# Patient Record
Sex: FEMALE | Race: WHITE | Employment: UNEMPLOYED | URBAN - METROPOLITAN AREA
[De-identification: names, ages, dates, MRNs, and addresses within clinical notes are randomized per-mention and may not be internally consistent; named-entity substitution may affect disease eponyms.]

---

## 2017-08-15 ENCOUNTER — TELEPHONE (OUTPATIENT)
Dept: FAMILY MEDICINE | Facility: CLINIC | Age: 57
End: 2017-08-15

## 2017-08-15 DIAGNOSIS — M25.512 LEFT SHOULDER PAIN, UNSPECIFIED CHRONICITY: Primary | ICD-10-CM

## 2017-08-15 NOTE — TELEPHONE ENCOUNTER
Reason for Call: Request for an order or referral:    Order or referral being requested: ORDER    Date needed: as soon as possible    Has the patient been seen by the PCP for this problem? 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 Pt has left shoulder pain and would like to see a Physical Therapist.    Phone number Patient can be reached at:  Home number on file [phone] (home)    Best Time:  Any Time    Can we leave a detailed message on this number?  YES    Call taken on 8/15/2017 at 12:02 PM by Laura Otto

## 2017-08-16 NOTE — TELEPHONE ENCOUNTER
Routing to provider - Shelia - please review and advise as appropriate  1. Referral request:  Physical Therapy  2. Reason:  Left shoulder pain due to work  3. Do you recommend any evaluation regarding workman's comp?  4. Last office visit 10/18/2016      Thank you,  Josselyn Castañeda RN

## 2017-08-16 NOTE — TELEPHONE ENCOUNTER
Return call to patient left referral information and home care recommendations - ice/heat, rest, tylenol/ibuprofen    To return call if symptoms worsen or physical therapy ineffective    Josselyn Castañeda RN

## 2017-08-22 ENCOUNTER — THERAPY VISIT (OUTPATIENT)
Dept: PHYSICAL THERAPY | Facility: CLINIC | Age: 57
End: 2017-08-22
Payer: COMMERCIAL

## 2017-08-22 DIAGNOSIS — M25.512 LEFT SHOULDER PAIN: Primary | ICD-10-CM

## 2017-08-22 PROCEDURE — 97110 THERAPEUTIC EXERCISES: CPT | Mod: GP | Performed by: PHYSICAL THERAPIST

## 2017-08-22 PROCEDURE — 97161 PT EVAL LOW COMPLEX 20 MIN: CPT | Mod: GP | Performed by: PHYSICAL THERAPIST

## 2017-08-22 NOTE — MR AVS SNAPSHOT
"              After Visit Summary   2017    Ruthann Beck    MRN: [id number]           Patient Information     Date Of Birth          1960        Visit Information        Provider Department      2017 4:50 PM Keyur Abarca PT [facility] [email or web address] [facility]        Today's Diagnoses     Left shoulder pain    -  1       Follow-ups after your visit        Who to contact     If you have questions or need follow up information about today's clinic visit or your schedule please contact [facility] directly at [phone].  Normal or non-critical lab and imaging results will be communicated to you by Cyprotexhart, letter or phone within 4 business days after the clinic has received the results. If you do not hear from us within 7 days, please contact the clinic through SocialTaggt or phone. If you have a critical or abnormal lab result, we will notify you by phone as soon as possible.  Submit refill requests through Hall or call your pharmacy and they will forward the refill request to us. Please allow 3 business days for your refill to be completed.          Additional Information About Your Visit        MyChart Information     Hall lets you send messages to your doctor, view your test results, renew your prescriptions, schedule appointments and more. To sign up, go to [email or web address] . Click on \"Log in\" on the left side of the screen, which will take you to the Welcome page. Then click on \"Sign up Now\" on the right side of the page.     You will be asked to enter the access code listed below, as well as some personal information. Please follow the directions to create your username and password.     Your access code is: [email or web address]  Expires: 2017  1:42 PM     Your access code will  in 90 days. If you need help or a new code, please call your [place] clinic or [phone].        Care EveryWhere ID     This is your Care EveryWhere " ID. This could be used by other organizations to access your [facility] medical records  [id number]        Your Vitals Were     Last Period                   04/05/2010            Blood Pressure from Last 3 Encounters:   10/18/16 120/70   09/06/16 90/58   08/17/16 98/60    Weight from Last 3 Encounters:   10/18/16 66.7 kg (147 lb)   09/06/16 67.1 kg (148 lb)   08/17/16 67.6 kg (149 lb)              We Performed the Following     HC PT EVAL, LOW COMPLEXITY     KIN INITIAL EVAL REPORT     THERAPEUTIC EXERCISES        Primary Care Provider Office Phone # Fax #    Deqa Amparo Bass [phone] [address] [facility] [address]        Equal Access to Services     AURE CABA : Hadii marizol álvarezo [place], waaxda luqadaha, qaybta kaalmada adeegyada, enzo espinoza . So [facility] [phone].    ATENCIÓN: Si habla español, tiene a aguilera disposición servicios gratuitos de asistencia lingüística. [facility] [phone].    We comply with applicable federal civil rights laws and [place] laws. We do not discriminate on the basis of race, color, national origin, age, disability sex, sexual orientation or gender identity.            Thank you!     Thank you for choosing [facility]  for your care. Our goal is always to provide you with excellent care. Hearing back from our patients is one way we can continue to improve our services. Please take a few minutes to complete the written survey that you may receive in the mail after your visit with us. Thank you!             Your Updated Medication List - Protect others around you: Learn how to safely use, store and throw away your medicines at www.disposemymeds.org.          This list is accurate as of: 8/22/17 11:59 PM.  Always use your most recent med list.                   Brand Name Dispense Instructions for use Diagnosis    estradiol 0.05 MG/24HR BIW patch    VIVELLE-DOT     Place 1 patch onto  the skin twice a week        PROMETRIUM PO      Take  by mouth daily. 1 tablet daily

## 2017-08-22 NOTE — PROGRESS NOTES
[facility] Initial Evaluation  Physical Therapy Initial Examination/Evaluation  August 22, 2017    Ruthann Beck is a 57 year old  female referred to physical therapy by Dr. Bass for treatment of left shoulder pain with Precautions/Restrictions/MD instructions eval and treat    Subjective:  Referring MD visit date: 8/17/17  DOI/onset: July 2017  Mechanism of injury: Repetitive overuse injury from work. 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  DOS N/A  Previous treatment: None  Imaging: None  Chief Complaint:   Left shoulder pain that increases with various activities including lifting/reaching over head, carrying and sleeping  Pain: rest 2 /10, activity 6/10 with overhead lifting Described as: ache sometimes sharp Alleviated by: rest, inactivity Frequency: intermittent Progression of symptoms since initial onset: better Time of day when pain is worse: day  Sleeping: Will wake on occasion, especially when sleeping on left side, with pain    Occupation: [employer]  Job duties:  Prolonged standing, lifting/carrying, pushing/pulling, repetitive tasks    Current HEP/exercise regimen: Progressive rotator cuff strengthening  Patient's goals are Decrease left shoulder pain, return to prior level of function without pain, perform work related activities without pain    Pertinent PMH: Menopausal   General Health Reported by Patient: excellent   Return to MD:  6 weeks if no improvement      SHOULDER EVALUATION  Static Posture:  Forward head: None Rounded shoulders: None Scapular winging: None    Dynamic scapular assessment: Normal scapular mechanics bilaterally in abduction plane  Flip Sign: Negative     Range of Motion:    AROM Flexion Abduction Flex/ER Ext/IR   Left 170 170 T2 T7   Right 180 180 T2 T6     PROM Flex Abd ER @ 90 IR @ 90   Left 180 180 80 90   Right 180 180 80 90     Strength:  MMT Flex Scaption Abd ER @ 0 IR @ 0 Mid trap Lower  trap   Left 4+ 4+ 4 4- 4+     Right 5 5 5 5 5       Special Tests  Left Right   Empty Can - -   Drop arm  - -   Yergason's  - -   Speed's - -   Patino-Mahamed - -   Neer - -   Lift-off - -   Apprehension - -   Hamm's + -   Sulcus Sign - -   AC cross body - -                                              Palpation:  Left: Mild tenderness along insertion of infraspinatus  Right: Unremarkable           HPI                    Objective:    System    Physical Exam    General     ROS    Assessment/Plan:      Patient is a 57 year old female with left side shoulder complaints.    Patient has the following significant findings with corresponding treatment plan.                Diagnosis 1:  Left shoulder pain  Pain -  manual therapy, splint/taping/bracing/orthotics, self management, education and home program  Decreased ROM/flexibility - manual therapy, therapeutic exercise, therapeutic activity and home program  Decreased strength - therapeutic exercise, therapeutic activities and home program  Decreased proprioception - neuro re-education, therapeutic activities and home program  Impaired muscle performance - neuro re-education and home program    Therapy Evaluation Codes:   1) History comprised of:   Personal factors that impact the plan of care:      None.    Comorbidity factors that impact the plan of care are:      Menopausal.     Medications impacting care: Hormone replacement.  2) Examination of Body Systems comprised of:   Body structures and functions that impact the plan of care:      Shoulder.   Activity limitations that impact the plan of care are:      Lifting and Reaching and Carrying.  3) Clinical presentation characteristics are:   Stable/Uncomplicated.  4) Decision-Making    Low complexity using standardized patient assessment instrument and/or measureable assessment of functional outcome.  Cumulative Therapy Evaluation is: Low complexity.    Previous and current functional limitations:  (See Goal Flow  Sheet for this information)    Short term and Long term goals: (See Goal Flow Sheet for this information)     Communication ability:  Patient appears to be able to clearly communicate and understand verbal and written communication and follow directions correctly.  Treatment Explanation - The following has been discussed with the patient:   RX ordered/plan of care  Anticipated outcomes  Possible risks and side effects  This patient would benefit from PT intervention to resume normal activities.   Rehab potential is good.    Frequency:  1 X week, once daily  Duration:  for 4 weeks  Discharge Plan:  Achieve all LTG.  Independent in home treatment program.  Reach maximal therapeutic benefit.    Please refer to the daily flowsheet for treatment today, total treatment time and time spent performing 1:1 timed codes.

## 2017-08-24 NOTE — PROGRESS NOTES
Subjective:    Patient is a 57 year old female presenting with rehab left ankle/foot hpi.                    and reported as 2/10.                General health as reported by patient is excellent.  Pertinent medical history includes:  Menopausal.    Surgical history: cryosurgery cervix, ankle.  Current medications:  Hormone replacement therapy.  Current occupation is [employer].    Primary job tasks include:  Prolonged standing, lifting and repetitive tasks.                                Objective:    System    Physical Exam    General     ROS    Assessment/Plan:

## 2017-09-20 ENCOUNTER — TELEPHONE (OUTPATIENT)
Dept: FAMILY MEDICINE | Facility: CLINIC | Age: 57
End: 2017-09-20

## 2017-11-15 ENCOUNTER — OFFICE VISIT (OUTPATIENT)
Dept: FAMILY MEDICINE | Facility: CLINIC | Age: 57
End: 2017-11-15
Payer: OTHER MISCELLANEOUS

## 2017-11-15 VITALS
SYSTOLIC BLOOD PRESSURE: 116 MMHG | BODY MASS INDEX: 25 KG/M2 | WEIGHT: 150.25 LBS | HEART RATE: 85 BPM | TEMPERATURE: 97.9 F | DIASTOLIC BLOOD PRESSURE: 74 MMHG | RESPIRATION RATE: 18 BRPM | OXYGEN SATURATION: 98 %

## 2017-11-15 DIAGNOSIS — S61.412A LACERATION OF LEFT HAND WITHOUT FOREIGN BODY, INITIAL ENCOUNTER: Primary | ICD-10-CM

## 2017-11-15 PROCEDURE — 99213 OFFICE O/P EST LOW 20 MIN: CPT | Performed by: FAMILY MEDICINE

## 2017-11-15 PROCEDURE — G0168 WOUND CLOSURE BY ADHESIVE: HCPCS | Performed by: FAMILY MEDICINE

## 2017-11-15 NOTE — PATIENT INSTRUCTIONS
General recommendations for sleep problems (Insomnia)  Allow 2-4 weeks to see results     Establish a regular sleep schedule   Go to bed at same time each night, get up at same time each day, avoid sleeping-in on weekends.  Cut down time in bed (if not asleep, get up, go in other room, do something calming and boring such as sorting papers, making warm milk, knitting, dusting)   Avoid trying to force yourself to sleep.  Use your bed only for sleep. Do not read or watch Television in bed.     Make the bedroom comfortable  Keepthe temperature in your bedroom comfortable, keep bedroom quiet when sleeping, and consider ear plugs (silicon) especially if you have partner who snores.  Keep bedroom dark enough:  use dark blinds or wear an eye mask if needed.    Relax at bedtime.    Do not watch tv or play video games or surf on computer right before bed; the full spectrum light actually stimulates your brain!  Relax each muscle group individually ; begin with your feet, work toward your head. Deal with your worries before bedtime by setting aside a worry time for 30 minutes earlier or journal your thoughts.  Listen to relaxation tapes such as Classical Music or Nature sounds or imagine a tranquil scene (e.g. waterfall or beach)   Back Massage : Gentle 5-minute back rub prior to bedtime can help relax you.    Perform measures to make you tired at bedtime.   Get regular Exercise each day (at least 6 hours before bedtime)   Take medications only as directed   Eat a light bedtime snack or warm drink, such as milk or herbal tea (non-caffeinated)   No Napping during day     Stimulus Control   Do not lie awake for more than 30 minutes.   Get out of bed and perform a quiet activity, then return to bed when sleepy.  Repeat as many times per night as needed     Things to avoid   Do not Exercise just before bedtime   No overstimulating activities just before bed, such as competitive games or exciting Television programs  Avoid  caffeine (coffee, tea, soda), chocolate after lunchtime   Do not use alcohol to induce sleep (worsens Insomnia)   Do not take someone else's sleeping pills   Do not look at the clock when awakening   Do not turn on light when getting up to use bathroom     Read the book Say Good Night To Insomnia by Rob.    I recommend taking melatonin at night to help with insomnia. This is a natural sleep inducer that shouldn't leave you feeling sedated or too tired in the morning.  Many people use it to help prevent jet lag when traveling.      It is available over the counter in drug stores and grocery stores.  Please follow the instruction on the bottle.  Dosages vary from 0.3 mg to 3 mg, and even very low doses appear to be effective.  Take it about 30 minutes before you lie down at night.

## 2017-11-15 NOTE — NURSING NOTE
"Chief Complaint   Patient presents with     Work Comp     cut with knife       Initial /74 (BP Location: Right arm, Patient Position: Sitting, Cuff Size: Adult Large)  Pulse 85  Temp 97.9  F (36.6  C) (Oral)  Resp 18  Wt 150 lb 4 oz (68.2 kg)  LMP 04/05/2010  SpO2 98%  BMI 25 kg/m2 Estimated body mass index is 25 kg/(m^2) as calculated from the following:    Height as of 7/13/16: 5' 5\" (1.651 m).    Weight as of this encounter: 150 lb 4 oz (68.2 kg).  Medication Reconciliation: complete     Richa Cruz, DOUGLAS        "

## 2017-11-15 NOTE — MR AVS SNAPSHOT
After Visit Summary   11/15/2017    Ruthann Beck    MRN: [id number]           Patient Information     Date Of Birth          1960        Visit Information        Provider Department      11/15/2017 3:00 PM Danya Almonte MD [facility]        Today's Diagnoses     Laceration of left hand without foreign body, initial encounter    -  1      Care Instructions    General recommendations for sleep problems (Insomnia)  Allow 2-4 weeks to see results     Establish a regular sleep schedule   Go to bed at same time each night, get up at same time each day, avoid sleeping-in on weekends.  Cut down time in bed (if not asleep, get up, go in other room, do something calming and boring such as sorting papers, making warm milk, knitting, dusting)   Avoid trying to force yourself to sleep.  Use your bed only for sleep. Do not read or watch Television in bed.     Make the bedroom comfortable  Keepthe temperature in your bedroom comfortable, keep bedroom quiet when sleeping, and consider ear plugs (silicon) especially if you have partner who snores.  Keep bedroom dark enough:  use dark blinds or wear an eye mask if needed.    Relax at bedtime.    Do not watch tv or play video games or surf on computer right before bed; the full spectrum light actually stimulates your brain!  Relax each muscle group individually ; begin with your feet, work toward your head. Deal with your worries before bedtime by setting aside a worry time for 30 minutes earlier or journal your thoughts.  Listen to relaxation tapes such as Classical Music or Nature sounds or imagine a tranquil scene (e.g. waterfall or beach)   Back Massage : Gentle 5-minute back rub prior to bedtime can help relax you.    Perform measures to make you tired at bedtime.   Get regular Exercise each day (at least 6 hours before bedtime)   Take medications only as directed   Eat a light bedtime snack or warm drink, such as milk or herbal  tea (non-caffeinated)   No Napping during day     Stimulus Control   Do not lie awake for more than 30 minutes.   Get out of bed and perform a quiet activity, then return to bed when sleepy.  Repeat as many times per night as needed     Things to avoid   Do not Exercise just before bedtime   No overstimulating activities just before bed, such as competitive games or exciting Television programs  Avoid caffeine (coffee, tea, soda), chocolate after lunchtime   Do not use alcohol to induce sleep (worsens Insomnia)   Do not take someone else's sleeping pills   Do not look at the clock when awakening   Do not turn on light when getting up to use bathroom     Read the book Say Good Night To Insomnia by Rob.    I recommend taking melatonin at night to help with insomnia. This is a natural sleep inducer that shouldn't leave you feeling sedated or too tired in the morning.  Many people use it to help prevent jet lag when traveling.      It is available over the counter in drug stores and grocery stores.  Please follow the instruction on the bottle.  Dosages vary from 0.3 mg to 3 mg, and even very low doses appear to be effective.  Take it about 30 minutes before you lie down at night.             Follow-ups after your visit        Who to contact     If you have questions or need follow up information about today's clinic visit or your schedule please contact [facility] directly at [phone].  Normal or non-critical lab and imaging results will be communicated to you by MyChart, letter or phone within 4 business days after the clinic has received the results. If you do not hear from us within 7 days, please contact the clinic through MyChart or phone. If you have a critical or abnormal lab result, we will notify you by phone as soon as possible.  Submit refill requests through [employer] or call your pharmacy and they will forward the refill request to us. Please allow 3 business days for your  "refill to be completed.          Additional Information About Your Visit        QuinturaharBucky Box Information     [employer] lets you send messages to your doctor, view your test results, renew your prescriptions, schedule appointments and more. To sign up, go to [email or web address] . Click on \"Log in\" on the left side of the screen, which will take you to the Welcome page. Then click on \"Sign up Now\" on the right side of the page.     You will be asked to enter the access code listed below, as well as some personal information. Please follow the directions to create your username and password.     Your access code is: [email or web address]  Expires: 2017 12:42 PM     Your access code will  in 90 days. If you need help or a new code, please call your [place] clinic or [phone].        Care EveryWhere ID     This is your [facility] EveryWhere ID. This could be used by other organizations to access your [facility] medical records  [id number]        Your Vitals Were     Pulse Temperature Respirations Last Period Pulse Oximetry BMI (Body Mass Index)    85 97.9  F (36.6  C) (Oral) 18 2010 98% 25 kg/m2       Blood Pressure from Last 3 Encounters:   11/15/17 116/74   10/18/16 120/70   16 90/58    Weight from Last 3 Encounters:   11/15/17 150 lb 4 oz (68.2 kg)   10/18/16 147 lb (66.7 kg)   16 148 lb (67.1 kg)              Today, you had the following     No orders found for display       Primary Care Provider Office Phone # Fax #    Deqa Amparo Bass [phone] [address]  [facility] [address]        Equal Access to Services     AUNG CABA : Jaren Flores, justus muñoz, lena finn, enzo garcia. So [facility] [phone].    ATENCIÓN: Si habla español, tiene a aguilera disposición servicios gratuitos de asistencia lingüística. Llame al [phone].    We comply with applicable federal civil rights laws and [place] laws. We do " not discriminate on the basis of race, color, national origin, age, disability, sex, sexual orientation, or gender identity.            Thank you!     Thank you for choosing [facility]  for your care. Our goal is always to provide you with excellent care. Hearing back from our patients is one way we can continue to improve our services. Please take a few minutes to complete the written survey that you may receive in the mail after your visit with us. Thank you!             Your Updated Medication List - Protect others around you: Learn how to safely use, store and throw away your medicines at www.disposemymeds.org.          This list is accurate as of: 11/15/17  4:02 PM.  Always use your most recent med list.                   Brand Name Dispense Instructions for use Diagnosis    estradiol 0.05 MG/24HR BIW patch    VIVELLE-DOT     Place 1 patch onto the skin twice a week        PROMETRIUM PO      Take  by mouth daily. 1 tablet daily

## 2017-11-15 NOTE — PROGRESS NOTES
"  SUBJECTIVE:   Ruthann Beck is a 57 year old female who presents to clinic today for the following health issues:      Work comp injury - patient cut herself with a knife, left hand, thumb at 7:30 am this morning.  Works as a  and was using a knife to get cold ganache out of a pan.   Slipped and cut left thenar eminence.  She is RHD.  States the wound \"bled a lot\"  She cleaned it and covered it and it stopped bleeding.  Wondering about status of tetanus.        Problem list and histories reviewed & adjusted, as indicated.  Additional history: as documented    Immunization History   Administered Date(s) Administered     Influenza (IIV3) 11/08/2005, 10/24/2011     Influenza Vaccine IM 3yrs+ 4 Valent IIV4 11/05/2013, 10/30/2015, 09/06/2016     TDAP Vaccine (Adacel) 02/01/2008, 02/07/2013       BP Readings from Last 3 Encounters:   11/15/17 116/74   10/18/16 120/70   09/06/16 90/58    Wt Readings from Last 3 Encounters:   11/15/17 150 lb 4 oz (68.2 kg)   10/18/16 147 lb (66.7 kg)   09/06/16 148 lb (67.1 kg)             Reviewed and updated as needed this visit by clinical staff  Tobacco  Allergies  Meds  Med Hx  Surg Hx  Fam Hx  Soc Hx            OBJECTIVE:     /74 (BP Location: Right arm, Patient Position: Sitting, Cuff Size: Adult Large)  Pulse 85  Temp 97.9  F (36.6  C) (Oral)  Resp 18  Wt 150 lb 4 oz (68.2 kg)  LMP 04/05/2010  SpO2 98%  BMI 25 kg/m2  Body mass index is 25 kg/(m^2).  GEN:  no apparent distress   Wound Findings:  Body area: left thenar eminence  Laceration length: 1cm  Contamination: The wound is not contaminated.  Foreign bodies:none  Tendon involvement: none   Not bleeding at all at this point    PROCEDURE:  Discussed laceration repair and obtained verbal consent.  The wound was cleansed with hibiclens and was closed with Dermabond. The patient tolerated the procedure well.  Wound cares were discussed including avoidance of petroleum jelly and other ointments  " Discussed signs of infection.      ASSESSMENT/PLAN:       1. Laceration of left hand without foreign body, initial encounter  I recommended dermabond given her profession and the relatively short, superficial nature of the wound and the location on a part of the thenar eminence that should not be under a lot of tension.  Discussed wound cares.    - WOUND CLOSURE BY ADHESIVE       Danya Almonte MD  [facility]

## 2018-01-09 ENCOUNTER — OFFICE VISIT (OUTPATIENT)
Dept: FAMILY MEDICINE | Facility: CLINIC | Age: 58
End: 2018-01-09
Payer: COMMERCIAL

## 2018-01-09 VITALS
BODY MASS INDEX: 25.79 KG/M2 | WEIGHT: 155 LBS | SYSTOLIC BLOOD PRESSURE: 126 MMHG | HEART RATE: 76 BPM | TEMPERATURE: 97.8 F | OXYGEN SATURATION: 99 % | RESPIRATION RATE: 16 BRPM | DIASTOLIC BLOOD PRESSURE: 78 MMHG

## 2018-01-09 DIAGNOSIS — D23.5 BENIGN NEOPLASM OF SKIN OF TRUNK, EXCEPT SCROTUM: Primary | ICD-10-CM

## 2018-01-09 PROCEDURE — 99213 OFFICE O/P EST LOW 20 MIN: CPT | Performed by: FAMILY MEDICINE

## 2018-01-09 NOTE — PROGRESS NOTES
SUBJECTIVE:       Pt has had skin lesion on the face for at least one year. It hurt when it was white and had a layer of white. She applied hand cream which improved. No change in size. Pt doesn't normally wear sun screen. No history of skin cancer. No family history of skin cancer.     Problem list and histories reviewed & adjusted, as indicated.  Additional history: as documented    Reviewed and updated as needed this visit by clinical staffTobacco  Allergies  Meds  Med Hx  Surg Hx  Fam Hx  Soc Hx      Reviewed and updated as needed this visit by Provider         ROS:  Constitutional, HEENT, cardiovascular, pulmonary, gi and gu systems are negative, except as otherwise noted.      OBJECTIVE:   /78  Pulse 76  Temp 97.8  F (36.6  C) (Tympanic)  Resp 16  Wt 155 lb (70.3 kg)  LMP 04/05/2010  SpO2 99%  BMI 25.79 kg/m2  Body mass index is 25.79 kg/(m^2).  GENERAL: healthy, alert and no distress  EYES: Eyes grossly normal to inspection  HENT:  nose and mouth without ulcers or lesions  SKIN: skin colored papule on left infraorbital region     Diagnostic Test Results:  none     ASSESSMENT/PLAN:     1. Benign neoplasm of skin of trunk, except scrotum  - DERMATOLOGY REFERRAL for further evaluation   - advised to wear sun screen         Glory Bass MD  [facility]

## 2018-01-09 NOTE — MR AVS SNAPSHOT
After Visit Summary   1/9/2018    Ruthann Beck    MRN: [id number]           Patient Information     Date Of Birth          1960        Visit Information        Provider Department      1/9/2018 10:00 AM Glory Bass MD [facility]        Today's Diagnoses     Benign neoplasm of skin of trunk, except scrotum    -  1       Follow-ups after your visit        Additional Services     DERMATOLOGY REFERRAL       Your provider has referred you to: [facility]: Dermatology Clinic [facility] [phone]   [email or web address]    Please be aware that coverage of these services is subject to the terms and limitations of your health insurance plan.  Call member services at your health plan with any benefit or coverage questions.      Please bring the following with you to your appointment:    (1) Any X-Rays, CTs or MRIs which have been performed.  Contact the facility where they were done to arrange for  prior to your scheduled appointment.    (2) List of current medications  (3) This referral request   (4) Any documents/labs given to you for this referral                  Who to contact     If you have questions or need follow up information about today's clinic visit or your schedule please contact [facility] directly at [phone].  Normal or non-critical lab and imaging results will be communicated to you by MyChart, letter or phone within 4 business days after the clinic has received the results. If you do not hear from us within 7 days, please contact the clinic through MyChart or phone. If you have a critical or abnormal lab result, we will notify you by phone as soon as possible.  Submit refill requests through [employer] or call your pharmacy and they will forward the refill request to us. Please allow 3 business days for your refill to be completed.          Additional Information About Your Visit        MyChart  "Information     SiOx lets you send messages to your doctor, view your test results, renew your prescriptions, schedule appointments and more. To sign up, go to [email or web address] . Click on \"Log in\" on the left side of the screen, which will take you to the Welcome page. Then click on \"Sign up Now\" on the right side of the page.     You will be asked to enter the access code listed below, as well as some personal information. Please follow the directions to create your username and password.     Your access code is: [email or web address]  Expires: 2018 10:24 AM     Your access code will  in 90 days. If you need help or a new code, please call your [place] clinic or [phone].        Care EveryWhere ID     This is your [facility] EveryWhere ID. This could be used by other organizations to access your [place] medical records  [id number]        Your Vitals Were     Pulse Temperature Respirations Last Period Pulse Oximetry BMI (Body Mass Index)    76 97.8  F (36.6  C) (Tympanic) 16 2010 99% 25.79 kg/m2       Blood Pressure from Last 3 Encounters:   18 126/78   11/15/17 116/74   10/18/16 120/70    Weight from Last 3 Encounters:   18 155 lb (70.3 kg)   11/15/17 150 lb 4 oz (68.2 kg)   10/18/16 147 lb (66.7 kg)              We Performed the Following     DERMATOLOGY REFERRAL        Primary Care Provider Office Phone # Fax #    Deqa Amparo Bass [phone]       [address]  [facility] [address]        Equal Access to Services     [facility]: Hadii marizol Flores, justus muñoz, enzo landeros . So [facility] [phone].    ATENCIÓN: Si habla español, tiene a aguilera disposición servicios gratuitos de asistencia lingüística. Llame al [phone].    We comply with applicable federal civil rights laws and [place] laws. We do not discriminate on the basis of race, color, national origin, age, disability, sex, " sexual orientation, or gender identity.            Thank you!     Thank you for choosing [facility]  for your care. Our goal is always to provide you with excellent care. Hearing back from our patients is one way we can continue to improve our services. Please take a few minutes to complete the written survey that you may receive in the mail after your visit with us. Thank you!             Your Updated Medication List - Protect others around you: Learn how to safely use, store and throw away your medicines at www.disposemymeds.org.          This list is accurate as of: 1/9/18 10:24 AM.  Always use your most recent med list.                   Brand Name Dispense Instructions for use Diagnosis    estradiol 0.05 MG/24HR BIW patch    VIVELLE-DOT     Place 1 patch onto the skin twice a week        PROMETRIUM PO      Take  by mouth daily. 1 tablet daily

## 2018-01-19 ENCOUNTER — TELEPHONE (OUTPATIENT)
Dept: FAMILY MEDICINE | Facility: CLINIC | Age: 58
End: 2018-01-19

## 2018-01-19 NOTE — TELEPHONE ENCOUNTER
Reason for Call:  Other call back    Detailed comments: Pt would like Dr Bass to call [facility]: [facility] [place] [phone] to see if pt can get appointment sooner than the end of April 2018. Pt was diagnosed with Actinic Keratosis.     Phone Number Patient can be reached at: Home number on file [phone] (home)    Best Time: Any Time    Can we leave a detailed message on this number? YES    Call taken on 1/19/2018 at 11:24 AM by Laura Otto

## 2018-01-22 NOTE — TELEPHONE ENCOUNTER
Your provider has referred you to: [facility]: [facility] Dermatology [facility] [phone]   [email or web address]  [facility]: [facility] Dermatology [facility] [phone]  [facility]: [facility] [phone]  [email or web address]  [facility]: [facility] [phone]   [email or web address]  [facility]: [facility] [phone]   [email or web address]        Above are other derm referrals. Does pt only want to go to [facility] derm?  DM

## 2018-01-22 NOTE — TELEPHONE ENCOUNTER
,  --She would like to know how urgent this skin problem is.  --She did take the alternative phone numbers for other derm clinics.    Kimberly REMY

## 2018-01-23 NOTE — TELEPHONE ENCOUNTER
PCP huddled with me.  She would want pt seen in the next few weeks.  [place] is known to get pt's seen within 4 weeks. That might be a good option.      LM for pt to call and speak with triage.  SANDRITA Eagle

## 2018-01-25 NOTE — TELEPHONE ENCOUNTER
Detailed message left for patient relaying recommendation per Dr. Bass and reviewing additional dermatology clinic options.    TOSHA OliveiraN, RN

## 2018-05-26 ENCOUNTER — HEALTH MAINTENANCE LETTER (OUTPATIENT)
Age: 58
End: 2018-05-26

## 2018-06-23 LAB
HPV ABSTRACT: NORMAL
PAP-ABSTRACT: NORMAL

## 2018-07-14 ENCOUNTER — TRANSFERRED RECORDS (OUTPATIENT)
Dept: HEALTH INFORMATION MANAGEMENT | Facility: CLINIC | Age: 58
End: 2018-07-14

## 2018-07-14 LAB
CHOLEST SERPL-MCNC: 228 MG/DL (ref 0–199)
HDLC SERPL-MCNC: 80 MG/DL
LDLC SERPL CALC-MCNC: 135 MG/DL (ref 19–130)
NONHDLC SERPL-MCNC: 148 MG/DL (ref 0–159)
TRIGL SERPL-MCNC: 63 MG/DL (ref 4–149)

## 2018-07-23 ENCOUNTER — OFFICE VISIT (OUTPATIENT)
Dept: FAMILY MEDICINE | Facility: CLINIC | Age: 58
End: 2018-07-23
Payer: COMMERCIAL

## 2018-07-23 VITALS
RESPIRATION RATE: 12 BRPM | SYSTOLIC BLOOD PRESSURE: 114 MMHG | DIASTOLIC BLOOD PRESSURE: 62 MMHG | BODY MASS INDEX: 25.21 KG/M2 | TEMPERATURE: 98.3 F | OXYGEN SATURATION: 98 % | WEIGHT: 151.5 LBS | HEART RATE: 85 BPM

## 2018-07-23 DIAGNOSIS — M72.2 PLANTAR FASCIITIS, RIGHT: Primary | ICD-10-CM

## 2018-07-23 PROCEDURE — 99213 OFFICE O/P EST LOW 20 MIN: CPT | Performed by: FAMILY MEDICINE

## 2018-07-23 NOTE — MR AVS SNAPSHOT
After Visit Summary   7/23/2018    Ruthann Beck    MRN: [id number]           Patient Information     Date Of Birth          1960        Visit Information        Provider Department      7/23/2018 4:20 PM Glory Bass MD [facility]        Today's Diagnoses     Plantar fasciitis, right    -  1      Care Instructions    Ibuprofen 200 to 400 mg every 8 hours as needed for pain, please take with food   Boot during the day, you can take off at night   Continue stretching exercises and insoles  If you continue to have pain then please follow up with Dr. Nath          Follow-ups after your visit        Additional Services     ORTHOTICS REFERRAL       **This referral order prints off in the [facility]  (Orthotics & Prosthetics) Central Scheduling Office**    The [facility] Central Scheduling Staff will contact the patient to schedule appointments.     Central Scheduling Contact Information: [phone] ([place])    Orthotics: ==This Referral PRINTS in the [facility] (ORTHOTICS & PROSTHETICS) Central scheduling office ==     Coverage of these services is subject to the terms and limitations of your health insurance plan.  Please call  member services at your health plan with any benefit or coverage questions.  ### The  [facility] Central Scheduling staff will contact patient to arrange appointments.###   Central Scheduling Phone #: [phone]     [facility] referral to [facility] at [phone]..  Any CT, MRI or procedures ordered by your specialist must be performed at a [facility] facility OR coordinated by your clinic referral office.    Treatment Requested:  Orthotics:Foot Orthotics - please consider a more accommodative device, given her good arch height           Please be aware that coverage of these services is subject to the terms and limitations of your health insurance  plan.  Call member services at your health plan with any benefit or coverage questions.      Please bring the following to your appointment:    >>   Any x-rays, CTs or MRIs which have been performed.  Contact the facility where they were done to arrange for  prior to your scheduled appointment.    >>   List of current medications   >>   This referral request   >>   Any documents/labs given to you for this referral            PODIATRY/FOOT & ANKLE SURGERY REFERRAL       Your provider has referred you to: [facility]: [facility] [phone]   [email or web address]    Please be aware that coverage of these services is subject to the terms and limitations of your health insurance plan.  Call member services at your health plan with any benefit or coverage questions.      Please bring the following to your appointment:  >>   Any x-rays, CTs or MRIs which have been performed.  Contact the facility where they were done to arrange for  prior to your scheduled appointment.    >>   List of current medications   >>   This referral request   >>   Any documents/labs given to you for this referral                  Who to contact     If you have questions or need follow up information about today's clinic visit or your schedule please contact [facility] directly at [phone].  Normal or non-critical lab and imaging results will be communicated to you by MyChart, letter or phone within 4 business days after the clinic has received the results. If you do not hear from us within 7 days, please contact the clinic through MyChart or phone. If you have a critical or abnormal lab result, we will notify you by phone as soon as possible.  Submit refill requests through [employer] or call your pharmacy and they will forward the refill request to us. Please allow 3 business days for your refill to be completed.          Additional Information About Your Visit         ROVOP Information     [employer] gives you secure access to your electronic health record. If you see a primary care provider, you can also send messages to your care team and make appointments. If you have questions, please call your primary care clinic.  If you do not have a primary care provider, please call [phone] and they will assist you.        Care EveryWhere ID     This is your Care EveryWhere ID. This could be used by other organizations to access your [place] medical records  [id number]        Your Vitals Were     Pulse Temperature Respirations Last Period Pulse Oximetry BMI (Body Mass Index)    85 98.3  F (36.8  C) (Oral) 12 04/05/2010 98% 25.21 kg/m2       Blood Pressure from Last 3 Encounters:   07/23/18 114/62   01/09/18 126/78   11/15/17 116/74    Weight from Last 3 Encounters:   07/23/18 151 lb 8 oz (68.7 kg)   01/09/18 155 lb (70.3 kg)   11/15/17 150 lb 4 oz (68.2 kg)              We Performed the Following     ORTHOTICS REFERRAL     PODIATRY/FOOT & ANKLE SURGERY REFERRAL          Today's Medication Changes          These changes are accurate as of 7/23/18  4:41 PM.  If you have any questions, ask your nurse or doctor.               Start taking these medicines.        Dose/Directions    order for DME   Used for:  Plantar fasciitis, right   Started by:  Glory Bass MD        Equipment being ordered: right CAM boot   Quantity:  1 Device   Refills:  0            Where to get your medicines      Some of these will need a paper prescription and others can be bought over the counter.  Ask your nurse if you have questions.     Bring a paper prescription for each of these medications     order for DME                Primary Care Provider Office Phone # Fax #    Glory Bass [phone] [address]  [facility] [address]        Equal Access to Services     AURE CABA : justus Tiwari qaybta kaalmada adeegyada, waxay  luis daniel patelstevegiuliano reidJenniferaalisbeth ah. So [facility] [phone].    ATENCIÓN: Si habla jeannie, tiene a aguilera disposición servicios gratuitos de asistencia lingüística. Luis al [phone].    We comply with applicable federal civil rights laws and [place] laws. We do not discriminate on the basis of race, color, national origin, age, disability, sex, sexual orientation, or gender identity.            Thank you!     Thank you for choosing [facility]  for your care. Our goal is always to provide you with excellent care. Hearing back from our patients is one way we can continue to improve our services. Please take a few minutes to complete the written survey that you may receive in the mail after your visit with us. Thank you!             Your Updated Medication List - Protect others around you: Learn how to safely use, store and throw away your medicines at www.disposemymeds.org.          This list is accurate as of 7/23/18  4:41 PM.  Always use your most recent med list.                   Brand Name Dispense Instructions for use Diagnosis    estradiol 0.05 MG/24HR BIW patch    VIVELLE-DOT     Place 1 patch onto the skin twice a week        order for DME     1 Device    Equipment being ordered: right CAM boot    Plantar fasciitis, right       PROMETRIUM PO      Take  by mouth daily. 1 tablet daily

## 2018-07-23 NOTE — PATIENT INSTRUCTIONS
Ibuprofen 200 to 400 mg every 8 hours as needed for pain, please take with food   Boot during the day, you can take off at night   Continue stretching exercises and insoles  If you continue to have pain then please follow up with Dr. Nath

## 2018-07-23 NOTE — PROGRESS NOTES
SUBJECTIVE:   Ruthann Beck is a 58 year old female who presents to clinic today for the following health issues:      Foot problem/pain      Duration: six months     Symptoms slightly get better and then worse     She is on her feet for 8-10 hours     Description  Location: right foot     Intensity:  Mild to severe     Accompanying signs and symptoms: difficulty walking     History  Previous similar problem: yes, plantar fascitis and it improved with the boot     Precipitating or alleviating factors:  Trauma or overuse: YES, walking   Aggravating factors include: walking    Therapies tried and outcome: ice and ibuprofen   Pt wears insoles in her work shoes.         Problem list and histories reviewed & adjusted, as indicated.  Additional history: as documented    Labs reviewed in EPIC    Reviewed and updated as needed this visit by clinical staff       Reviewed and updated as needed this visit by Provider         ROS:  Constitutional, HEENT, cardiovascular, pulmonary, gi and gu systems are negative, except as otherwise noted.    OBJECTIVE:     /62 (BP Location: Left arm, Patient Position: Chair, Cuff Size: Adult Regular)  Pulse 85  Temp 98.3  F (36.8  C) (Oral)  Resp 12  Wt 151 lb 8 oz (68.7 kg)  LMP 04/05/2010  SpO2 98%  BMI 25.21 kg/m2  Body mass index is 25.21 kg/(m^2).  GENERAL: healthy, alert and no distress  EYES: Eyes grossly normal to inspection  HENT: nose and mouth without ulcers or lesions  MS: no gross musculoskeletal defects noted, no edema, + right plantar fasciitis tenderness   SKIN: no suspicious lesions or rashes    Diagnostic Test Results:  none     ASSESSMENT/PLAN:     1. Plantar fasciitis, right  - ORTHOTICS REFERRAL  - order for DME; Equipment being ordered: right CAM boot  Dispense: 1 Device; Refill: 0  - PODIATRY/FOOT & ANKLE SURGERY REFERRAL  Ibuprofen 200 to 400 mg every 8 hours as needed for pain, please take with food   Boot during the day, you can take off at  night   Continue stretching exercises and insoles  If you continue to have pain then please follow up with Dr. Portillo Bass MD  [facility]

## 2018-07-23 NOTE — Clinical Note
Please abstract the following data from this visit with this patient into the appropriate field in Epic:  Mammogram done on this date: 07/14/18 (approximately), by this group: [facility], results were normal.  Pap smear done on this date: 06/23/18 (approximately), by this group: [facility], results were normal.

## 2018-11-13 ENCOUNTER — OFFICE VISIT (OUTPATIENT)
Dept: FAMILY MEDICINE | Facility: CLINIC | Age: 58
End: 2018-11-13
Payer: COMMERCIAL

## 2018-11-13 VITALS
HEIGHT: 65 IN | HEART RATE: 78 BPM | DIASTOLIC BLOOD PRESSURE: 70 MMHG | OXYGEN SATURATION: 97 % | TEMPERATURE: 98.1 F | WEIGHT: 147 LBS | SYSTOLIC BLOOD PRESSURE: 108 MMHG | BODY MASS INDEX: 24.49 KG/M2

## 2018-11-13 DIAGNOSIS — R05.9 COUGH: Primary | ICD-10-CM

## 2018-11-13 PROCEDURE — 99213 OFFICE O/P EST LOW 20 MIN: CPT | Performed by: FAMILY MEDICINE

## 2018-11-13 NOTE — PATIENT INSTRUCTIONS
Albuterol inhaler, 2 puffs, 10 minutes before exercise or going out in the cold or every 4-6 hours as needed for wheezing or shortness of breath  Flonase 2 nasal sprays once daily   If your symptoms do not improve over the next seven days then you would benefit from a chest xray. You can call or my chart and then I'll place the order.

## 2018-11-13 NOTE — PROGRESS NOTES
"  SUBJECTIVE:   Ruthann Beck is a 58 year old female who presents to clinic today for the following health issues:    Acute Illness    Acute illness concerns: cough, SOB   Onset: 10/21/2018     Fever: no    Chills/Sweats: no    Headache (location?): no    Sinus Pressure:YES    Conjunctivitis:  no    Ear Pain: no    Rhinorrhea: YES, mild     Congestion: YES    Sore Throat: no   No PND   Cough: YES    Wheeze: no     Decreased Appetite: no    Nausea: no    Vomiting: no    Diarrhea:  no    Dysuria/Freq.: no    Fatigue/Achiness: no    Sick/Strep Exposure: no     Therapies Tried and outcome: resting     Sometimes she feels that she can't catch her breath.   No recent travel.        Problem list and histories reviewed & adjusted, as indicated.  Additional history: as documented    Labs reviewed in EPIC    Reviewed and updated as needed this visit by clinical staff       Reviewed and updated as needed this visit by Provider         ROS:  Constitutional, HEENT, cardiovascular, pulmonary, gi and gu systems are negative, except as otherwise noted.    OBJECTIVE:     /70  Pulse 78  Temp 98.1  F (36.7  C) (Oral)  Ht 5' 5\" (1.651 m)  Wt 147 lb (66.7 kg)  LMP 04/05/2010  SpO2 97%  BMI 24.46 kg/m2  Body mass index is 24.46 kg/(m^2).  GENERAL: healthy, alert and no distress  EYES: Eyes grossly normal to inspection  HENT: nose and mouth without ulcers or lesions  NECK: no adenopathy  RESP: lungs clear to auscultation - no rales, rhonchi or wheezes  CV: regular rate and rhythm, normal S1 S2    Diagnostic Test Results:  none     ASSESSMENT/PLAN:     1. Cough  - advised below   Albuterol inhaler, 2 puffs, 10 minutes before exercise or going out in the cold or every 4-6 hours as needed for wheezing or shortness of breath  Flonase 2 nasal sprays once daily   If your symptoms do not improve over the next seven days then you would benefit from a chest xray. You can call or my chart and then I'll place the order.   - " albuterol (PROAIR HFA/PROVENTIL HFA/VENTOLIN HFA) 108 (90 Base) MCG/ACT inhaler; Inhale 2 puffs into the lungs every 6 hours as needed for shortness of breath / dyspnea or wheezing  Dispense: 1 Inhaler; Refill: 0          Deqa Amparo Bass MD  [facility]

## 2018-11-13 NOTE — MR AVS SNAPSHOT
After Visit Summary   11/13/2018    Ruthann Beck    MRN: [id number]           Patient Information     Date Of Birth          1960        Visit Information        Provider Department      11/13/2018 4:00 PM Glory Bass MD [facility]        Care Instructions      Albuterol inhaler, 2 puffs, 10 minutes before exercise or going out in the cold or every 4-6 hours as needed for wheezing or shortness of breath  Flonase 2 nasal sprays once daily   If your symptoms do not improve over the next seven days then you would benefit from a chest xray. You can call or my chart and then I'll place the order.           Follow-ups after your visit        Follow-up notes from your care team     Return in about 7 days (around 11/20/2018) for sympotms are not improving.      Who to contact     If you have questions or need follow up information about today's clinic visit or your schedule please contact [facility] directly at [phone].  Normal or non-critical lab and imaging results will be communicated to you by Parallocityhart, letter or phone within 4 business days after the clinic has received the results. If you do not hear from us within 7 days, please contact the clinic through Noovot or phone. If you have a critical or abnormal lab result, we will notify you by phone as soon as possible.  Submit refill requests through Pi-Cardia or call your pharmacy and they will forward the refill request to us. Please allow 3 business days for your refill to be completed.          Additional Information About Your Visit        Parallocityhart Information     Pi-Cardia gives you secure access to your electronic health record. If you see a primary care provider, you can also send messages to your care team and make appointments. If you have questions, please call your primary care clinic.  If you do not have a primary care provider, please call [phone] and they will assist you.       "  Care EveryWhere ID     This is your Care EveryWhere ID. This could be used by other organizations to access your [place] medical records  [id number]        Your Vitals Were     Pulse Temperature Height Last Period Pulse Oximetry BMI (Body Mass Index)    78 98.1  F (36.7  C) (Oral) 5' 5\" (1.651 m) 04/05/2010 97% 24.46 kg/m2       Blood Pressure from Last 3 Encounters:   11/13/18 108/70   07/23/18 114/62   01/09/18 126/78    Weight from Last 3 Encounters:   11/13/18 147 lb (66.7 kg)   07/23/18 151 lb 8 oz (68.7 kg)   01/09/18 155 lb (70.3 kg)              Today, you had the following     No orders found for display       Primary Care Provider Office Phone # Fax #    Deqa Amparo Bass [phone]       [address]  [facility] [address]        Equal Access to Services     [facility] : Hadii aad ku hadasho Soomaali, waaxda luqadaha, qaybta kaalmada adeegyada, waxay luis daniel hayfredis espinoza . So [facility] [phone].    ATENCIÓN: Si habla español, tiene a aguilera disposición servicios gratuitos de asistencia lingüística. [facility] [phone].    We comply with applicable federal civil rights laws and [place] laws. We do not discriminate on the basis of race, color, national origin, age, disability, sex, sexual orientation, or gender identity.            Thank you!     Thank you for choosing [facility]  for your care. Our goal is always to provide you with excellent care. Hearing back from our patients is one way we can continue to improve our services. Please take a few minutes to complete the written survey that you may receive in the mail after your visit with us. Thank you!             Your Updated Medication List - Protect others around you: Learn how to safely use, store and throw away your medicines at www.disposemymeds.org.          This list is accurate as of 11/13/18  4:22 PM.  Always use your most recent med list.                   Brand Name Dispense " Instructions for use Diagnosis    estradiol 0.05 MG/24HR BIW patch    VIVELLE-DOT     Place 1 patch onto the skin twice a week        order for DME     1 Device    Equipment being ordered: right CAM boot    Plantar fasciitis, right       PROMETRIUM PO      Take  by mouth daily. 1 tablet daily

## 2018-11-14 RX ORDER — ALBUTEROL SULFATE 90 UG/1
2 AEROSOL, METERED RESPIRATORY (INHALATION) EVERY 6 HOURS PRN
Qty: 1 INHALER | Refills: 0 | Status: SHIPPED | OUTPATIENT
Start: 2018-11-14 | End: 2019-11-15

## 2019-08-16 ENCOUNTER — OFFICE VISIT (OUTPATIENT)
Dept: FAMILY MEDICINE | Facility: CLINIC | Age: 59
End: 2019-08-16
Payer: OTHER MISCELLANEOUS

## 2019-08-16 ENCOUNTER — ANCILLARY PROCEDURE (OUTPATIENT)
Dept: GENERAL RADIOLOGY | Facility: CLINIC | Age: 59
End: 2019-08-16
Attending: FAMILY MEDICINE
Payer: OTHER MISCELLANEOUS

## 2019-08-16 VITALS
SYSTOLIC BLOOD PRESSURE: 124 MMHG | BODY MASS INDEX: 25.13 KG/M2 | OXYGEN SATURATION: 100 % | HEART RATE: 77 BPM | DIASTOLIC BLOOD PRESSURE: 70 MMHG | WEIGHT: 151 LBS | TEMPERATURE: 98 F

## 2019-08-16 DIAGNOSIS — M54.16 RIGHT LUMBAR RADICULITIS: ICD-10-CM

## 2019-08-16 DIAGNOSIS — M54.16 RIGHT LUMBAR RADICULITIS: Primary | ICD-10-CM

## 2019-08-16 PROCEDURE — 72100 X-RAY EXAM L-S SPINE 2/3 VWS: CPT

## 2019-08-16 PROCEDURE — 99214 OFFICE O/P EST MOD 30 MIN: CPT | Performed by: FAMILY MEDICINE

## 2019-08-16 NOTE — PROGRESS NOTES
Subjective     Ruthann Beck is a 59 year old female who presents to clinic today for the following health issues:    HPI   Back Pain       Duration: 1 day         Specific cause: lifting    Description:   Location of pain: low back right  Character of pain: throbbing pain   Pain radiation: yes down legs   New numbness or weakness in legs, not attributed to pain:  no     Intensity: At its worst 10/10when moving     History:   Pain interferes with job: YES  History of back problems: no prior back problems  Any previous MRI or X-rays: None  Sees a specialist for back pain:  No  Therapies tried without relief:    Alleviating factors:   Improved by: NSAIDs      Precipitating factors:  Worsened by: Bending    workmans comp.     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  She notices her pain started that night (Wednesday night)  She took 400 mg ibuprofen with some benefit.  Her goal is to find out how her back is doing and would like to have an x-ray and also would like to hold work restrictions.  She is not sure if she can work in current situation.   She denies any fever, change in her bowel or bladder habit.  She has some urinary incontinence but no change.  No fecal incontinence.  No saddle anesthesia.  Yesterday she started noticing pain going towards her thigh.  Radiation of pain is not beyond her knee.    Social History     Occupational History     Occupation: baker     Employer: [employer]     Comment: baker [employer]   Tobacco Use     Smoking status: Never Smoker     Smokeless tobacco: Never Used   Substance and Sexual Activity     Alcohol use: Yes     Comment: 1 beer or glass of wine a day     Drug use: No     Sexual activity: Yes     Partners: Male     Allergies   Allergen Reactions     Bee Venom      swelling     No Known Drug Allergies      Patient Active Problem List   Diagnosis     Lumbar radiculopathy     CARDIOVASCULAR SCREENING; LDL  GOAL LESS THAN 160     Common wart     Hip pain     Left knee pain     Right knee pain     Left shoulder pain     Reviewed medications, social history and  past medical and surgical history.    Review of system: for general, respiratory, CVS, GI and psychiatry negative except for noted above.     EXAM:  /70   Pulse 77   Temp 98  F (36.7  C) (Oral)   Wt 68.5 kg (151 lb)   LMP 04/05/2010   SpO2 100%   BMI 25.13 kg/m    Constitutional: healthy, alert and no distress   Psychiatric: mentation appears normal and affect normal/bright  NEURO: Gait minimally antalgic.  Both lower extremity reflexes normal and symmetrical.  Joint -back examined.  No visible deformity.  Right SI joint area diffuse tenderness.  Straight leg test negative.  Unable to change her position very quickly.  SKIN: no suspicious lesions or rashes on affected area    ASSESSMENT / PLAN:  (M54.16) Right lumbar radiculitis  (primary encounter diagnosis)  Comment: back muscle stiffness present.  She wishes not to use any medication.  I recommended to use heat.  She currently has no red flag symptoms.  She wishes to have a work restrictions.  Work comp letter given.  If she is not seeing improvement in a week she should see sports medicine for further evaluation.  She has had a good luck with physical therapy in the past for her back pain and it would be reasonable to consider physical therapy again.  We discussed generally x-rays are not indicated for back pain as a first-line of diagnostic test but as per her request it would be reasonable to obtain an x-ray. We discussed red flag symptoms and when to seek follow up care.   Plan: XR Lumbar Spine 2/3 Views, ORTHO          REFERRAL, KIN PT, HAND, AND CHIROPRACTIC         REFERRAL       The above note was dictated using voice recognition. Although reviewed after completion, some word and grammatical error may remain .

## 2019-08-16 NOTE — LETTER
REPORT OF WORK COMP  [facility]  [address] [place] [address] [phone]  PATIENT DATA    Employee Name: Ruthann Beck      : 1960     SS#: [id number]    Work related injury: Yes  Employer at time of injury: [employer] [place]  Employer contact & phone:    Employed elsewhere? No  Workers' Compensation Carrier/Managed Care Plan:       Today's date: 2019  Date of injury: 19  Date of first visit: 19    PROVIDER EVALUATION: Please fill in as needed.  Please give copy to employee for employer.    1. Diagnosis: right lumbar radicular pain  2. Treatment: physical therapy.  3. Medication: none  NOTE: When ordering a medication, [place] Rules require Work Comp or WC on prescriptions.  4. No work from 19 to 19.  5. Return to work date: 19   ** WITH RESTRICTIONS? No restrictions    RESTRICTIONS: Unlimited unless listed.  Restrictions apply to home and leisure also.  If work restrictions is not available, the employee is totally disabled.    Maximum Medical Improvement (Date):  unknown  Any Permanent Partial Disability? Deferred to future exam/consult.    Provider comments: going to see sports medicine if not feeling better in a week.     Medical Examiner: Dionisio Spain MD          License or registration: [place] [id number]    Next appointment: As needed and in a week if not improving.     CC: Employer, Managed Care Plan/Payor, Patient

## 2019-08-20 ENCOUNTER — THERAPY VISIT (OUTPATIENT)
Dept: PHYSICAL THERAPY | Facility: CLINIC | Age: 59
End: 2019-08-20
Payer: OTHER MISCELLANEOUS

## 2019-08-20 DIAGNOSIS — M54.9 BACK PAIN: ICD-10-CM

## 2019-08-20 DIAGNOSIS — M54.50 BILATERAL LOW BACK PAIN WITHOUT SCIATICA: Primary | ICD-10-CM

## 2019-08-20 PROCEDURE — 97530 THERAPEUTIC ACTIVITIES: CPT | Mod: GP | Performed by: PHYSICAL THERAPIST

## 2019-08-20 PROCEDURE — 97161 PT EVAL LOW COMPLEX 20 MIN: CPT | Mod: GP | Performed by: PHYSICAL THERAPIST

## 2019-08-20 PROCEDURE — 97110 THERAPEUTIC EXERCISES: CPT | Mod: GP | Performed by: PHYSICAL THERAPIST

## 2019-08-20 NOTE — PROGRESS NOTES
[facility] Initial Evaluation  Subjective:  Pt presents to PT with c/o R sided LBP with onset after lifting 50lb bags of flour at work on 8-15-19.   She reports she has to move 40 of these bags every 2 weeks.        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.  She is currently off work and she is very anxious about reinjuring her back.      PMH:  X-ray mild OA of spine      Type of problem:  Lumbar   Condition occurred with:  Repetition/overuse. This is a new condition    Patient reports pain:  Central lumbar spine and lumbar spine right.  Associated symptoms:  Loss of motion, loss of motion/stiffness and loss of strength. Symptoms are exacerbated by bending and lifting and relieved by rest.      and reported as 4/10 on pain scale. General health as reported by patient is good.            Pain is described as aching and sharp   Since onset symptoms are unchanged. Special tests:  X-ray.  There was none improvement following previous treatment.                              Objective:  System         Lumbar/SI Evaluation  ROM:    AROM Lumbar:   Flexion:            75%, +  Ext:                    75%, ++   Side Bend:        Left:  75%    Right:  75%  Rotation:           Left:  75%    Right:  75%  Side Glide:        Left:     Right:           Lumbar Myotomes:  normal            Lumbar DTR's:  normal        Lumbar Dermtomes:  normal                Neural Tension/Mobility:  Lumbar:  Normal          Functional Tests:  Core strength and proprioception lumbar: Squat decreased depth and painful.  SLS painful on RLE and increased unsteadiness, Lifting poor mechanics.                                                             General Evaluation:                              Gait:  Gait wnl general: R uncompensated trendelumburg                                          ROS    Assessment/Plan:    Patient is a 59 year old female with lumbar complaints.    Patient has the following significant findings with  corresponding treatment plan.                Diagnosis 1:  LBP  Pain -  hot/cold therapy  Decreased ROM/flexibility - manual therapy and therapeutic exercise  Decreased joint mobility - manual therapy and therapeutic exercise  Decreased strength - therapeutic exercise and therapeutic activities  Impaired muscle performance - neuro re-education  Decreased function - therapeutic activities  Impaired posture - neuro re-education    Therapy Evaluation Codes:   1) History comprised of:   Personal factors that impact the plan of care:      None.    Comorbidity factors that impact the plan of care are:      None.     Medications impacting care: None.  2) Examination of Body Systems comprised of:   Body structures and functions that impact the plan of care:      Lumbar spine.   Activity limitations that impact the plan of care are:      Bending and Lifting.  3) Clinical presentation characteristics are:   Stable/Uncomplicated.  4) Decision-Making    Low complexity using standardized patient assessment instrument and/or measureable assessment of functional outcome.  Cumulative Therapy Evaluation is: Low complexity.    Previous and current functional limitations:  (See Goal Flow Sheet for this information)    Short term and Long term goals: (See Goal Flow Sheet for this information)     Communication ability:  Patient appears to be able to clearly communicate and understand verbal and written communication and follow directions correctly.  Treatment Explanation - The following has been discussed with the patient:   RX ordered/plan of care  Anticipated outcomes  Possible risks and side effects  This patient would benefit from PT intervention to resume normal activities.   Rehab potential is good.    Frequency:  1 X week, once daily  Duration:  for 6 weeks  Discharge Plan:  Achieve all LTG.  Independent in home treatment program.  Return to work with or without restrictions.  Return to previous functional level by  discharge.  Reach maximal therapeutic benefit.    Please refer to the daily flowsheet for treatment today, total treatment time and time spent performing 1:1 timed codes.

## 2019-08-21 ENCOUNTER — TELEPHONE (OUTPATIENT)
Dept: FAMILY MEDICINE | Facility: CLINIC | Age: 59
End: 2019-08-21

## 2019-08-21 NOTE — TELEPHONE ENCOUNTER
Fine with me. Just write another generic letter.  Also remind her to see sports meds if she does not improve.

## 2019-08-21 NOTE — LETTER
August 21, 2019      Ruthann Beck  [address]  [facility] [address]        To Whom It May Concern:    Ruthann Beck is a patient under my medical care. Ruthann was seen in our clinic on 08/16/2019.  Please excuse her from work until 09/02/2019.        Sincerely,      Dionisio Spain MD

## 2019-08-21 NOTE — TELEPHONE ENCOUNTER
"Dr. Spain,  Called patient, patient states you requested she call with an updated at her office visit on 08/16/2019    Patient states she is still experiencing mild pain/discomfort from her back injury. She states pain is worse in the morning and gets better during the day. She states she still feels a \"block\" in her back. She was unable to provide a 1-10 rating. She has been taking hot baths. She declines using OTC medications, states she does not like to take medication. She has been able to tolerate her pain, although it is interfering with her daily activities    She had first PT session yesterday, she was shown gentle stretching exercises. She will be doing these twice daily. She meets with PT again next week.     Patient is requesting another week off of work. Patient states her entire job is lifting, and she does not feel comfortable going back while she is still experiencing pain. Patient will need documentation to be submitted to her employer    Please advise    Thank You!  Christine Darden RN  Triage Nurse  "

## 2019-08-21 NOTE — TELEPHONE ENCOUNTER
Dr. Spain,   Letter cued for review/signature    Triage: patient still needs to be notified    Thank You!  Christine Darden, RN  Triage Nurse

## 2019-08-21 NOTE — TELEPHONE ENCOUNTER
Called patient, informed patient of provider message below. Patient verbalized understanding. Patient states she will contact work and call clinic back with a fax number    HW Reception: when we receive fax number from patient, please fax them signed letter    Thank You!  Christine Darden RN  Triage Nurse

## 2019-08-21 NOTE — TELEPHONE ENCOUNTER
Reason for Call:  Other call back    Detailed comments: Patient is requesting a call back to discuss current status of her back injury / return to work instructions. Please assist. Thanks!    Phone Number Patient can be reached at: Cell number on file:    Telephone Information:   Mobile [phone]     Best Time: Any    Can we leave a detailed message on this number? YES    Call taken on 8/21/2019 at 8:43 AM by Tiki Melchor

## 2019-08-21 NOTE — TELEPHONE ENCOUNTER
Patient returned call, letter should be faxed to [phone] att: Andressa    Thank You!  Christine Darden, SANDRITA  Triage Nurse

## 2019-08-27 ENCOUNTER — TELEPHONE (OUTPATIENT)
Dept: FAMILY MEDICINE | Facility: CLINIC | Age: 59
End: 2019-08-27

## 2019-08-28 NOTE — TELEPHONE ENCOUNTER
Called patient, left voicemail to return call to clinic.    Writer would like to speak with patient to determine when she can return to work and with what restrictions    Christine Darden, RN  Triage Nurse

## 2019-08-28 NOTE — TELEPHONE ENCOUNTER
"Dr. Spain:  Spoke with patient, patient states she is still experiencing \"block\" in back. She is no longer experiencing pain.    Patient states she has spoke with [employer] in regards to going back to \"light duty\". 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 She states no one from her work has spoken with her and she does not agree to going back to work yet    Patient is wondering what plan should be going forward. She states she is unsure when she should assess if she is able to lift as she previously was.     Should patient follow up in clinic or with sports medicine to determine further restrictions and plan for work?    Please advise    Thank You!  Christine Darden, SANDRITA  Triage Nurse  "

## 2019-08-28 NOTE — TELEPHONE ENCOUNTER
Per Jacqueline PAZ MA, on Aug 27:    Laura, from [facility] [employer], and calling about the pt's worker's comp.  She is wondering why the pt can't go to work and be on restrictions vs having more time off? ( I guess Judit wrote her a letter letting her return to work next week) and why is the provider giving her the time off?  her phone number is [phone] Laura   I told her I will give this to my supervisor!    Aug 28:  Attempted to return call.  The number above is not a working number.    Reception/TC/Triage:  If Laura contacts clinic again the answer for the insurance company is:  The provider assessed and treated the patient and only he determines the length of her leave and restrictions when she returns. It is not appropriate for the insurance company to second guess the provider.  If they feel it is not appropriate, they can require the patient to be seen again for a re-assessment and plan.      Chantal Evans, MSN, RN  Patient Care Supervisor  [facility] and  [facility]

## 2019-08-28 NOTE — TELEPHONE ENCOUNTER
Laura returned call. States the patients employer is more than willing to consent to light duty, that she can sit and stand and take needed breaks. States she also spoke with the employee (patient) who feels capable of doing that. Please edit / addend the return to work note with this information and fax back to [phone]. Thanks!

## 2019-08-29 ENCOUNTER — THERAPY VISIT (OUTPATIENT)
Dept: PHYSICAL THERAPY | Facility: CLINIC | Age: 59
End: 2019-08-29
Payer: OTHER MISCELLANEOUS

## 2019-08-29 DIAGNOSIS — M54.50 BILATERAL LOW BACK PAIN WITHOUT SCIATICA: Primary | ICD-10-CM

## 2019-08-29 PROCEDURE — 97110 THERAPEUTIC EXERCISES: CPT | Mod: GP | Performed by: PHYSICAL THERAPIST

## 2019-08-29 PROCEDURE — 97530 THERAPEUTIC ACTIVITIES: CPT | Mod: GP | Performed by: PHYSICAL THERAPIST

## 2019-08-30 ENCOUNTER — TELEPHONE (OUTPATIENT)
Dept: FAMILY MEDICINE | Facility: CLINIC | Age: 59
End: 2019-08-30

## 2019-08-30 NOTE — TELEPHONE ENCOUNTER
Dr Spain,    Pt has appt on 9/4/19 in Sports Med      She wants to know if you can give her a letter for just the two days for going back to work for Sept 3 and 4 only. Then she will get further work parameters from ortho.  She said with no lifting would be ok      I have changed letter to that effect . Is this ok?      Cortney Mclean, RN, BSN

## 2019-08-30 NOTE — TELEPHONE ENCOUNTER
Patient calling requesting note for work stating ok for her to return to work on Tuesday, 9-3-19, with work restriction of not lifting anything over 5 lbs.    Please fax to her employer:  [employer] [place], fax# [phone]    Please call patient with any questions

## 2019-08-30 NOTE — TELEPHONE ENCOUNTER
Please see my previous notes and phone calls -if she continues to require work restrictions she should follow-up with sports medicine and she should request work restriction as per the recommendations.

## 2019-08-30 NOTE — LETTER
[facility]  [address] [facility] [address] [phone]          August 30, 2019    RE:  Ruthann Beck                                                                                                                  [address] [facility] [address]            To whom it may concern:    Ruthann may  return to work on Tuesday, 9-3-19 and 9/4/19 with no lifting more than 5 lbs allowed.    She will be seeing specialist in clinic on 9/5 and further work parameters will be determined then.     Sincerely,         Dionisio Spain MD/davion

## 2019-09-04 ENCOUNTER — OFFICE VISIT (OUTPATIENT)
Dept: ORTHOPEDICS | Facility: CLINIC | Age: 59
End: 2019-09-04
Payer: OTHER MISCELLANEOUS

## 2019-09-04 VITALS
SYSTOLIC BLOOD PRESSURE: 124 MMHG | WEIGHT: 151 LBS | BODY MASS INDEX: 25.16 KG/M2 | DIASTOLIC BLOOD PRESSURE: 70 MMHG | HEIGHT: 65 IN

## 2019-09-04 DIAGNOSIS — M51.369 LUMBAR DEGENERATIVE DISC DISEASE: Primary | ICD-10-CM

## 2019-09-04 DIAGNOSIS — M47.816 LUMBAR FACET ARTHROPATHY: ICD-10-CM

## 2019-09-04 PROCEDURE — 99203 OFFICE O/P NEW LOW 30 MIN: CPT | Performed by: FAMILY MEDICINE

## 2019-09-04 ASSESSMENT — MIFFLIN-ST. JEOR: SCORE: 1265.81

## 2019-09-04 NOTE — PROGRESS NOTES
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 This is a work related injury.  Occurred . Unable to bend over the next day.     Location of Pain: right low back , nonradiating   Duration of Pain: 3 week(s)  Rating of Pain at worst: 7/10  Rating of Pain Currently: 2/10  Pain is better with: activity avoidance   Pain is worse with: self care and work responsibilities  Treatment so far consists of: physical therapy, ibuprofen   Associated symptoms: no distal numbness or tingling; denies swelling or warmth  Recent imaging completed: X-rays completed 19.  Prior History of related problems: one major back problem 10 years ago related to running    Has been on work restriction past 2 weeks.       Social History: is employed as a/an baker      Past Medical History:   Diagnosis Date     NO ACTIVE PROBLEMS        Patient Active Problem List    Diagnosis Date Noted     Left shoulder pain 2017     Priority: Medium     Left knee pain 2016     Priority: Medium     Right knee pain 2016     Priority: Medium     Hip pain 10/05/2011     Priority: Medium     Common wart 2011     Priority: Medium     right thumb web       CARDIOVASCULAR SCREENING; LDL GOAL LESS THAN 160 2010     Priority: Medium     Lumbar radiculopathy 2008     Priority: Medium       Family History   Problem Relation Age of Onset     Depression Mother         manic depressive     Hypertension Father      Cardiovascular Father         stroke  at age 71     Arthritis Father         gout     Diabetes Maternal Grandmother      Breast Cancer Paternal Grandmother      Family History Negative Sister      Family History  "Negative Sister      Family History Negative Sister      Family History Negative Brother      Family History Negative Brother        Social History     Socioeconomic History     Marital status:      Spouse name: Alon     Number of children: 2     Years of education: Not on file     Highest education level: Not on file   Occupational History     Occupation: baker     Employer: [employer]     Comment: baker [employer]   Social Needs     Financial resource strain: Not on file     Food insecurity:     Worry: Not on file     Inability: Not on file     Transportation needs:     Medical: Not on file     Non-medical: Not on file   Tobacco Use     Smoking status: Never Smoker     Smokeless tobacco: Never Used   Substance and Sexual Activity     Alcohol use: Yes     Comment: 1 beer or glass of wine a day     Drug use: No       Past Surgical History:   Procedure Laterality Date     C APPENDECTOMY  9/09     C EXCISION OF LINGUAL TONSIL       C LEG/ANKLE SURGERY PROC UNLISTED  1980    fx ankle, pins and screws placed     CRYOCAUTERY OF CERVIX  1982             Review of Systems   Musculoskeletal: Positive for back pain.   All other systems reviewed and are negative.        Physical Exam  /70   Ht 1.651 m (5' 5\")   Wt 68.5 kg (151 lb)   LMP 04/05/2010   BMI 25.13 kg/m    Constitutional:well-developed, well-nourished, and in no distress.   Cardiovascular: Intact distal pulses.    Neurological: alert. Gait Normal:   Gait, station, stance, and balance appear normal for age  Skin: Skin is warm and dry.   Psychiatric: Mood and affect normal.   Respiratory: unlabored, speaks in full sentences  Lymph: no LAD, no lymphangitis            Back Exam     Tenderness   The patient is experiencing no tenderness.     Range of Motion   Extension: normal   Flexion: normal   Lateral bend right: normal   Lateral bend left: normal   Rotation right: normal   Rotation left: normal     Muscle Strength   The patient has normal back " strength.    Tests   Straight leg raise right: negative  Straight leg raise left: negative    Other   Toe walk: normal  Heel walk: normal  Sensation: normal  Gait: normal   Erythema: no back redness  Scars: absent    Comments:  Good range of motion, mild pain at end range of extension > flexion.                     X-ray images Ordered and independently reviewed by me in the office today with the patient. X-ray shows:     Recent Results (from the past 744 hour(s))   XR Lumbar Spine 2/3 Views    Narrative    LUMBAR SPINE TWO TO THREE VIEWS   8/16/2019 2:20 PM     HISTORY: Right lumbar radiculitis    COMPARISON: None.       Impression    IMPRESSION: Mild convex left curvature of the lumbar spine centered at  L3. Anteroposterior alignment spine is within normal limits. No loss  of vertebral body height. Mild loss of intervertebral disc height with  degenerative endplate changes in the lower lumbar spine. Moderate  facet hypertrophy in the lower lumbar spine particularly at L4-L5 and  L5-S1.    Large stool burden projects over the colon.    AVERY HENLEY MD     ASSESSMENT/PLAN    ICD-10-CM    1. Lumbar degenerative disc disease M51.36    2. Lumbar facet arthropathy M47.816      Lumbar degenerative disc disease, improving physical therapy.  She has been able to work without restrictions.  Reassurance, no evidence of radiculopathy.  Discussed long-term prognosis, and this may be something that comes and goes, but best preventative efforts involving maintaining spinal strength, cardiovascular activity, and caution with lifting.  Discussed increasing dose of ibuprofen if needed.  She will continue her current physical therapy and progress as tolerated.  Return if plateauing in progress or worsening.

## 2019-09-04 NOTE — LETTER
SPORTS MEDICINE  [address]  [facility] [place] [facility] [address]  Phone: [phone]  Fax: [phone]    09/04/19    Ruthann Chawla Ebony  [address] [facility] [address]      To whom it may concern:     Ruthann was seen today for these diagnoses:       Lumbar degenerative disc disease  Lumbar facet arthropathy      She may return to regular hours with the following restrictions:    - no lifting over 20 lbs.  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    These are in effect for the next 4 weeks while she continues PHYSICAL THERAPY .    These restrictions apply to work as well as home functions.     Sincerely,      Jordan Ma MD

## 2019-09-04 NOTE — LETTER
9/4/2019         RE: Ruthann Beck  [address]  [place] [facility] [address]        Dear Colleague,    Thank you for referring your patient, Ruthann Beck, to the  SPORTS MEDICINE. Please see a copy of my visit note below.    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 This is a work related injury.  Occurred 8/14. Unable to bend over the next day.     Location of Pain: right low back , nonradiating   Duration of Pain: 3 week(s)  Rating of Pain at worst: 7/10  Rating of Pain Currently: 2/10  Pain is better with: activity avoidance   Pain is worse with: self care and work responsibilities  Treatment so far consists of: physical therapy, ibuprofen   Associated symptoms: no distal numbness or tingling; denies swelling or warmth  Recent imaging completed: X-rays completed 8/16/19.  Prior History of related problems: one major back problem 10 years ago related to running    Has been on work restriction past 2 weeks.       Social History: is employed as a/an baker      Past Medical History:   Diagnosis Date     NO ACTIVE PROBLEMS        Patient Active Problem List    Diagnosis Date Noted     Left shoulder pain 08/22/2017     Priority: Medium     Left knee pain 07/22/2016     Priority: Medium     Right knee pain 07/22/2016     Priority: Medium     Hip pain 10/05/2011     Priority: Medium     Common wart 08/05/2011     Priority: Medium     right thumb web       CARDIOVASCULAR SCREENING; LDL GOAL LESS THAN 160 05/09/2010     Priority: Medium     Lumbar radiculopathy 08/13/2008     Priority: Medium       Family History   Problem Relation Age of Onset     Depression Mother         manic  "depressive     Hypertension Father      Cardiovascular Father         stroke  at age 71     Arthritis Father         gout     Diabetes Maternal Grandmother      Breast Cancer Paternal Grandmother      Family History Negative Sister      Family History Negative Sister      Family History Negative Sister      Family History Negative Brother      Family History Negative Brother        Social History     Socioeconomic History     Marital status:      Spouse name: Alon     Number of children: 2     Years of education: Not on file     Highest education level: Not on file   Occupational History     Occupation: baker     Employer: [employer]     Comment: baker [employer]   Social Needs     Financial resource strain: Not on file     Food insecurity:     Worry: Not on file     Inability: Not on file     Transportation needs:     Medical: Not on file     Non-medical: Not on file   Tobacco Use     Smoking status: Never Smoker     Smokeless tobacco: Never Used   Substance and Sexual Activity     Alcohol use: Yes     Comment: 1 beer or glass of wine a day     Drug use: No       Past Surgical History:   Procedure Laterality Date     C APPENDECTOMY       C EXCISION OF LINGUAL TONSIL       C LEG/ANKLE SURGERY PROC UNLISTED      fx ankle, pins and screws placed     CRYOCAUTERY OF CERVIX               Review of Systems   Musculoskeletal: Positive for back pain.   All other systems reviewed and are negative.        Physical Exam  /70   Ht 1.651 m (5' 5\")   Wt 68.5 kg (151 lb)   LMP 2010   BMI 25.13 kg/m     Constitutional:well-developed, well-nourished, and in no distress.   Cardiovascular: Intact distal pulses.    Neurological: alert. Gait Normal:   Gait, station, stance, and balance appear normal for age  Skin: Skin is warm and dry.   Psychiatric: Mood and affect normal.   Respiratory: unlabored, speaks in full sentences  Lymph: no LAD, no lymphangitis            Back Exam     Tenderness "   The patient is experiencing no tenderness.     Range of Motion   Extension: normal   Flexion: normal   Lateral bend right: normal   Lateral bend left: normal   Rotation right: normal   Rotation left: normal     Muscle Strength   The patient has normal back strength.    Tests   Straight leg raise right: negative  Straight leg raise left: negative    Other   Toe walk: normal  Heel walk: normal  Sensation: normal  Gait: normal   Erythema: no back redness  Scars: absent    Comments:  Good range of motion, mild pain at end range of extension > flexion.                     X-ray images Ordered and independently reviewed by me in the office today with the patient. X-ray shows:     Recent Results (from the past 744 hour(s))   XR Lumbar Spine 2/3 Views    Narrative    LUMBAR SPINE TWO TO THREE VIEWS   8/16/2019 2:20 PM     HISTORY: Right lumbar radiculitis    COMPARISON: None.       Impression    IMPRESSION: Mild convex left curvature of the lumbar spine centered at  L3. Anteroposterior alignment spine is within normal limits. No loss  of vertebral body height. Mild loss of intervertebral disc height with  degenerative endplate changes in the lower lumbar spine. Moderate  facet hypertrophy in the lower lumbar spine particularly at L4-L5 and  L5-S1.    Large stool burden projects over the colon.    AVERY HENLEY MD     ASSESSMENT/PLAN    ICD-10-CM    1. Lumbar degenerative disc disease M51.36    2. Lumbar facet arthropathy M47.816      Lumbar degenerative disc disease, improving physical therapy.  She has been able to work without restrictions.  Reassurance, no evidence of radiculopathy.  Discussed long-term prognosis, and this may be something that comes and goes, but best preventative efforts involving maintaining spinal strength, cardiovascular activity, and caution with lifting.  Discussed increasing dose of ibuprofen if needed.  She will continue her current physical therapy and progress as tolerated.  Return if  plateauing in progress or worsening.    Again, thank you for allowing me to participate in the care of your patient.        Sincerely,        Jordan Ma MD

## 2019-09-08 ASSESSMENT — ENCOUNTER SYMPTOMS: BACK PAIN: 1

## 2019-09-17 ENCOUNTER — THERAPY VISIT (OUTPATIENT)
Dept: PHYSICAL THERAPY | Facility: CLINIC | Age: 59
End: 2019-09-17
Payer: OTHER MISCELLANEOUS

## 2019-09-17 DIAGNOSIS — M54.16 RIGHT LUMBAR RADICULITIS: ICD-10-CM

## 2019-09-17 PROCEDURE — 97530 THERAPEUTIC ACTIVITIES: CPT | Mod: GP | Performed by: PHYSICAL THERAPIST

## 2019-09-17 PROCEDURE — 97110 THERAPEUTIC EXERCISES: CPT | Mod: GP | Performed by: PHYSICAL THERAPIST

## 2019-09-26 ENCOUNTER — THERAPY VISIT (OUTPATIENT)
Dept: PHYSICAL THERAPY | Facility: CLINIC | Age: 59
End: 2019-09-26
Payer: OTHER MISCELLANEOUS

## 2019-09-26 DIAGNOSIS — M54.50 BILATERAL LOW BACK PAIN WITHOUT SCIATICA: Primary | ICD-10-CM

## 2019-09-26 PROCEDURE — 97110 THERAPEUTIC EXERCISES: CPT | Mod: GP | Performed by: PHYSICAL THERAPIST

## 2019-09-26 PROCEDURE — 97530 THERAPEUTIC ACTIVITIES: CPT | Mod: GP | Performed by: PHYSICAL THERAPIST

## 2019-09-26 PROCEDURE — 97112 NEUROMUSCULAR REEDUCATION: CPT | Mod: GP | Performed by: PHYSICAL THERAPIST

## 2019-10-02 ENCOUNTER — OFFICE VISIT (OUTPATIENT)
Dept: ORTHOPEDICS | Facility: CLINIC | Age: 59
End: 2019-10-02
Payer: OTHER MISCELLANEOUS

## 2019-10-02 VITALS
DIASTOLIC BLOOD PRESSURE: 74 MMHG | SYSTOLIC BLOOD PRESSURE: 122 MMHG | HEIGHT: 65 IN | WEIGHT: 151 LBS | BODY MASS INDEX: 25.16 KG/M2

## 2019-10-02 DIAGNOSIS — M54.50 CHRONIC RIGHT-SIDED LOW BACK PAIN WITHOUT SCIATICA: ICD-10-CM

## 2019-10-02 DIAGNOSIS — G89.29 CHRONIC RIGHT-SIDED LOW BACK PAIN WITHOUT SCIATICA: ICD-10-CM

## 2019-10-02 DIAGNOSIS — M51.369 LUMBAR DEGENERATIVE DISC DISEASE: Primary | ICD-10-CM

## 2019-10-02 PROCEDURE — 99213 OFFICE O/P EST LOW 20 MIN: CPT | Performed by: FAMILY MEDICINE

## 2019-10-02 ASSESSMENT — ENCOUNTER SYMPTOMS: BACK PAIN: 1

## 2019-10-02 ASSESSMENT — MIFFLIN-ST. JEOR: SCORE: 1265.81

## 2019-10-02 NOTE — LETTER
SPORTS MEDICINE  [address]  [facility] [address]  Phone: [phone]  Fax: [phone]    10/2/2019      Ruthann Chawla Ebony  [address] [facility] [address]      To whom it may concern:     Ruthann was seen today for these diagnoses:       Lumbar degenerative disc disease  Chronic right-sided low back pain without sciatica      She may return to regular hours with the following restrictions:    - no lifting over 20 lbs.  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    These are in effect for the next 4 weeks while she continues PHYSICAL THERAPY .    These restrictions apply to work as well as home functions.     Sincerely,      Jordan Ma MD

## 2019-10-02 NOTE — PROGRESS NOTES
[facility]   Follow-up Visit s Oct 2, 2019    PCP: Glory Bass      Subjective:  Ruthann is a 59 year old female who is seen in follow up for evaluation of   Chief Complaint   Patient presents with     Lower Back - RECHECK     Her last visit was on 9/4/2019.  Since that time, symptoms have been better than before. Ruthann Beck is accompanied today by self.     Patient reports 50% improvement since last visit. Still working under restrictions  Patient has noticed improved symptoms with physical therapy and ibuprofen treatment, 400 mg every other day.     Planning to quit job next week. Has been working 4 hours daily on light duty and 4 hours daily sitting.     Pain is located low back right, non radiating, getting progressively better.  Patient is using no aids.    Patient denies any new injuries.    Patient's past medical, surgical, social and family histories are reviewed today.    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 This is a work related injury.  Occurred 8/14. Unable to bend over the next day.     Location of Pain: right low back , nonradiating   Duration of Pain: 3 week(s)  Rating of Pain at worst: 7/10  Rating of Pain Currently: 2/10  Pain is better with: activity avoidance   Pain is worse with: self care and work responsibilities  Treatment so far consists of: physical therapy, ibuprofen   Associated symptoms: no distal numbness or tingling; denies swelling or warmth  Recent imaging completed: X-rays completed 8/16/19.  Prior History of related problems: one major back problem 10 years ago related to running    Has been on work restriction past 2 weeks.       Social History: is employed as a/an baker      Past Medical History:   Diagnosis Date     NO ACTIVE PROBLEMS        Patient Active Problem List    Diagnosis Date Noted     Lumbar facet  arthropathy 2019     Priority: Medium     Lumbar degenerative disc disease 2019     Priority: Medium     Left shoulder pain 2017     Priority: Medium     Left knee pain 2016     Priority: Medium     Right knee pain 2016     Priority: Medium     Hip pain 10/05/2011     Priority: Medium     Common wart 2011     Priority: Medium     right thumb web       CARDIOVASCULAR SCREENING; LDL GOAL LESS THAN 160 2010     Priority: Medium     Lumbar radiculopathy 2008     Priority: Medium       Family History   Problem Relation Age of Onset     Depression Mother         manic depressive     Hypertension Father      Cardiovascular Father         stroke  at age 71     Arthritis Father         gout     Diabetes Maternal Grandmother      Breast Cancer Paternal Grandmother      Family History Negative Sister      Family History Negative Sister      Family History Negative Sister      Family History Negative Brother      Family History Negative Brother        Social History     Socioeconomic History     Marital status:      Spouse name: Alon     Number of children: 2     Years of education: Not on file     Highest education level: Not on file   Occupational History     Occupation: baker     Employer: [employer]     Comment: b[employer]   Social Needs     Financial resource strain: Not on file     Food insecurity:     Worry: Not on file     Inability: Not on file     Transportation needs:     Medical: Not on file     Non-medical: Not on file   Tobacco Use     Smoking status: Never Smoker     Smokeless tobacco: Never Used   Substance and Sexual Activity     Alcohol use: Yes     Comment: 1 beer or glass of wine a day     Drug use: No       Past Surgical History:   Procedure Laterality Date     C APPENDECTOMY       C EXCISION OF LINGUAL TONSIL       C LEG/ANKLE SURGERY PROC UNLISTED      fx ankle, pins and screws placed     CRYOCAUTERY OF CERVIX    "            Review of Systems   Musculoskeletal: Positive for back pain.   All other systems reviewed and are negative.        Physical Exam  /74   Ht 1.651 m (5' 5\")   Wt 68.5 kg (151 lb)   LMP 04/05/2010   BMI 25.13 kg/m    Constitutional:well-developed, well-nourished, and in no distress.   Cardiovascular: Intact distal pulses.    Neurological: alert. Gait Normal:   Gait, station, stance, and balance appear normal for age  Skin: Skin is warm and dry.   Psychiatric: Mood and affect normal.   Respiratory: unlabored, speaks in full sentences  Lymph: no LAD, no lymphangitis            Back Exam     Tenderness   The patient is experiencing no tenderness.     Range of Motion   Extension: normal   Flexion: normal   Lateral bend right: normal   Lateral bend left: normal   Rotation right: normal   Rotation left: normal     Muscle Strength   The patient has normal back strength.    Tests   Straight leg raise right: negative  Straight leg raise left: negative    Other   Toe walk: normal  Heel walk: normal  Sensation: normal  Gait: normal   Erythema: no back redness  Scars: absent    Comments:  Good range of motion, mild pain at end range of extension > flexion.               ASSESSMENT/PLAN    ICD-10-CM    1. Lumbar degenerative disc disease M51.36    2. Chronic right-sided low back pain without sciatica M54.5     G89.29      Slowly improving, she doesn't feel like she is able to continue working in the same field.   Declines oral steroids  Declines SI injection  Declines MRI and possible MAICOL.  Comfortable continuing with PHYSICAL THERAPY ongoing.     "

## 2019-10-02 NOTE — LETTER
10/2/2019         RE: Ruthann Beck  [address]  [facility] [address]        Dear Colleague,    Thank you for referring your patient, Ruthann Beck, to the  SPORTS MEDICINE. Please see a copy of my visit note below.    [facility]   Follow-up Visit s Oct 2, 2019    PCP: Glory Bass      Subjective:  Ruthann is a 59 year old female who is seen in follow up for evaluation of   Chief Complaint   Patient presents with     Lower Back - RECHECK     Her last visit was on 9/4/2019.  Since that time, symptoms have been better than before. Ruthann Beck is accompanied today by self.     Patient reports 50% improvement since last visit. Still working under restrictions  Patient has noticed improved symptoms with physical therapy and ibuprofen treatment, 400 mg every other day.     Planning to quit job next week. Has been working 4 hours daily on light duty and 4 hours daily sitting.     Pain is located low back right, non radiating, getting progressively better.  Patient is using no aids.    Patient denies any new injuries.    Patient's past medical, surgical, social and family histories are reviewed today.    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 This is a work related injury.  Occurred 8/14. Unable to bend over the next day.     Location of Pain: right low back , nonradiating   Duration of Pain: 3 week(s)  Rating of Pain at worst: 7/10  Rating of Pain Currently: 2/10  Pain is better with: activity avoidance   Pain is worse with: self care and work responsibilities  Treatment so far consists of: physical therapy, ibuprofen   Associated symptoms: no distal numbness or tingling; denies swelling or warmth  Recent imaging completed: X-rays completed 8/16/19.  Prior History of related problems: one major back problem 10 years  ago related to running    Has been on work restriction past 2 weeks.       Social History: is employed as a/an baker      Past Medical History:   Diagnosis Date     NO ACTIVE PROBLEMS        Patient Active Problem List    Diagnosis Date Noted     Lumbar facet arthropathy 2019     Priority: Medium     Lumbar degenerative disc disease 2019     Priority: Medium     Left shoulder pain 2017     Priority: Medium     Left knee pain 2016     Priority: Medium     Right knee pain 2016     Priority: Medium     Hip pain 10/05/2011     Priority: Medium     Common wart 2011     Priority: Medium     right thumb web       CARDIOVASCULAR SCREENING; LDL GOAL LESS THAN 160 2010     Priority: Medium     Lumbar radiculopathy 2008     Priority: Medium       Family History   Problem Relation Age of Onset     Depression Mother         manic depressive     Hypertension Father      Cardiovascular Father         stroke  at age 71     Arthritis Father         gout     Diabetes Maternal Grandmother      Breast Cancer Paternal Grandmother      Family History Negative Sister      Family History Negative Sister      Family History Negative Sister      Family History Negative Brother      Family History Negative Brother        Social History     Socioeconomic History     Marital status:      Spouse name: Alon     Number of children: 2     Years of education: Not on file     Highest education level: Not on file   Occupational History     Occupation: baker     Employer: [employer]     Comment: baker [employer]   Social Needs     Financial resource strain: Not on file     Food insecurity:     Worry: Not on file     Inability: Not on file     Transportation needs:     Medical: Not on file     Non-medical: Not on file   Tobacco Use     Smoking status: Never Smoker     Smokeless tobacco: Never Used   Substance and Sexual Activity     Alcohol use: Yes     Comment: 1 beer or glass of wine a day  "    Drug use: No       Past Surgical History:   Procedure Laterality Date     C APPENDECTOMY  9/09     C EXCISION OF LINGUAL TONSIL       C LEG/ANKLE SURGERY PROC UNLISTED  1980    fx ankle, pins and screws placed     CRYOCAUTERY OF CERVIX  1982             Review of Systems   Musculoskeletal: Positive for back pain.   All other systems reviewed and are negative.        Physical Exam  /74   Ht 1.651 m (5' 5\")   Wt 68.5 kg (151 lb)   LMP 04/05/2010   BMI 25.13 kg/m     Constitutional:well-developed, well-nourished, and in no distress.   Cardiovascular: Intact distal pulses.    Neurological: alert. Gait Normal:   Gait, station, stance, and balance appear normal for age  Skin: Skin is warm and dry.   Psychiatric: Mood and affect normal.   Respiratory: unlabored, speaks in full sentences  Lymph: no LAD, no lymphangitis            Back Exam     Tenderness   The patient is experiencing no tenderness.     Range of Motion   Extension: normal   Flexion: normal   Lateral bend right: normal   Lateral bend left: normal   Rotation right: normal   Rotation left: normal     Muscle Strength   The patient has normal back strength.    Tests   Straight leg raise right: negative  Straight leg raise left: negative    Other   Toe walk: normal  Heel walk: normal  Sensation: normal  Gait: normal   Erythema: no back redness  Scars: absent    Comments:  Good range of motion, mild pain at end range of extension > flexion.               ASSESSMENT/PLAN    ICD-10-CM    1. Lumbar degenerative disc disease M51.36    2. Chronic right-sided low back pain without sciatica M54.5     G89.29      Slowly improving, she doesn't feel like she is able to continue working in the same field.   Declines oral steroids  Declines SI injection  Declines MRI and possible MAICOL.  Comfortable continuing with PHYSICAL THERAPY ongoing.       Again, thank you for allowing me to participate in the care of your patient.        Sincerely,        Jordan Redman" MD Anil

## 2019-10-03 ENCOUNTER — HEALTH MAINTENANCE LETTER (OUTPATIENT)
Age: 59
End: 2019-10-03

## 2019-10-24 ENCOUNTER — THERAPY VISIT (OUTPATIENT)
Dept: PHYSICAL THERAPY | Facility: CLINIC | Age: 59
End: 2019-10-24
Payer: OTHER MISCELLANEOUS

## 2019-10-24 DIAGNOSIS — M54.50 BILATERAL LOW BACK PAIN WITHOUT SCIATICA: Primary | ICD-10-CM

## 2019-10-24 PROCEDURE — 97530 THERAPEUTIC ACTIVITIES: CPT | Mod: GP | Performed by: PHYSICAL THERAPIST

## 2019-10-24 PROCEDURE — 97110 THERAPEUTIC EXERCISES: CPT | Mod: GP | Performed by: PHYSICAL THERAPIST

## 2019-11-06 ENCOUNTER — OFFICE VISIT (OUTPATIENT)
Dept: ORTHOPEDICS | Facility: CLINIC | Age: 59
End: 2019-11-06
Payer: OTHER MISCELLANEOUS

## 2019-11-06 VITALS
HEIGHT: 65 IN | BODY MASS INDEX: 25.16 KG/M2 | DIASTOLIC BLOOD PRESSURE: 79 MMHG | WEIGHT: 151 LBS | SYSTOLIC BLOOD PRESSURE: 124 MMHG

## 2019-11-06 DIAGNOSIS — M51.369 LUMBAR DEGENERATIVE DISC DISEASE: Primary | ICD-10-CM

## 2019-11-06 DIAGNOSIS — M47.816 LUMBAR FACET ARTHROPATHY: ICD-10-CM

## 2019-11-06 PROCEDURE — 99214 OFFICE O/P EST MOD 30 MIN: CPT | Performed by: FAMILY MEDICINE

## 2019-11-06 ASSESSMENT — ENCOUNTER SYMPTOMS: BACK PAIN: 1

## 2019-11-06 ASSESSMENT — MIFFLIN-ST. JEOR: SCORE: 1265.81

## 2019-11-06 NOTE — LETTER
"             SPORTS MEDICINE  [address] [place] [address] [address]  [facility] [address]  Phone: [phone]  Fax: [phone]    11/6/2019      Ruthann Prietolain  [address]  [facility] [address]      To whom it may concern:     Ruthann was seen today for these diagnoses:       Lumbar degenerative disc disease  Chronic right-sided low back pain without sciatica    She has a letter from the [place] Department of [employer]employment Insurance that states she is \"unable to perform paid employment\".    This is not the case.    The following restriction were written at her last appointment on 10/2/19, but the restrictions in NO WAY prohibit paid employment.    She may return to regular hours with the following restrictions:    - no lifting over 20 lbs.  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    However, she no longer needs to adhere to those restrictions. In fact, she is now capable of lifting 30lbs, and may perform standing and seated duties without restrictions,      Sincerely,      Jordan Feroz Ma MD            "

## 2019-11-06 NOTE — PROGRESS NOTES
[facility]   Follow-up Visit s Nov 6, 2019    PCP: Glory Bass      Subjective:  Ruthann is a 59 year old female who is seen in follow up for evaluation of   Chief Complaint   Patient presents with     Lower Back - RECHECK     Her last visit was on 10/2/2019.  Since that time, symptoms have been unchanged. Ruthann Beck is accompanied today by self.       Patient has noticed a stable course of symptoms with physical therapy treatment. Saw PHYSICAL THERAPY last week, has home program, no longer anticipates need to go to scheduled therapy.     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    Doing well. Continues PHYSICAL THERAPY. Has returned to swimming.     Trying to obtain retraining through state unemployment agency.  Has letter stating that she is unable to perform any paid employment.    Pain is located right low back, persistent.  Patient is using no aids.    Patient denies any new injuries.    Patient's past medical, surgical, social and family histories are reviewed today.    History from previous visit on 10/2/2019  Her last visit was on 9/4/2019.  Since that time, symptoms have been better than before. Ruthann Beck is accompanied today by self.     Patient reports 50% improvement since last visit. Still working under restrictions  Patient has noticed improved symptoms with physical therapy and ibuprofen treatment, 400 mg every other day.     Planning to quit job next week. Has been working 4 hours daily on light duty and 4 hours daily sitting.     Pain is located low back right, non radiating, getting progressively better.  Patient is using no aids.    Patient denies any new injuries.    Patient's past medical, surgical, social and family histories are reviewed today.    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 This is a work related  injury.  Occurred . Unable to bend over the next day.     Location of Pain: right low back , nonradiating   Duration of Pain: 3 week(s)  Rating of Pain at worst: 7/10  Rating of Pain Currently: 2/10  Pain is better with: activity avoidance   Pain is worse with: self care and work responsibilities  Treatment so far consists of: physical therapy, ibuprofen   Associated symptoms: no distal numbness or tingling; denies swelling or warmth  Recent imaging completed: X-rays completed 19.  Prior History of related problems: one major back problem 10 years ago related to running    Has been on work restriction past 2 weeks.       Social History: is employed as a/an baker      Past Medical History:   Diagnosis Date     NO ACTIVE PROBLEMS        Patient Active Problem List    Diagnosis Date Noted     Chronic right-sided low back pain without sciatica 10/02/2019     Priority: Medium     Lumbar facet arthropathy 2019     Priority: Medium     Lumbar degenerative disc disease 2019     Priority: Medium     Left shoulder pain 2017     Priority: Medium     Left knee pain 2016     Priority: Medium     Right knee pain 2016     Priority: Medium     Hip pain 10/05/2011     Priority: Medium     Common wart 2011     Priority: Medium     right thumb web       CARDIOVASCULAR SCREENING; LDL GOAL LESS THAN 160 2010     Priority: Medium     Lumbar radiculopathy 2008     Priority: Medium       Family History   Problem Relation Age of Onset     Depression Mother         manic depressive     Hypertension Father      Cardiovascular Father         stroke  at age 71     Arthritis Father         gout     Diabetes Maternal Grandmother      Breast Cancer Paternal Grandmother      Family History Negative Sister      Family History Negative Sister      Family History Negative Sister      Family History Negative Brother      Family History Negative Brother        Social History     Socioeconomic  "History     Marital status:      Spouse name: Alon     Number of children: 2     Years of education: Not on file     Highest education level: Not on file   Occupational History     Occupation: baker     Employer: [employer]     Comment: b[employer]   Social Needs     Financial resource strain: Not on file     Food insecurity:     Worry: Not on file     Inability: Not on file     Transportation needs:     Medical: Not on file     Non-medical: Not on file   Tobacco Use     Smoking status: Never Smoker     Smokeless tobacco: Never Used   Substance and Sexual Activity     Alcohol use: Yes     Comment: 1 beer or glass of wine a day     Drug use: No       Past Surgical History:   Procedure Laterality Date     C APPENDECTOMY  9/09     C EXCISION OF LINGUAL TONSIL       C LEG/ANKLE SURGERY PROC UNLISTED  1980    fx ankle, pins and screws placed     CRYOCAUTERY OF CERVIX  1982             Review of Systems   Musculoskeletal: Positive for back pain.   All other systems reviewed and are negative.        Physical Exam  /79   Ht 1.651 m (5' 5\")   Wt 68.5 kg (151 lb)   LMP 04/05/2010   BMI 25.13 kg/m    Constitutional:well-developed, well-nourished, and in no distress.   Cardiovascular: Intact distal pulses.    Neurological: alert. Gait Normal:   Gait, station, stance, and balance appear normal for age  Skin: Skin is warm and dry.   Psychiatric: Mood and affect normal.   Respiratory: unlabored, speaks in full sentences  Lymph: no LAD, no lymphangitis            Back Exam     Tenderness   The patient is experiencing no tenderness.     Range of Motion   Extension: normal   Flexion: normal   Lateral bend right: normal   Lateral bend left: normal   Rotation right: normal   Rotation left: normal     Muscle Strength   The patient has normal back strength.    Tests   Straight leg raise right: negative  Straight leg raise left: negative    Other   Toe walk: normal  Heel walk: normal  Sensation: normal  Gait: " normal   Erythema: no back redness  Scars: absent    Comments:  Good range of motion, mild pain at end range of extension > flexion.               ASSESSMENT/PLAN    ICD-10-CM    1. Lumbar degenerative disc disease M51.36    2. Lumbar facet arthropathy M47.816       Continued improvement.  Essentially asymptomatic at this point.  She continues to decline further treatment other than physical therapy.  She is certainly capable of performing work within the restrictions given at her last visit, as she was doing before she quit that job.  Additionally, we agreed that she could actually perform more lifting and is not required to be sitting for half of her work time at this point.  These prior and current restrictions were expressly stated in a letter given to the patient indicating that she certainly can perform paid employment.  Follow-up as needed.    Time spent in one-on-one evalution and discussion with an established patient of this clinic.  regarding nature of problem, course, prior treatments, and therapeutic options, at least 50% of which was spent in counseling and coordination of care: 25 minutes, particularly in reviewing course and current symptoms as well as detailed elaboration of prior work restrictions and current capacities, and writing a letter with the patient's assistance documenting this.

## 2019-11-06 NOTE — LETTER
11/6/2019         RE: Ruthann Beck  [address]  [facility] [address]        Dear Colleague,    Thank you for referring your patient, Ruthann Beck, to the  SPORTS MEDICINE. Please see a copy of my visit note below.    [facility]   Follow-up Visit s Nov 6, 2019    PCP: Glory Bass Y      Subjective:  Ruthann is a 59 year old female who is seen in follow up for evaluation of   Chief Complaint   Patient presents with     Lower Back - RECHECK     Her last visit was on 10/2/2019.  Since that time, symptoms have been unchanged. Ruthann Beck is accompanied today by self.       Patient has noticed a stable course of symptoms with physical therapy treatment. Saw PHYSICAL THERAPY last week, has home program, no longer anticipates need to go to scheduled therapy.     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    Doing well. Continues PHYSICAL THERAPY. Has returned to swimming.     Trying to obtain retraining through state unemployment agency.  Has letter stating that she is unable to perform any paid employment.    Pain is located right low back, persistent.  Patient is using no aids.    Patient denies any new injuries.    Patient's past medical, surgical, social and family histories are reviewed today.    History from previous visit on 10/2/2019  Her last visit was on 9/4/2019.  Since that time, symptoms have been better than before. Ruthann Beck is accompanied today by self.     Patient reports 50% improvement since last visit. Still working under restrictions  Patient has noticed improved symptoms with physical therapy and ibuprofen treatment, 400 mg every other day.     Planning to quit job next week. Has been working 4 hours daily on light duty and 4 hours daily sitting.     Pain is located low back right, non radiating, getting progressively better.  Patient is using no aids.    Patient denies any new injuries.    Patient's  past medical, surgical, social and family histories are reviewed today.    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 This is a work related injury.  Occurred . Unable to bend over the next day.     Location of Pain: right low back , nonradiating   Duration of Pain: 3 week(s)  Rating of Pain at worst: 7/10  Rating of Pain Currently: 2/10  Pain is better with: activity avoidance   Pain is worse with: self care and work responsibilities  Treatment so far consists of: physical therapy, ibuprofen   Associated symptoms: no distal numbness or tingling; denies swelling or warmth  Recent imaging completed: X-rays completed 19.  Prior History of related problems: one major back problem 10 years ago related to running    Has been on work restriction past 2 weeks.       Social History: is employed as a/an baker      Past Medical History:   Diagnosis Date     NO ACTIVE PROBLEMS        Patient Active Problem List    Diagnosis Date Noted     Chronic right-sided low back pain without sciatica 10/02/2019     Priority: Medium     Lumbar facet arthropathy 2019     Priority: Medium     Lumbar degenerative disc disease 2019     Priority: Medium     Left shoulder pain 2017     Priority: Medium     Left knee pain 2016     Priority: Medium     Right knee pain 2016     Priority: Medium     Hip pain 10/05/2011     Priority: Medium     Common wart 2011     Priority: Medium     right thumb web       CARDIOVASCULAR SCREENING; LDL GOAL LESS THAN 160 2010     Priority: Medium     Lumbar radiculopathy 2008     Priority: Medium       Family History   Problem Relation Age of Onset     Depression Mother         manic depressive     Hypertension Father      Cardiovascular Father         stroke  at age 71     Arthritis Father         gout     Diabetes Maternal Grandmother       "Breast Cancer Paternal Grandmother      Family History Negative Sister      Family History Negative Sister      Family History Negative Sister      Family History Negative Brother      Family History Negative Brother        Social History     Socioeconomic History     Marital status:      Spouse name: Alon     Number of children: 2     Years of education: Not on file     Highest education level: Not on file   Occupational History     Occupation: baker     Employer: [employer]     Comment: b[employer]   Social Needs     Financial resource strain: Not on file     Food insecurity:     Worry: Not on file     Inability: Not on file     Transportation needs:     Medical: Not on file     Non-medical: Not on file   Tobacco Use     Smoking status: Never Smoker     Smokeless tobacco: Never Used   Substance and Sexual Activity     Alcohol use: Yes     Comment: 1 beer or glass of wine a day     Drug use: No       Past Surgical History:   Procedure Laterality Date     C APPENDECTOMY  9/09     C EXCISION OF LINGUAL TONSIL       C LEG/ANKLE SURGERY PROC UNLISTED  1980    fx ankle, pins and screws placed     CRYOCAUTERY OF CERVIX  1982             Review of Systems   Musculoskeletal: Positive for back pain.   All other systems reviewed and are negative.        Physical Exam  /79   Ht 1.651 m (5' 5\")   Wt 68.5 kg (151 lb)   LMP 04/05/2010   BMI 25.13 kg/m     Constitutional:well-developed, well-nourished, and in no distress.   Cardiovascular: Intact distal pulses.    Neurological: alert. Gait Normal:   Gait, station, stance, and balance appear normal for age  Skin: Skin is warm and dry.   Psychiatric: Mood and affect normal.   Respiratory: unlabored, speaks in full sentences  Lymph: no LAD, no lymphangitis            Back Exam     Tenderness   The patient is experiencing no tenderness.     Range of Motion   Extension: normal   Flexion: normal   Lateral bend right: normal   Lateral bend left: normal "   Rotation right: normal   Rotation left: normal     Muscle Strength   The patient has normal back strength.    Tests   Straight leg raise right: negative  Straight leg raise left: negative    Other   Toe walk: normal  Heel walk: normal  Sensation: normal  Gait: normal   Erythema: no back redness  Scars: absent    Comments:  Good range of motion, mild pain at end range of extension > flexion.               ASSESSMENT/PLAN    ICD-10-CM    1. Lumbar degenerative disc disease M51.36    2. Lumbar facet arthropathy M47.816       Continued improvement.  Essentially asymptomatic at this point.  She continues to decline further treatment other than physical therapy.  She is certainly capable of performing work within the restrictions given at her last visit, as she was doing before she quit that job.  Additionally, we agreed that she could actually perform more lifting and is not required to be sitting for half of her work time at this point.  These prior and current restrictions were expressly stated in a letter given to the patient indicating that she certainly can perform paid employment.  Follow-up as needed.    Time spent in one-on-one evalution and discussion with an established patient of this clinic.  regarding nature of problem, course, prior treatments, and therapeutic options, at least 50% of which was spent in counseling and coordination of care: 25 minutes, particularly in reviewing course and current symptoms as well as detailed elaboration of prior work restrictions and current capacities, and writing a letter with the patient's assistance documenting this.    Again, thank you for allowing me to participate in the care of your patient.        Sincerely,        Jordan Ma MD

## 2019-11-14 ENCOUNTER — DOCUMENTATION ONLY (OUTPATIENT)
Dept: FAMILY MEDICINE | Facility: CLINIC | Age: 59
End: 2019-11-14

## 2019-11-15 ENCOUNTER — OFFICE VISIT (OUTPATIENT)
Dept: FAMILY MEDICINE | Facility: CLINIC | Age: 59
End: 2019-11-15
Payer: COMMERCIAL

## 2019-11-15 VITALS
TEMPERATURE: 97.7 F | OXYGEN SATURATION: 97 % | SYSTOLIC BLOOD PRESSURE: 124 MMHG | HEIGHT: 65 IN | RESPIRATION RATE: 16 BRPM | DIASTOLIC BLOOD PRESSURE: 82 MMHG | BODY MASS INDEX: 26.03 KG/M2 | WEIGHT: 156.25 LBS | HEART RATE: 68 BPM

## 2019-11-15 DIAGNOSIS — M54.16 RIGHT LUMBAR RADICULITIS: Primary | ICD-10-CM

## 2019-11-15 PROCEDURE — 99213 OFFICE O/P EST LOW 20 MIN: CPT | Mod: 25 | Performed by: FAMILY MEDICINE

## 2019-11-15 PROCEDURE — 90471 IMMUNIZATION ADMIN: CPT | Performed by: FAMILY MEDICINE

## 2019-11-15 PROCEDURE — 90750 HZV VACC RECOMBINANT IM: CPT | Performed by: FAMILY MEDICINE

## 2019-11-15 ASSESSMENT — MIFFLIN-ST. JEOR: SCORE: 1289.63

## 2019-11-15 NOTE — PROGRESS NOTES
"Subjective     Ruthann Beck is a 59 year old female who presents to clinic today for the following health issues:    HPI   Pt would like form for unemployment filled out stating pt saw provider when she hurt back. 08/16/2019    Not going to lift over 30 lbs.   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   Going to see sports med Monday for follow up.      Social History     Occupational History     Occupation: baker     Employer: [employer]     Comment: baker [employer]   Tobacco Use     Smoking status: Never Smoker     Smokeless tobacco: Never Used   Substance and Sexual Activity     Alcohol use: Yes     Comment: 1 beer or glass of wine a day     Drug use: No     Sexual activity: Yes     Partners: Male     Allergies   Allergen Reactions     Bee Venom      swelling     No Known Drug Allergies      Patient Active Problem List   Diagnosis     Lumbar radiculopathy     CARDIOVASCULAR SCREENING; LDL GOAL LESS THAN 160     Common wart     Hip pain     Left knee pain     Right knee pain     Left shoulder pain     Lumbar facet arthropathy     Lumbar degenerative disc disease     Chronic right-sided low back pain without sciatica     Reviewed medications, social history and  past medical and surgical history.    Review of system: for general, respiratory, CVS, GI and psychiatry negative except for noted above.     EXAM:  /82 (BP Location: Left arm, Patient Position: Sitting, Cuff Size: Adult Regular)   Pulse 68   Temp 97.7  F (36.5  C) (Oral)   Resp 16   Ht 1.651 m (5' 5\")   Wt 70.9 kg (156 lb 4 oz)   LMP 04/05/2010   SpO2 97%   BMI 26.00 kg/m    Constitutional: healthy, alert and no distress   Psychiatric: mentation appears normal and affect normal/bright       ASSESSMENT / PLAN:  (M54.16) Right lumbar radiculitis  (primary encounter diagnosis)  Comment: todays visit was to get forms to be filled out for the days she was given no return to work from 8/16/19 to 9/2/19. Filled out form. She " does not need any other intervention today. Going to see sports med on Monday. Does not need [employer] insurance forms to be filled out today.  Plan:          The above note was dictated using voice recognition. Although reviewed after completion, some word and grammatical error may remain .

## 2019-11-15 NOTE — NURSING NOTE
Prior to immunization administration, verified patients identity using patient s name and date of birth. Please see Immunization Activity for additional information.     Screening Questionnaire for Adult Immunization    Are you sick today?   No   Do you have allergies to medications, food, a vaccine component or latex?   No   Have you ever had a serious reaction after receiving a vaccination?   No   Do you have a long-term health problem with heart disease, lung disease, asthma, kidney disease, metabolic disease (e.g. diabetes), anemia, or other blood disorder?   No   Do you have cancer, leukemia, HIV/AIDS, or any other immune system problem?   No   In the past 3 months, have you taken medications that affect  your immune system, such as prednisone, other steroids, or anticancer drugs; drugs for the treatment of rheumatoid arthritis, Crohn s disease, or psoriasis; or have you had radiation treatments?   No   Have you had a seizure, or a brain or other nervous system problem?   No   During the past year, have you received a transfusion of blood or blood     products, or been given immune (gamma) globulin or antiviral drug?   No   For women: Are you pregnant or is there a chance you could become        pregnant during the next month?   No   Have you received any vaccinations in the past 4 weeks?   No     Immunization questionnaire answers were all negative.        Per orders of Dr. Spain, injection of shingrix given by Jacqueline Lyles. Patient instructed to remain in clinic for 15 minutes afterwards, and to report any adverse reaction to me immediately.       Screening performed by Jacqueline Lyles on 11/15/2019 at 11:59 AM.

## 2019-11-15 NOTE — PROGRESS NOTES
She does not need me to fill out [employer] insurance paper work. See my notes from 11/15/19.  She has other forms those were filled out and given back to patient. See my notes.

## 2019-11-18 ENCOUNTER — OFFICE VISIT (OUTPATIENT)
Dept: ORTHOPEDICS | Facility: CLINIC | Age: 59
End: 2019-11-18
Payer: COMMERCIAL

## 2019-11-18 VITALS
BODY MASS INDEX: 25.99 KG/M2 | SYSTOLIC BLOOD PRESSURE: 139 MMHG | WEIGHT: 156 LBS | HEIGHT: 65 IN | DIASTOLIC BLOOD PRESSURE: 85 MMHG

## 2019-11-18 DIAGNOSIS — M51.369 LUMBAR DEGENERATIVE DISC DISEASE: Primary | ICD-10-CM

## 2019-11-18 DIAGNOSIS — M47.816 LUMBAR FACET ARTHROPATHY: ICD-10-CM

## 2019-11-18 PROCEDURE — 99213 OFFICE O/P EST LOW 20 MIN: CPT | Performed by: FAMILY MEDICINE

## 2019-11-18 ASSESSMENT — MIFFLIN-ST. JEOR: SCORE: 1288.49

## 2019-11-18 ASSESSMENT — ENCOUNTER SYMPTOMS: BACK PAIN: 1

## 2019-11-18 NOTE — LETTER
11/18/2019         RE: Ruthann Beck  [address]  [facility] [address]        Dear Colleague,    Thank you for referring your patient, Ruthann Beck, to the  SP[facility]TS [facility]. Please see a copy of my visit note below.    [facility]   Follow-up Visit s Nov 18, 2019    PCP: Glory Bass Y      Subjective:  Ruthann is a 59 year old female who is seen in follow up for evaluation of   Chief Complaint   Patient presents with     Lower Back - RECHECK     Her last visit was on 11/6/2019.  Since that time, symptoms have been unchanged. Ruthann Beck is accompanied today by self.     Patient has noticed a stable course of symptoms with home exercise program and activity avoidance  treatment.    Pain is located low back, persistent.  Patient is using no aids.    Patient denies any new injuries.    Patient's past medical, surgical, social and family histories are reviewed today.    History from previous visit on 11/6/2019  Her last visit was on 10/2/2019.  Since that time, symptoms have been unchanged. Ruthann Beck is accompanied today by self.       Patient has noticed a stable course of symptoms with physical therapy treatment. Saw PHYSICAL THERAPY last week, has home program, no longer anticipates need to go to scheduled therapy.     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    Doing well. Continues PHYSICAL THERAPY. Has returned to swimming.     Trying to obtain retraining through state unemployment agency.  Has letter stating that she is unable to perform any paid employment.    Pain is located right low back, persistent.  Patient is using no aids.    Patient denies any new injuries.    Patient's past medical, surgical, social and family histories are reviewed today.        Past Medical History:   Diagnosis Date     NO ACTIVE PROBLEMS        Patient Active Problem List    Diagnosis Date Noted     Chronic right-sided low back pain  without sciatica 10/02/2019     Priority: Medium     Lumbar facet arthropathy 2019     Priority: Medium     Lumbar degenerative disc disease 2019     Priority: Medium     Left shoulder pain 2017     Priority: Medium     Left knee pain 2016     Priority: Medium     Right knee pain 2016     Priority: Medium     Hip pain 10/05/2011     Priority: Medium     Common wart 2011     Priority: Medium     right thumb web       CARDIOVASCULAR SCREENING; LDL GOAL LESS THAN 160 2010     Priority: Medium     Lumbar radiculopathy 2008     Priority: Medium       Family History   Problem Relation Age of Onset     Depression Mother         manic depressive     Hypertension Father      Cardiovascular Father         stroke  at age 71     Arthritis Father         gout     Diabetes Maternal Grandmother      Breast Cancer Paternal Grandmother      Family History Negative Sister      Family History Negative Sister      Family History Negative Sister      Family History Negative Brother      Family History Negative Brother        Social History     Socioeconomic History     Marital status:      Spouse name: Alon     Number of children: 2     Years of education: Not on file     Highest education level: Not on file   Occupational History     Occupation: baker     Employer: [employer]     Comment: b[employer]   Social Needs     Financial resource strain: Not on file     Food insecurity:     Worry: Not on file     Inability: Not on file     Transportation needs:     Medical: Not on file     Non-medical: Not on file   Tobacco Use     Smoking status: Never Smoker     Smokeless tobacco: Never Used   Substance and Sexual Activity     Alcohol use: Yes     Comment: 1 beer or glass of wine a day     Drug use: No       Past Surgical History:   Procedure Laterality Date     C APPENDECTOMY       C EXCISION OF LINGUAL TONSIL       C LEG/ANKLE SURGERY PROC UNLISTED      fx ankle,  pins and screws placed     CRYOCAUTERY OF CERVIX  1982             Review of Systems   Musculoskeletal: Positive for back pain.   All other systems reviewed and are negative.        Physical Exam  LMP 04/05/2010   Constitutional:well-developed, well-nourished, and in no distress.   Cardiovascular: Intact distal pulses.    Neurological: alert. Gait Normal:   Gait, station, stance, and balance appear normal for age  Skin: Skin is warm and dry.   Psychiatric: Mood and affect normal.   Respiratory: unlabored, speaks in full sentences  Lymph: no LAD, no lymphangitis              Lumbar exam deferred as she reports condition is unchanged and she had nearly normal exam at last visit.      ASSESSMENT/PLAN    ICD-10-CM    1. Lumbar degenerative disc disease M51.36    2. Lumbar facet arthropathy M47.816       Stable lumbar spine, doing well following course of physical therapy.  She is in the midst of trying to attempt to obtain a job retraining and unemployment insurance.  She is very worried about being able to lift 50 pounds again which limits the number of jobs available to her as a baker.  However, in my experience, this is not a prohibitive goal as this sort of spinal condition often can be managed to the point where 50 pound lifting is not unreasonable, with good spine strengthening and appropriate precautions.  Forms completed indicating this perspective.    Time spent in one-on-one evalution and discussion with an established patient of this clinic.  regarding nature of problem, course, prior treatments, and therapeutic options, at least 50% of which was spent in counseling and coordination of care: 15 20 minutes.    Again, thank you for allowing me to participate in the care of your patient.        Sincerely,        Jordan Ma MD

## 2019-11-18 NOTE — PROGRESS NOTES
[facility]   Follow-up Visit s Nov 18, 2019    PCP: Glory Bass Y      Subjective:  Ruthann is a 59 year old female who is seen in follow up for evaluation of   Chief Complaint   Patient presents with     Lower Back - RECHECK     Her last visit was on 11/6/2019.  Since that time, symptoms have been unchanged. Ruthann Beck is accompanied today by self.     Patient has noticed a stable course of symptoms with home exercise program and activity avoidance  treatment.    Pain is located low back, persistent.  Patient is using no aids.    Patient denies any new injuries.    Patient's past medical, surgical, social and family histories are reviewed today.    History from previous visit on 11/6/2019  Her last visit was on 10/2/2019.  Since that time, symptoms have been unchanged. Ruthann Beck is accompanied today by self.       Patient has noticed a stable course of symptoms with physical therapy treatment. Saw PHYSICAL THERAPY last week, has home program, no longer anticipates need to go to scheduled therapy.     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    Doing well. Continues PHYSICAL THERAPY. Has returned to swimming.     Trying to obtain retraining through state unemployment agency.  Has letter stating that she is unable to perform any paid employment.    Pain is located right low back, persistent.  Patient is using no aids.    Patient denies any new injuries.    Patient's past medical, surgical, social and family histories are reviewed today.        Past Medical History:   Diagnosis Date     NO ACTIVE PROBLEMS        Patient Active Problem List    Diagnosis Date Noted     Chronic right-sided low back pain without sciatica 10/02/2019     Priority: Medium     Lumbar facet arthropathy 09/04/2019     Priority: Medium     Lumbar degenerative disc disease 09/04/2019     Priority: Medium     Left shoulder pain 08/22/2017     Priority: Medium     Left knee pain  2016     Priority: Medium     Right knee pain 2016     Priority: Medium     Hip pain 10/05/2011     Priority: Medium     Common wart 2011     Priority: Medium     right thumb web       CARDIOVASCULAR SCREENING; LDL GOAL LESS THAN 160 2010     Priority: Medium     Lumbar radiculopathy 2008     Priority: Medium       Family History   Problem Relation Age of Onset     Depression Mother         manic depressive     Hypertension Father      Cardiovascular Father         stroke  at age 71     Arthritis Father         gout     Diabetes Maternal Grandmother      Breast Cancer Paternal Grandmother      Family History Negative Sister      Family History Negative Sister      Family History Negative Sister      Family History Negative Brother      Family History Negative Brother        Social History     Socioeconomic History     Marital status:      Spouse name: Alon     Number of children: 2     Years of education: Not on file     Highest education level: Not on file   Occupational History     Occupation: baker     Employer: [employer]     Comment: [employer]   Social Needs     Financial resource strain: Not on file     Food insecurity:     Worry: Not on file     Inability: Not on file     Transportation needs:     Medical: Not on file     Non-medical: Not on file   Tobacco Use     Smoking status: Never Smoker     Smokeless tobacco: Never Used   Substance and Sexual Activity     Alcohol use: Yes     Comment: 1 beer or glass of wine a day     Drug use: No       Past Surgical History:   Procedure Laterality Date     C APPENDECTOMY       C EXCISION OF LINGUAL TONSIL       C LEG/ANKLE SURGERY PROC UNLISTED      fx ankle, pins and screws placed     CRYOCAUTERY OF CERVIX               Review of Systems   Musculoskeletal: Positive for back pain.   All other systems reviewed and are negative.        Physical Exam  LMP 2010   Constitutional:well-developed,  well-nourished, and in no distress.   Cardiovascular: Intact distal pulses.    Neurological: alert. Gait Normal:   Gait, station, stance, and balance appear normal for age  Skin: Skin is warm and dry.   Psychiatric: Mood and affect normal.   Respiratory: unlabored, speaks in full sentences  Lymph: no LAD, no lymphangitis              Lumbar exam deferred as she reports condition is unchanged and she had nearly normal exam at last visit.      ASSESSMENT/PLAN    ICD-10-CM    1. Lumbar degenerative disc disease M51.36    2. Lumbar facet arthropathy M47.816       Stable lumbar spine, doing well following course of physical therapy.  She is in the midst of trying to attempt to obtain a job retraining and unemployment insurance.  She is very worried about being able to lift 50 pounds again which limits the number of jobs available to her as a baker.  However, in my experience, this is not a prohibitive goal as this sort of spinal condition often can be managed to the point where 50 pound lifting is not unreasonable, with good spine strengthening and appropriate precautions.  Forms completed indicating this perspective.    Time spent in one-on-one evalution and discussion with an established patient of this clinic.  regarding nature of problem, course, prior treatments, and therapeutic options, at least 50% of which was spent in counseling and coordination of care: 15 20 minutes.

## 2019-11-19 ENCOUNTER — TELEPHONE (OUTPATIENT)
Dept: PHYSICAL THERAPY | Facility: CLINIC | Age: 59
End: 2019-11-19

## 2019-11-19 NOTE — TELEPHONE ENCOUNTER
I called the patient after being contacted by patient advocate that patient had questions about returning to work and lifting 50lbs of flour.  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  Patient  was frustrated with this recommendation and stated she doesn t understand the recommendation nor knows what to do.  She ended the call afterwards.  I m not sure what the patient is looking for or how else I can help with her situation.  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

## 2020-01-09 PROBLEM — M25.512 LEFT SHOULDER PAIN: Status: RESOLVED | Noted: 2017-08-22 | Resolved: 2020-01-09

## 2020-01-09 NOTE — PROGRESS NOTES
DISCHARGE SUMMARY    Ruthann Beck was seen 5 times for evaluation and treatment.  Patient did not return for further treatment and current status is unknown.  Due to short treatment duration, no objective or functional changes were made.  Please see goal flow sheet from episode noted date below and initial evaluation for further information.  Patient is discharged from therapy and therapy episode is resolved as of 01/09/20.      No linked episodes

## 2020-02-25 ENCOUNTER — OFFICE VISIT (OUTPATIENT)
Dept: FAMILY MEDICINE | Facility: CLINIC | Age: 60
End: 2020-02-25
Payer: COMMERCIAL

## 2020-02-25 VITALS
DIASTOLIC BLOOD PRESSURE: 68 MMHG | BODY MASS INDEX: 26.79 KG/M2 | SYSTOLIC BLOOD PRESSURE: 126 MMHG | RESPIRATION RATE: 16 BRPM | WEIGHT: 161 LBS | OXYGEN SATURATION: 98 % | TEMPERATURE: 98.1 F | HEART RATE: 74 BPM

## 2020-02-25 DIAGNOSIS — Z13.1 SCREENING FOR DIABETES MELLITUS: ICD-10-CM

## 2020-02-25 DIAGNOSIS — F43.21 ADJUSTMENT DISORDER WITH DEPRESSED MOOD: ICD-10-CM

## 2020-02-25 DIAGNOSIS — G47.00 INSOMNIA, UNSPECIFIED TYPE: Primary | ICD-10-CM

## 2020-02-25 DIAGNOSIS — Z11.59 NEED FOR HEPATITIS C SCREENING TEST: ICD-10-CM

## 2020-02-25 DIAGNOSIS — Z12.11 COLON CANCER SCREENING: ICD-10-CM

## 2020-02-25 DIAGNOSIS — Z13.220 LIPID SCREENING: ICD-10-CM

## 2020-02-25 DIAGNOSIS — Z53.20 HIV SCREENING DECLINED: ICD-10-CM

## 2020-02-25 PROCEDURE — 99214 OFFICE O/P EST MOD 30 MIN: CPT | Performed by: FAMILY MEDICINE

## 2020-02-25 RX ORDER — TRAZODONE HYDROCHLORIDE 50 MG/1
50-100 TABLET, FILM COATED ORAL AT BEDTIME
Qty: 180 TABLET | Refills: 0 | Status: SHIPPED | OUTPATIENT
Start: 2020-02-25 | End: 2020-05-20 | Stop reason: ALTCHOICE

## 2020-02-25 NOTE — PROGRESS NOTES
Subjective     Ruthann Beck is a 59 year old female who presents to clinic today for the following health issues:    HPI   Insomnia      Duration: 2 years, worse over last few months    Description (location/character/radiation): waking up around 2 am, unable to fall asleep for around 3 hours. Typically this is only a few nights a week, lately every night    Intensity:  severe    Accompanying signs and symptoms: frequent crying    History (similar episodes/previous evaluation): None    Precipitating or alleviating factors: None    Therapies tried and outcome: None     She has one night of good sleep over the last two months.   She is able to fall asleep but not stay asleep. She'll wake up one time or multiple times at night.   If she has been up for one hour, then she'll get up to read.   She sleeps from 10 pm to 2 am and again 4-7 am. But that can also change.   On Oct 2019, she quit her job due to her back pain. She hasn't yet found a job and they are broke. She quit alcohol and is exercising. This is a new stressor. She can easily cry.  is supportive. No SI.     Reviewed and updated as needed this visit by Provider         Review of Systems   ROS COMP: Constitutional, HEENT, cardiovascular, pulmonary, gi and gu systems are negative, except as otherwise noted.      Objective    LMP 04/05/2010   There is no height or weight on file to calculate BMI.  Physical Exam   /68 (BP Location: Right arm, Patient Position: Chair, Cuff Size: Adult Regular)   Pulse 74   Temp 98.1  F (36.7  C) (Oral)   Resp 16   Wt 73 kg (161 lb)   LMP 04/05/2010   SpO2 98%   BMI 26.79 kg/m    GENERAL: healthy, alert and no distress  EYES: Eyes grossly normal to inspection, PERRL and conjunctivae and sclerae normal  HENT: ear canals and TM's normal, nose and mouth without ulcers or lesions  PSYCH: mentation appears normal, affect normal/bright    Diagnostic Test Results:  Labs reviewed in Epic        Assessment &  Plan     Insomnia, adjustment disorder   - offered pt counseling but she declined  - discussed different medication options   - discussed that trazodone would likely help with insomnia   - discussed s/e of medication   - traZODone (DESYREL) 50 MG tablet; Take 1-2 tablets ( mg) by mouth At Bedtime  Dispense: 180 tablet; Refill: 0  - Follow if symptoms worsen or fail to improve.    Colon cancer screening  - GASTROENTEROLOGY ADULT REF PROCEDURE ONLY    Lipid screening  - Lipid Profile (Chol, Trig, HDL, LDL calc); Future    Screening for diabetes mellitus  - Glucose; Future    Need for hepatitis C screening test  - Hepatitis C Screen Reflex to HCV RNA Quant and Genotype; Future    HIV screening declined        Return in about 1 week (around 3/3/2020) for sympotms are not improving.    Glory Bass MD  [facility] [place]

## 2020-02-25 NOTE — PATIENT INSTRUCTIONS
- traZODone (DESYREL) 50 MG tablet; Take 1-2 tablets ( mg) by mouth At Bedtime        Colonoscopy -   [place] [phone]  [facility]  [phone]

## 2020-02-26 ENCOUNTER — HOSPITAL ENCOUNTER (OUTPATIENT)
Facility: AMBULATORY SURGERY CENTER | Age: 60
End: 2020-02-26
Attending: INTERNAL MEDICINE
Payer: COMMERCIAL

## 2020-02-27 DIAGNOSIS — Z11.59 NEED FOR HEPATITIS C SCREENING TEST: ICD-10-CM

## 2020-02-27 DIAGNOSIS — Z13.220 LIPID SCREENING: ICD-10-CM

## 2020-02-27 DIAGNOSIS — Z13.1 SCREENING FOR DIABETES MELLITUS: ICD-10-CM

## 2020-02-27 LAB
CHOLEST SERPL-MCNC: 225 MG/DL
GLUCOSE SERPL-MCNC: 88 MG/DL (ref 70–99)
HCV AB SERPL QL IA: NONREACTIVE
HDLC SERPL-MCNC: 69 MG/DL
LDLC SERPL CALC-MCNC: 139 MG/DL
NONHDLC SERPL-MCNC: 156 MG/DL
TRIGL SERPL-MCNC: 86 MG/DL

## 2020-02-27 PROCEDURE — 80061 LIPID PANEL: CPT | Performed by: FAMILY MEDICINE

## 2020-02-27 PROCEDURE — 86803 HEPATITIS C AB TEST: CPT | Performed by: FAMILY MEDICINE

## 2020-02-27 PROCEDURE — 36415 COLL VENOUS BLD VENIPUNCTURE: CPT | Performed by: FAMILY MEDICINE

## 2020-02-27 PROCEDURE — 82947 ASSAY GLUCOSE BLOOD QUANT: CPT | Performed by: FAMILY MEDICINE

## 2020-04-06 ENCOUNTER — VIRTUAL VISIT (OUTPATIENT)
Dept: FAMILY MEDICINE | Facility: CLINIC | Age: 60
End: 2020-04-06
Payer: COMMERCIAL

## 2020-04-06 VITALS — HEIGHT: 65 IN | BODY MASS INDEX: 26.66 KG/M2 | WEIGHT: 160 LBS

## 2020-04-06 DIAGNOSIS — G47.00 INSOMNIA, UNSPECIFIED TYPE: Primary | ICD-10-CM

## 2020-04-06 PROCEDURE — 99213 OFFICE O/P EST LOW 20 MIN: CPT | Mod: TEL | Performed by: FAMILY MEDICINE

## 2020-04-06 ASSESSMENT — MIFFLIN-ST. JEOR: SCORE: 1306.64

## 2020-04-06 NOTE — PROGRESS NOTES
"Subjective     Ruthann Beck is a 59 year old female who is being evaluated via a billable telephone visit.      The patient has been notified of following:     \"This telephone visit will be conducted via a call between you and your physician/provider. We have found that certain health care needs can be provided without the need for a physical exam.  This service lets us provide the care you need with a short phone conversation.  If a prescription is necessary we can send it directly to your pharmacy.  If lab work is needed we can place an order for that and you can then stop by our lab to have the test done at a later time.    If during the course of the call the physician/provider feels a telephone visit is not appropriate, you will not be charged for this service.\"     Patient has given verbal consent for Telephone visit?  Yes    Ruthann Beck complains of   Chief Complaint   Patient presents with     Medication Problem       ALLERGIES  Bee venom and No known drug allergies      Over the last month she has taken Trazodone 100 mg at night. She might sleep most of the night one night/week. Last night she woke up three times. No change in symptoms with COVID19 pandemic. She still does not feel well rested.       Assessment/Plan:    1. Insomnia, unspecified type  - discussed medication options   - advised to stop trazodone and start elavil  - amitriptyline (ELAVIL) 25 MG tablet; Take 1-2 tablets (25-50 mg) by mouth nightly as needed for sleep  Dispense: 60 tablet; Refill: 1  - pt will call/message with update if symptoms are not improving in 1 week       Phone call duration:  5 minutes    Glory Bass MD      "

## 2020-05-20 DIAGNOSIS — G47.00 INSOMNIA, UNSPECIFIED TYPE: ICD-10-CM

## 2020-05-20 RX ORDER — TRAZODONE HYDROCHLORIDE 50 MG/1
TABLET, FILM COATED ORAL
Qty: 180 TABLET | Refills: 0 | OUTPATIENT
Start: 2020-05-20

## 2020-05-20 NOTE — TELEPHONE ENCOUNTER
Denied-  Med list updated.  Margarita Mathis RN    1. Insomnia, unspecified type  - discussed medication options   - advised to stop trazodone and start elavil  - amitriptyline (ELAVIL) 25 MG tablet; Take 1-2 tablets (25-50 mg) by mouth nightly as needed for sleep  Dispense: 60 tablet; Refill: 1  - pt will call/message with update if symptoms are not improving in 1 week         Phone call duration:  5 minutes     Glory Bass MD

## 2020-06-03 ENCOUNTER — VIRTUAL VISIT (OUTPATIENT)
Dept: FAMILY MEDICINE | Facility: OTHER | Age: 60
End: 2020-06-03

## 2020-06-03 NOTE — PROGRESS NOTES
"Date: 2020 13:43:24  Clinician: Carina Rose  Clinician NPI: [id number]  Patient: Ruthann Beck  Patient : 1960  Patient Address: [address] [place] [id number]  Patient Phone: [phone]  Visit Protocol: URI  Patient Summary:  Ruthann is a 60 year old ( : 1960 ) female who initiated a Visit for COVID-19 (Coronavirus) evaluation and screening. When asked the question \"Please sign me up to receive news, health information and promotions from [facility].\", Ruthann responded \"No\".    Her symptoms consist of a sore throat.   Symptom details   Sore throat: Ruthann reports having mild throat pain (1-3 on a 10 point pain scale), does not have exudate on her tonsils, and can swallow liquids. The lymph nodes in her neck are not enlarged. A rash has not appeared on the skin since the sore throat started.    Ruthann denies having wheezing, nausea, teeth pain, ageusia, diarrhea, enlarged lymph nodes, malaise, myalgias, anosmia, facial pain or pressure, fever, cough, nasal congestion, vomiting, rhinitis, ear pain, headache, and chills. She also denies having recent facial or sinus surgery in the past 60 days and taking antibiotic medication for the symptoms. She is not experiencing dyspnea.   Precipitating events  Ruthann is not sure if she has been exposed to someone with strep throat.   Pertinent COVID-19 (Coronavirus) information  In the past 14 days, Ruthann has not worked in a congregate living setting.   She does not work or volunteer as healthcare worker or a  and does not work or volunteer in a healthcare facility.   Ruthann also has not lived in a congregate living setting in the past 14 days. She does not live with a healthcare worker.   Ruthann has not had a close contact with a laboratory-confirmed COVID-19 patient within 14 days of symptom onset.   Pertinent medical history  Ruthann does not get yeast infections when she takes antibiotics.   Ruthann " does not need a return to work/school note.   Weight: 160 lbs   Ruthann does not smoke or use smokeless tobacco.   Weight: 160 lbs    MEDICATIONS: estradiol-norethindrone transdermal, progesterone micronized oral, ALLERGIES: NKDA  Clinician Response:  Dear Ruthann,   Based on the details you've shared, you may have coronavirus (COVID-19).This&nbsp; can cause a sore throat but without fever, cough, trouble breathing, headache, body aches, chills, loss of taste or smell, chest pain or diarrhea, you do not need to be tested. Monitor for these symptoms. If you develop any of these, complete another [facility] visit for further evaluation.  While you do not qualify for testing via [facility], there are other locations that are offering testing for COVID-19. Please visit https://[place].gov/covid19/for[place]/if-sick/testing-locations/index.jsp&nbsp;to find a location near you if you are interested in being tested.  What should I do?  Starting now:  Stay at least 6 feet away from others. (If someone will drive you to your test, stay in the backseat, as far away from the  as you can.)   Don't go to work, school or anywhere else. When it's time for your test, go straight to the testing site. Don't make any stops on the way there or back.   Wash your hands and face often. Use soap and water.   Cover your mouth and nose with a mask, tissue or washcloth.   Don't touch anyone. No hugging, kissing or handshakes.  While at home   Stay home and away from others (self-isolate) until:  You've had no fever---and no medicine that reduces fever---for 3 full days (72 hours). And...  Your other symptoms have gotten better. For example, your cough or breathing has improved. And...  At least 10 days have passed since your symptoms started.  During this time:  Stay in your own room (and use your own bathroom), if you can.  Don't go to work, school or anywhere else.  Stay away from others in your home. No hugging, kissing or shaking hands.   "Don't let anyone visit.  Cover your mouth and nose with a mask, tissue or washcloth to avoid spreading germs.  Clean \"high touch\" surfaces often (doorknobs, counters, handles, etc.). Use a household cleaning spray or wipes.  Wash your hands and face often. Use soap and water.  How can I take care of myself?  1. Get lots of rest. Drink extra fluids (unless your doctor has told you not to).  2. Take Tylenol (acetaminophen) for fever or pain. If you have liver or kidney problems, ask your family doctor if it's okay to take Tylenol.  Adults can take either:   650 mg (two 325 mg pills) every 4 to 6 hours, or...  1,000 mg (two 500 mg pills) every 8 hours as needed.   Note: Don't take more than 3,000 mg in one day.   Acetaminophen is found in many medicines (both prescribed and over-the-counter medicines). Read all labels to be sure you don't take too much.   For children, check the Tylenol bottle for the right dose. The dose is based on the child's age or weight.  3. If you have other health problems (like cancer, heart failure, an organ transplant or severe kidney disease): Call your specialty clinic if you don't feel better in the next 2 days.  4. Know when to call 911: If your breathing is so bad that it keeps you from doing normal activities, call 911 or go to the emergency room. Tell them that you've been staying home and may have COVID-19.  5. Sign up for [facility]. We know it's scary to hear that you might have COVID-19. We want to track your symptoms to make sure you're okay over the next 2 weeks. Please look for an email from [facility]---this is a free, online program that we'll use to keep in touch. To sign up, follow the link in the email. Learn more at [email or web address].  6. The following will serve as your written order for this Covid Test ordered by me for the indication of suspected Covid [Z20.828]: The test will be ordered in [facility], our electronic health record after you are scheduled " and will show as ordered and authorized by Chinedu Self MD   Order: Covid-19 (Coronavirus) PCR for SYMPTOMATIC testing from [facility]  Where can I get more information?  To learn more about COVID-19 and how to care for yourself at home, please visit the CDC website at https://www.cdc.gov/coronavirus/2019-ncov/about/steps-when-sick.html.  For more about your care at [facility], please visit [email or web address]  If you'd like to be part of a COVID-19 clinical trial (research study) at the [facility], go to [email or web address] for details.    Diagnosis: Acute pharyngitis, unspecified  Diagnosis ICD: J02.9

## 2020-06-09 ENCOUNTER — VIRTUAL VISIT (OUTPATIENT)
Dept: FAMILY MEDICINE | Facility: OTHER | Age: 60
End: 2020-06-09

## 2020-06-09 NOTE — PROGRESS NOTES
"Date: 2020 13:09:55  Clinician: Ashley Layton  Clinician NPI: [id number]  Patient: Ruthann Beck  Patient : 1960  Patient Address: [address] [place] [address]  Patient Phone: [phone]  Visit Protocol: General skin conditions  Patient Summary:  Ruthann is a 60 year old ( : 1960 ) female who initiated a Visit for evaluation of an unspecified skin condition. When asked the question \"Please sign me up to receive news, health information and promotions from [employer].\", Ruthann responded \"No\".    Images of her skin condition were uploaded.  Her symptoms started 1-2 weeks ago and affect the right side of her body. The skin condition is located on her arms. The skin condition is black in color.    The skin condition has not changed since the symptoms started.   Denied symptoms include hives, itchiness, warm to touch, drainage, crusts, blisters, burning, tender to touch, scabs, pimples, numbness, dry/flaky skin, sores, and pain. Ruthann does not feel feverish. She does not have a rash in the shape of a bull's-eye.   Ruthann has not tried anything to relieve her symptoms.   Precipitating events   Ruthann did not come in contact with any irritants prior to the onset of her symptoms and has not been in close contact with anyone that has similar symptoms. She also did not spend time in a wooded area, swim, travel, or spend excess time in the sun just before her symptoms started. Ruthann did not get bitten or stung by an insect.   Pertinent medical history  Ruthann has not experienced this skin condition before.   Ruthann has had chickenpox, but has not had shingles in the past. She received a shingles vaccine.    Ruthann does not have a history or a family history of atopia. Ongoing medical conditions were denied.   Ruthann does not need a return to work/school note.   Weight: 160 lbs   Ruthann does not smoke or use smokeless tobacco.   Additional information as reported by " the patient (free text): None of the fields I have answered really apply to my concern which is a new BROWN (not a color from your list so I chose black) 1/16 inch irregular shape spot on my arm.  All I want to know is should I see a dermatologist? thank you.   Weight: 160 lbs    MEDICATIONS: estradiol-norethindrone transdermal, progesterone micronized oral, ALLERGIES: NKDA  Clinician Response:  Dear Ruthann,    I recommend for you contact your primary care provider for evaluation of this skin lesion and monitoring.&nbsp;  If you have a [facility]/online chart connection with your provider, you may be able to upload a picture of this skin lesion to show your primary care provider that way. Otherwise your provider might prefer that you to schedule an appointment in the clinic to complete a skin check in-person.&nbsp;  Take Care!    Diagnosis: Other skin changes  Diagnosis ICD: R23.8

## 2020-07-06 DIAGNOSIS — Z11.59 ENCOUNTER FOR SCREENING FOR OTHER VIRAL DISEASES: Primary | ICD-10-CM

## 2020-07-07 ENCOUNTER — MYC MEDICAL ADVICE (OUTPATIENT)
Dept: GASTROENTEROLOGY | Facility: CLINIC | Age: 60
End: 2020-07-07

## 2020-07-07 DIAGNOSIS — Z12.11 SPECIAL SCREENING FOR MALIGNANT NEOPLASMS, COLON: Primary | ICD-10-CM

## 2020-07-07 RX ORDER — BISACODYL 5 MG/1
10 TABLET, DELAYED RELEASE ORAL DAILY
Qty: 4 TABLET | Refills: 0 | Status: SHIPPED | OUTPATIENT
Start: 2020-07-07 | End: 2020-07-14

## 2020-07-11 DIAGNOSIS — Z11.59 ENCOUNTER FOR SCREENING FOR OTHER VIRAL DISEASES: ICD-10-CM

## 2020-07-11 LAB
SARS-COV-2 PCR COMMENT: NORMAL
SARS-COV-2 RNA SPEC QL NAA+PROBE: NEGATIVE
SARS-COV-2 RNA SPEC QL NAA+PROBE: NORMAL
SPECIMEN SOURCE: NORMAL
SPECIMEN SOURCE: NORMAL

## 2020-07-14 ENCOUNTER — HOSPITAL ENCOUNTER (OUTPATIENT)
Facility: AMBULATORY SURGERY CENTER | Age: 60
End: 2020-07-14
Attending: INTERNAL MEDICINE
Payer: COMMERCIAL

## 2020-07-14 VITALS
DIASTOLIC BLOOD PRESSURE: 57 MMHG | HEIGHT: 65 IN | BODY MASS INDEX: 26.66 KG/M2 | SYSTOLIC BLOOD PRESSURE: 111 MMHG | OXYGEN SATURATION: 99 % | TEMPERATURE: 96.8 F | WEIGHT: 160 LBS | RESPIRATION RATE: 16 BRPM | HEART RATE: 58 BPM

## 2020-07-14 LAB — COLONOSCOPY: NORMAL

## 2020-07-14 RX ORDER — ONDANSETRON 2 MG/ML
4 INJECTION INTRAMUSCULAR; INTRAVENOUS EVERY 6 HOURS PRN
Status: DISCONTINUED | OUTPATIENT
Start: 2020-07-14 | End: 2020-07-15 | Stop reason: HOSPADM

## 2020-07-14 RX ORDER — ONDANSETRON 4 MG/1
4 TABLET, ORALLY DISINTEGRATING ORAL EVERY 6 HOURS PRN
Status: DISCONTINUED | OUTPATIENT
Start: 2020-07-14 | End: 2020-07-15 | Stop reason: HOSPADM

## 2020-07-14 RX ORDER — FLUMAZENIL 0.1 MG/ML
0.2 INJECTION, SOLUTION INTRAVENOUS
Status: ACTIVE | OUTPATIENT
Start: 2020-07-14 | End: 2020-07-14

## 2020-07-14 RX ORDER — LIDOCAINE 40 MG/G
CREAM TOPICAL
Status: DISCONTINUED | OUTPATIENT
Start: 2020-07-14 | End: 2020-07-14 | Stop reason: HOSPADM

## 2020-07-14 RX ORDER — FENTANYL CITRATE 50 UG/ML
INJECTION, SOLUTION INTRAMUSCULAR; INTRAVENOUS PRN
Status: DISCONTINUED | OUTPATIENT
Start: 2020-07-14 | End: 2020-07-14 | Stop reason: HOSPADM

## 2020-07-14 RX ORDER — SIMETHICONE
LIQUID (ML) MISCELLANEOUS PRN
Status: DISCONTINUED | OUTPATIENT
Start: 2020-07-14 | End: 2020-07-14 | Stop reason: HOSPADM

## 2020-07-14 RX ORDER — ONDANSETRON 2 MG/ML
4 INJECTION INTRAMUSCULAR; INTRAVENOUS
Status: DISCONTINUED | OUTPATIENT
Start: 2020-07-14 | End: 2020-07-14 | Stop reason: HOSPADM

## 2020-07-14 RX ORDER — NALOXONE HYDROCHLORIDE 0.4 MG/ML
.1-.4 INJECTION, SOLUTION INTRAMUSCULAR; INTRAVENOUS; SUBCUTANEOUS
Status: DISCONTINUED | OUTPATIENT
Start: 2020-07-14 | End: 2020-07-15 | Stop reason: HOSPADM

## 2020-07-14 ASSESSMENT — MIFFLIN-ST. JEOR: SCORE: 1301.64

## 2020-07-16 LAB — COPATH REPORT: NORMAL

## 2020-10-06 ENCOUNTER — OFFICE VISIT (OUTPATIENT)
Dept: FAMILY MEDICINE | Facility: CLINIC | Age: 60
End: 2020-10-06
Payer: COMMERCIAL

## 2020-10-06 ENCOUNTER — ANCILLARY PROCEDURE (OUTPATIENT)
Dept: GENERAL RADIOLOGY | Facility: CLINIC | Age: 60
End: 2020-10-06
Attending: FAMILY MEDICINE
Payer: COMMERCIAL

## 2020-10-06 VITALS
HEART RATE: 83 BPM | BODY MASS INDEX: 26.56 KG/M2 | TEMPERATURE: 98.4 F | SYSTOLIC BLOOD PRESSURE: 133 MMHG | OXYGEN SATURATION: 97 % | WEIGHT: 159.4 LBS | HEIGHT: 65 IN | DIASTOLIC BLOOD PRESSURE: 86 MMHG

## 2020-10-06 DIAGNOSIS — M79.675 PAIN OF TOE OF LEFT FOOT: ICD-10-CM

## 2020-10-06 DIAGNOSIS — Z23 NEED FOR PROPHYLACTIC VACCINATION AND INOCULATION AGAINST INFLUENZA: ICD-10-CM

## 2020-10-06 PROCEDURE — 90682 RIV4 VACC RECOMBINANT DNA IM: CPT | Performed by: FAMILY MEDICINE

## 2020-10-06 PROCEDURE — 90471 IMMUNIZATION ADMIN: CPT | Performed by: FAMILY MEDICINE

## 2020-10-06 PROCEDURE — 73660 X-RAY EXAM OF TOE(S): CPT | Mod: LT | Performed by: RADIOLOGY

## 2020-10-06 PROCEDURE — 99213 OFFICE O/P EST LOW 20 MIN: CPT | Mod: 25 | Performed by: FAMILY MEDICINE

## 2020-10-06 ASSESSMENT — MIFFLIN-ST. JEOR: SCORE: 1302.87

## 2020-10-06 NOTE — PROGRESS NOTES
"Subjective     Ruthann Beck is a 60 year old female who presents to clinic today for the following health issues:    HPI         Musculoskeletal problem/pain  Onset/Duration: 4 months   She notes that 1st metarsals hurts. Pain is intermittent and yesterday was normal while wearing hiking shoes. Then on the drive back to [place] she was having pain and took Ibuprofen 400 mg. No recent injury. Pain not correlated with overuse.   Description  Location: big toe - left  Joint Swelling: no  Redness: no  Pain: YES  Warmth: no  Intensity:  moderate, severe  Progression of Symptoms:  same and intermittent  Accompanying signs and symptoms:   Fevers: no  Numbness/tingling/weakness: no  History  Trauma to the area: no  Recent illness:  no  Previous similar problem: no  Previous evaluation:  no  Precipitating or alleviating factors:  Aggravating factors include: walking  Therapies tried and outcome: nothing and Ibuprofen        Review of Systems   Constitutional, HEENT, cardiovascular, pulmonary, gi and gu systems are negative, except as otherwise noted.      Objective    LMP 04/05/2010   There is no height or weight on file to calculate BMI.  Physical Exam   /86   Pulse 83   Temp 98.4  F (36.9  C) (Oral)   Ht 1.657 m (5' 5.25\")   Wt 72.3 kg (159 lb 6.4 oz)   LMP 04/05/2010   SpO2 97%   BMI 26.32 kg/m    GENERAL: healthy, alert and no distress  EYES: Eyes grossly normal to inspection  HENT:  nose and mouth without ulcers or lesions  MS: no gross musculoskeletal defects noted, no edema, + tenderness over left metatarsal joint  SKIN: no suspicious lesions or rashes    No results found for this or any previous visit (from the past 24 hour(s)).        Assessment & Plan       1. Pain of toe of left foot  - XR Toe Left G/E 2 Views  - advised to use OTC voltaren as needed   - Orthopedic & Spine  Referral; Future    2. Need for prophylactic vaccination and inoculation against influenza  - INFLUENZA " QUAD, RECOMBINANT, P-FREE (RIV4) (FLUBLOCK) [61180]      Glory Bass MD  [facility]

## 2020-11-02 ENCOUNTER — OFFICE VISIT (OUTPATIENT)
Dept: PODIATRY | Facility: CLINIC | Age: 60
End: 2020-11-02
Payer: COMMERCIAL

## 2020-11-02 ENCOUNTER — ANCILLARY PROCEDURE (OUTPATIENT)
Dept: GENERAL RADIOLOGY | Facility: CLINIC | Age: 60
End: 2020-11-02
Attending: PODIATRIST
Payer: COMMERCIAL

## 2020-11-02 VITALS
WEIGHT: 150 LBS | HEIGHT: 65 IN | SYSTOLIC BLOOD PRESSURE: 116 MMHG | BODY MASS INDEX: 24.99 KG/M2 | DIASTOLIC BLOOD PRESSURE: 72 MMHG

## 2020-11-02 DIAGNOSIS — M20.5X2 HALLUX LIMITUS OF LEFT FOOT: Primary | ICD-10-CM

## 2020-11-02 DIAGNOSIS — M79.675 PAIN OF TOE OF LEFT FOOT: ICD-10-CM

## 2020-11-02 DIAGNOSIS — M20.5X2 HALLUX LIMITUS OF LEFT FOOT: ICD-10-CM

## 2020-11-02 PROCEDURE — 99203 OFFICE O/P NEW LOW 30 MIN: CPT | Performed by: PODIATRIST

## 2020-11-02 PROCEDURE — 73630 X-RAY EXAM OF FOOT: CPT | Mod: LT | Performed by: RADIOLOGY

## 2020-11-02 ASSESSMENT — MIFFLIN-ST. JEOR: SCORE: 1256.28

## 2020-11-02 NOTE — PROGRESS NOTES
PATIENT HISTORY:  Ruthann Beck is a 60 year old female who presents to clinic for L 1st MPJ pain.  5 month duration.  She has tried stiff shoes, rest, NSAIDs, topical arthritis cream.  Not helping.  Pain is worse with activity.  Pt reports stopping her hormone therapy 3 wks ago.    I was requested to see this patient for this issue by Dr Holder.    Review of Systems:  Patient denies fever, chills, rash, wound, limping, numbness, weakness, heart burn, blood in stool, chest pain with activity, calf pain when walking, shortness of breath with activity, chronic cough, easy bleeding/bruising, swelling of ankles, excessive thirst, fatigue, depression, anxiety.  Patient admits to stiffness.     PAST MEDICAL HISTORY:   Past Medical History:   Diagnosis Date     NO ACTIVE PROBLEMS         PAST SURGICAL HISTORY:   Past Surgical History:   Procedure Laterality Date     C APPENDECTOMY  9/09     C LEG/ANKLE SURGERY PROC UNLISTED  1980    fx ankle, pins and screws placed     COLONOSCOPY N/A 7/14/2020    Procedure: COLONOSCOPY;  Surgeon: Kaylin Gibson MD;  Location: [facility] OR     COLONOSCOPY N/A 7/14/2020    Procedure: Colonoscopy, With Polypectomy And Biopsy;  Surgeon: Kaylin Gibson MD;  Location: [facility] OR     CRYOCAUTERY OF CERVIX  1982     HC EXCISION OF LINGUAL TONSIL          MEDICATIONS:   Current Outpatient Medications:      estradiol (VIVELLE-DOT) 0.05 MG/24HR patch, Place 1 patch onto the skin twice a week, Disp: , Rfl:      Progesterone Micronized (PROMETRIUM PO), Take  by mouth daily. 1 tablet daily, Disp: , Rfl:      ALLERGIES:    Allergies   Allergen Reactions     Bee Venom      swelling     No Known Drug Allergies         SOCIAL HISTORY:   Social History     Socioeconomic History     Marital status:      Spouse name: Alon     Number of children: 2     Years of education: Not on file     Highest education level: Not on file   Occupational History     Occupation: baker     Employer: [employer]      Comment: baker [employer]   Social Needs     Financial resource strain: Not on file     Food insecurity     Worry: Not on file     Inability: Not on file     Transportation needs     Medical: Not on file     Non-medical: Not on file   Tobacco Use     Smoking status: Never Smoker     Smokeless tobacco: Never Used   Substance and Sexual Activity     Alcohol use: Yes     Comment: 1 beer or glass of wine a day     Drug use: No     Sexual activity: Yes     Partners: Male   Lifestyle     Physical activity     Days per week: Not on file     Minutes per session: Not on file     Stress: Not on file   Relationships     Social connections     Talks on phone: Not on file     Gets together: Not on file     Attends Hindu service: Not on file     Active member of club or organization: Not on file     Attends meetings of clubs or organizations: Not on file     Relationship status: Not on file     Intimate partner violence     Fear of current or ex partner: Not on file     Emotionally abused: Not on file     Physically abused: Not on file     Forced sexual activity: Not on file   Other Topics Concern      Service No     Blood Transfusions No     Caffeine Concern Yes     Comment: 2 cups of coffee a day     Occupational Exposure No     Hobby Hazards No     Sleep Concern No     Stress Concern No     Weight Concern No     Special Diet No     Back Care No     Exercise Yes     Comment: runs 5 times a week 2 to 3 miles each time     Bike Helmet Yes     Seat Belt Yes     Self-Exams No     Parent/sibling w/ CABG, MI or angioplasty before 65F 55M? No   Social History Narrative    Balanced Diet - Yes    Osteoporosis Prevention Measures - Dairy servings per day: 2 to 3 servings daily    Regular Exercise -  Yes Describe swims twice a week for 1 mile    Dental Exam - YES - Date: 3 months ago    Eye Exam - YES - Date: 1 year ago    Self Breast Exam - No    Abuse: Current or Past (Physical, Sexual or Emotional)- No    Do you feel safe  "in your environment - Yes    Guns stored in the home - No    Sunscreen used - No    Seatbelts used - Yes    Lipids -  NO    Glucose -  YES - Date: 3-05    Colon Cancer Screening - No    Hemoccults - NO    Pap Test -  YES - Date: 1 year ago, does at ob/gyn, jhx of abnormal pap, cryosurgery    Do you have any concerns about STD's -  No    Mammography - YES - Date:     DEXA - NO    Immunizations reviewed and up to date - thinks it's been about 10 years since her last td.    08  YAMILET Acuna CMA        FAMILY HISTORY:   Family History   Problem Relation Age of Onset     Depression Mother         manic depressive     Hypertension Father      Cardiovascular Father         stroke  at age 71     Arthritis Father         gout     Diabetes Maternal Grandmother      Breast Cancer Paternal Grandmother      Family History Negative Sister      Family History Negative Sister      Family History Negative Sister      Family History Negative Brother      Family History Negative Brother         EXAM:Vitals: /72   Ht 1.651 m (5' 5\")   Wt 68 kg (150 lb)   LMP 2010   BMI 24.96 kg/m    BMI= Body mass index is 24.96 kg/m .    General appearance: Patient is alert and fully cooperative with history & exam.  No sign of distress is noted during the visit.     Psychiatric: Affect is pleasant & appropriate.  Patient appears motivated to improve health.     Respiratory: Breathing is regular & unlabored while sitting.     HEENT: Hearing is intact to spoken word.  Speech is clear.  No gross evidence of visual impairment that would impact ambulation.     Dermatologic: Skin is intact to L foot. No paronychia or evidence of soft tissue infection is noted.     Vascular: DP & PT pulses are intact & regular on the L.  No significant edema or varicosities noted.  CFT and skin temperature are normal.     Neurologic: Lower extremity sensation is intact to light touch.  No evidence of weakness or contracture in the lower " extremities.  No evidence of neuropathy.     Musculoskeletal: L 1st MPJ limited ROM with pain at end dorsiflexion.  Pain is along dorsal and distal 1st metatarsal.  Palpable bone spur noted.  Patient is ambulatory without assistive device or brace.  No gross ankle deformity noted.  No foot or ankle joint effusion is noted.    XRs of L foot reviewed with pt.   Mild to moderate joint space narrowing at 1st MPJ with spurring, 1st met elevatus.     ASSESSMENT:   L hallux limitus     PLAN:  Reviewed patient's chart in epic.  Discussed condition and treatment options including pros and cons.    Surgical and non-surgical treatment options for hallux limitus were discussed.  This includes my philosophy about different procedures including cheilectomy, osteotomy and fusion.  I explained how fusion is for late stage treatment of advanced arthritis.  There is no certainty that the non-fusion procedures will improve joint pain that is caused by arthritis.  I explained that hallux limitus is oftentimes surgically treated in a staged manner.  This means that additional surgery may be necessary over time.  Non-operative treatments like stiff soles, orthotics, injection and NSAIDs were discussed.  Shoes that provide extra room for the enlarged joint should be helpful.  I would anticipate somewhat short term benefit from joint injection.    Risks and potential complications of treatment were discussed including side effects from joint injection, limited benefit from orthotics due to the enlarged joint and the progressive nature of this condition.  There may be some need for intraoperative decision making regarding the optimal procedure choice.     Patient is aware that surgery is elective, can be avoided if desired.  The recovery process was discussed including impact to work, walking, shoes and daily activities.  I would anticipate up to 12 months for maximum recovery after surgery.     Pt considering surgery, but is undecided.   She is open to osteotomy vs fusion, intraop decision.  F/u prior if she wishes to proceed.    Advised she remain off her estrogen therapy at least 6 wks prior to surgery to minimize blood clot risk.    Bryon Mclean DPM, FACFAS    Weight management plan: Patient was referred to their PCP to discuss a diet and exercise plan.

## 2020-11-02 NOTE — PATIENT INSTRUCTIONS
"Thank you for choosing [facility]!    DR. MEJIAS'S CLINIC LOCATIONS:     [place] SET UP SURGERY: [phone]   [address]   APPOINTMENTS: [phone]   [place] [address] BILLING Q's: [phone]  [phone] TRIAGE RN:  [phone]       [place]    [address]    [place] [address]    [phone]  [phone]        Follow up: As needed       Please read through the following handouts and if you have any questions, please feel free to call us or send a [employer] message!      DEGENERATIVE ARTHRITIS OF THE BIG TOE JOINT   (hallux limitus/hallux rigidus)   Arthritis of the joint at the base of the big toe (metatarsophalangeal joint) has several causes. Usually it results from repetitive trauma to the joint, secondary to abnormal foot mechanics. Often it is hereditary. However, a one-time traumatic event can lead to arthritis. The condition doesn't improve with time, and even with treatment, can worsen. The cartilage wears out, joint surfaces are no longer smooth, bone rubs on bone, inflammation occurs with pain, and eventually bone spurs and loose fragments might develop.   The joint is often painful with activity, worse with flimsy shoes or walking barefoot, and it slowly progresses over time. A person might notice the toe \"locking up\" with walking. There often is an obvious, and irritating, bony bump on top of the foot. Shoes might be uncomfortable. In some people the pain is so bothersome that recreational activities sometimes even normal daily activities are difficult to perform.   The pain from this arthritis is likely a combination of joint jamming, cartilage loss and inflammation, and irritation from shoes rubbing on the bump. Sometimes other parts of the foot, leg, or back hurt from altering one's walk to compensate for the painful joint.     Ways to help a person live with the discomfort include wearing a good, " supportive shoe with a rigid, rocker-type bottom. An example is a hiking boot. A rigid sole minimizes bending of the joint, and therefore, joint motion and pain. Shoes with a high toe box allow for less rubbing on the bump. Avoiding barefoot walking, sandals, flip-flops and slippers usually helps.   Sometimes an insert or orthotic provides symptom relief. This might make shoe fit more difficult. Pads over the bump and occasionally injections into the joint provide relief.   Surgery for this condition is aimed towards alleviating pain. It does not cure the arthritis nor does it guarantee better joint motion. Depending on the condition of the metatarsophalangeal joint, there are several surgiqal options:    1.  Cutting off the bony bump(s) and cleaning the joint    2.  Loosening the joint up by making cuts in the first metatarsal bone or the big toe bone and removing a small section of bone.    3.  Repositioning bone to minimize jamming of the joint.    4.  In severe cases, the joint is fused. By fusing the joint, it will never bend again. This resolves the pain, because it's the movement of a worn out joint that causes pain. Oftentimes the operation involves a combination of these procedures and. requires the use of screws, pins, and/or a small surgical plate.     Healing after surgery requires about six weeks of protection. This allows the bone to heal. Maximum recovery takes about one year. The scar tissue and joint structures require this amount of time to finish the healing process. Expect stiffness, swelling and numbness during that time frame.   Surgery for arthritis of the metatarsophalangeal joint does involve side effects. Some side effects are predictable and others are less common but do occur. A scar will be visible and could be irritated by shoes. The shoe may rub on the screw or internal pin requiring surgical removal of these fixation devices. The screw and pin would likely be left in place for a full  year. The first toe may remain stiff after surgery. The amount of stiffness is variable. Most people never regain normal motion of the first toe. This is due to scar tissue inherent to any surgery, in addition to the cumUlative effects of arthritis. Sometimes the big toe drifts to one side or the other. Joint fusion is one option to correct an unstable, drifting toe. This procedure straightens the toe, however, no motion remains.   All surgical procedures involve risk of infection, numbness, pain, delayed bone healing, osteotomy (bone cut) dislocation, blood clots, continued foot pain, etc. Arthritic joint surgery is quite complex and should not be taken lightly.    Any skin incision can lead to infection. Deep infection might involve the bone and thus repeat surgery and six weeks of IV antibiotics. Scar tissue can cause nerve pain or numbness. his is generally temporary but can be permanent. We do not have treatments that cure nerve problems. Second toe pain could be related to altered mechanics and pressure transferred to the second toe. Delayed bone healing would lengthen the healing time. Some bones simply do not heal. This requires repeat surgery, electronic bone stimulation and/or extended protection. Smokers have an approximate 20% chance of poor bone healing. This is double that of a non-smoker. The bone cut may displace. This may need to be repaired with a second operation. Displacement can cause joint malalignment. Immobility after surgery can cause a blood clot in the legs and lungs. This could result in death.   Foot pain is complex. Most feet hurt for more than one reason. Operating on the arthritic   big toe joint will not necessarily create a pain free foot. Appropriate shoes, healthy body weight, avoidance of bare foot walking and moderation of activity will always be   necessary to enjoy foot comfort. Arthritis is incurable even with surgery.     Surgery for this type of arthritis is nevertheless  "quite successful. Most surgical patients are pleased with their foot following surgery. Many of the issues described above can be controlled by taking proper care of your foot during the healing process.   Cosmetic bump surgery is discouraged for the reasons listed above. A bump and joint that is comfortable when wearing appropriate shoes should simply be treated with appropriate shoes.   Your surgeon would be happy to fully describe any of the above issues. You should pursue a full understanding of the operation, recovery process and any potential problems that could develop.     SURGERY PLANNING CHECKLIST  If you have decided to have surgery, follow these few steps to get the procedure scheduled and to have the proper paperwork filled out.  If you are unsure about surgery, or would like to sit down and further discuss your issue and treatment options, please make a clinic appointment with Dr Mclean.    1.  Pick the approximate date that you would like to have surgery.  Keep in mind that you will likely need at least 2 weeks off after the procedure for proper rest and healing.    2.  Call the surgery scheduling line at [phone] to get the procedure scheduled. They will help you set up steps 3 and 4 below as well.    3.  Make an appointment to see Dr Mclean within 1 week of the date of surgery for your pre-operative consult.  When making the appointment, say \"I need to make a 30 minute surgical consult with Dr Mclean\".  It is recommended that you bring a spouse, family member or friend with you or have them on the phone during the visit.  There will be lots of information presented.  It can be overwhelming, and it is better to have another pair of ears to help sort out the details.    4.  Make an appointment to see your Primary Care Physician within 4 weeks of the date of surgery for your \"Pre-operative History and Physical\".  This is done to make sure you are medically healthy to undergo surgery.    * If " you have any post-operative questions regarding your procedure, call our triage team at the [facility] at [phone] (option 2 > option 3).

## 2020-11-02 NOTE — LETTER
11/2/2020         RE: Ruthann Beck  [address]  [facility] [address]        Dear Colleague,    Thank you for referring your patient, Ruthann Beck, to the [facility]. Please see a copy of my visit note below.    PATIENT HISTORY:  Ruthann Beck is a 60 year old female who presents to clinic for L 1st MPJ pain.  5 month duration.  She has tried stiff shoes, rest, NSAIDs, topical arthritis cream.  Not helping.  Pain is worse with activity.  Pt reports stopping her hormone therapy 3 wks ago.    I was requested to see this patient for this issue by Dr Holder.    Review of Systems:  Patient denies fever, chills, rash, wound, limping, numbness, weakness, heart burn, blood in stool, chest pain with activity, calf pain when walking, shortness of breath with activity, chronic cough, easy bleeding/bruising, swelling of ankles, excessive thirst, fatigue, depression, anxiety.  Patient admits to stiffness.     PAST MEDICAL HISTORY:   Past Medical History:   Diagnosis Date     NO ACTIVE PROBLEMS         PAST SURGICAL HISTORY:   Past Surgical History:   Procedure Laterality Date     C APPENDECTOMY  9/09     C LEG/ANKLE SURGERY PROC UNLISTED  1980    fx ankle, pins and screws placed     COLONOSCOPY N/A 7/14/2020    Procedure: COLONOSCOPY;  Surgeon: Kaylin Gibson MD;  Location: [facility] OR     COLONOSCOPY N/A 7/14/2020    Procedure: Colonoscopy, With Polypectomy And Biopsy;  Surgeon: Kaylin Gibson MD;  Location: [facility] OR     CRYOCAUTERY OF CERVIX  1982     HC EXCISION OF LINGUAL TONSIL          MEDICATIONS:   Current Outpatient Medications:      estradiol (VIVELLE-DOT) 0.05 MG/24HR patch, Place 1 patch onto the skin twice a week, Disp: , Rfl:      Progesterone Micronized (PROMETRIUM PO), Take  by mouth daily. 1 tablet daily, Disp: , Rfl:      ALLERGIES:    Allergies   Allergen Reactions     Bee Venom      swelling     No Known Drug Allergies         SOCIAL  HISTORY:   Social History     Socioeconomic History     Marital status:      Spouse name: Alon     Number of children: 2     Years of education: Not on file     Highest education level: Not on file   Occupational History     Occupation: baker     Employer: [employer]     Comment: baker [employer]   Social Needs     Financial resource strain: Not on file     Food insecurity     Worry: Not on file     Inability: Not on file     Transportation needs     Medical: Not on file     Non-medical: Not on file   Tobacco Use     Smoking status: Never Smoker     Smokeless tobacco: Never Used   Substance and Sexual Activity     Alcohol use: Yes     Comment: 1 beer or glass of wine a day     Drug use: No     Sexual activity: Yes     Partners: Male   Lifestyle     Physical activity     Days per week: Not on file     Minutes per session: Not on file     Stress: Not on file   Relationships     Social connections     Talks on phone: Not on file     Gets together: Not on file     Attends Yazidi service: Not on file     Active member of club or organization: Not on file     Attends meetings of clubs or organizations: Not on file     Relationship status: Not on file     Intimate partner violence     Fear of current or ex partner: Not on file     Emotionally abused: Not on file     Physically abused: Not on file     Forced sexual activity: Not on file   Other Topics Concern      Service No     Blood Transfusions No     Caffeine Concern Yes     Comment: 2 cups of coffee a day     Occupational Exposure No     Hobby Hazards No     Sleep Concern No     Stress Concern No     Weight Concern No     Special Diet No     Back Care No     Exercise Yes     Comment: runs 5 times a week 2 to 3 miles each time     Bike Helmet Yes     Seat Belt Yes     Self-Exams No     Parent/sibling w/ CABG, MI or angioplasty before 65F 55M? No   Social History Narrative    Balanced Diet - Yes    Osteoporosis Prevention Measures - Dairy servings  "per day: 2 to 3 servings daily    Regular Exercise -  Yes Describe swims twice a week for 1 mile    Dental Exam - YES - Date: 3 months ago    Eye Exam - YES - Date: 1 year ago    Self Breast Exam - No    Abuse: Current or Past (Physical, Sexual or Emotional)- No    Do you feel safe in your environment - Yes    Guns stored in the home - No    Sunscreen used - No    Seatbelts used - Yes    Lipids -  NO    Glucose -  YES - Date: 3-05    Colon Cancer Screening - No    Hemoccults - NO    Pap Test -  YES - Date: 1 year ago, does at ob/gyn, jhx of abnormal pap, cryosurgery    Do you have any concerns about STD's -  No    Mammography - YES - Date:     DEXA - NO    Immunizations reviewed and up to date - thinks it's been about 10 years since her last td.    08  YAMILET Acuna CMA        FAMILY HISTORY:   Family History   Problem Relation Age of Onset     Depression Mother         manic depressive     Hypertension Father      Cardiovascular Father         stroke  at age 71     Arthritis Father         gout     Diabetes Maternal Grandmother      Breast Cancer Paternal Grandmother      Family History Negative Sister      Family History Negative Sister      Family History Negative Sister      Family History Negative Brother      Family History Negative Brother         EXAM:Vitals: /72   Ht 1.651 m (5' 5\")   Wt 68 kg (150 lb)   LMP 2010   BMI 24.96 kg/m    BMI= Body mass index is 24.96 kg/m .    General appearance: Patient is alert and fully cooperative with history & exam.  No sign of distress is noted during the visit.     Psychiatric: Affect is pleasant & appropriate.  Patient appears motivated to improve health.     Respiratory: Breathing is regular & unlabored while sitting.     HEENT: Hearing is intact to spoken word.  Speech is clear.  No gross evidence of visual impairment that would impact ambulation.     Dermatologic: Skin is intact to L foot. No paronychia or evidence of soft tissue infection is " noted.     Vascular: DP & PT pulses are intact & regular on the L.  No significant edema or varicosities noted.  CFT and skin temperature are normal.     Neurologic: Lower extremity sensation is intact to light touch.  No evidence of weakness or contracture in the lower extremities.  No evidence of neuropathy.     Musculoskeletal: L 1st MPJ limited ROM with pain at end dorsiflexion.  Pain is along dorsal and distal 1st metatarsal.  Palpable bone spur noted.  Patient is ambulatory without assistive device or brace.  No gross ankle deformity noted.  No foot or ankle joint effusion is noted.    XRs of L foot reviewed with pt.   Mild to moderate joint space narrowing at 1st MPJ with spurring, 1st met elevatus.     ASSESSMENT:   L hallux limitus     PLAN:  Reviewed patient's chart in epic.  Discussed condition and treatment options including pros and cons.    Surgical and non-surgical treatment options for hallux limitus were discussed.  This includes my philosophy about different procedures including cheilectomy, osteotomy and fusion.  I explained how fusion is for late stage treatment of advanced arthritis.  There is no certainty that the non-fusion procedures will improve joint pain that is caused by arthritis.  I explained that hallux limitus is oftentimes surgically treated in a staged manner.  This means that additional surgery may be necessary over time.  Non-operative treatments like stiff soles, orthotics, injection and NSAIDs were discussed.  Shoes that provide extra room for the enlarged joint should be helpful.  I would anticipate somewhat short term benefit from joint injection.    Risks and potential complications of treatment were discussed including side effects from joint injection, limited benefit from orthotics due to the enlarged joint and the progressive nature of this condition.  There may be some need for intraoperative decision making regarding the optimal procedure choice.     Patient is aware  that surgery is elective, can be avoided if desired.  The recovery process was discussed including impact to work, walking, shoes and daily activities.  I would anticipate up to 12 months for maximum recovery after surgery.     Pt considering surgery, but is undecided.  She is open to osteotomy vs fusion, intraop decision.  F/u prior if she wishes to proceed.    Advised she remain off her estrogen therapy at least 6 wks prior to surgery to minimize blood clot risk.    Bryon Mclean DPM, FACFAS    Weight management plan: Patient was referred to their PCP to discuss a diet and exercise plan.        Again, thank you for allowing me to participate in the care of your patient.        Sincerely,        Bryon Mclean DPM

## 2020-11-07 ENCOUNTER — HEALTH MAINTENANCE LETTER (OUTPATIENT)
Age: 60
End: 2020-11-07

## 2020-11-30 ENCOUNTER — OFFICE VISIT (OUTPATIENT)
Dept: FAMILY MEDICINE | Facility: CLINIC | Age: 60
End: 2020-11-30
Payer: COMMERCIAL

## 2020-11-30 ENCOUNTER — ANCILLARY PROCEDURE (OUTPATIENT)
Dept: GENERAL RADIOLOGY | Facility: CLINIC | Age: 60
End: 2020-11-30
Attending: NURSE PRACTITIONER
Payer: COMMERCIAL

## 2020-11-30 VITALS
HEIGHT: 67 IN | RESPIRATION RATE: 14 BRPM | SYSTOLIC BLOOD PRESSURE: 132 MMHG | TEMPERATURE: 97.4 F | WEIGHT: 163 LBS | HEART RATE: 85 BPM | DIASTOLIC BLOOD PRESSURE: 81 MMHG | BODY MASS INDEX: 25.58 KG/M2 | OXYGEN SATURATION: 95 %

## 2020-11-30 DIAGNOSIS — M54.2 NECK PAIN: Primary | ICD-10-CM

## 2020-11-30 DIAGNOSIS — M54.2 NECK PAIN: ICD-10-CM

## 2020-11-30 DIAGNOSIS — M43.10 RETROLISTHESIS OF VERTEBRAE: ICD-10-CM

## 2020-11-30 PROCEDURE — 72040 X-RAY EXAM NECK SPINE 2-3 VW: CPT | Performed by: RADIOLOGY

## 2020-11-30 PROCEDURE — 99214 OFFICE O/P EST MOD 30 MIN: CPT | Performed by: NURSE PRACTITIONER

## 2020-11-30 RX ORDER — TIZANIDINE HYDROCHLORIDE 4 MG/1
4 CAPSULE, GELATIN COATED ORAL 3 TIMES DAILY
Qty: 20 CAPSULE | Refills: 0 | Status: SHIPPED | OUTPATIENT
Start: 2020-11-30 | End: 2021-07-29

## 2020-11-30 ASSESSMENT — MIFFLIN-ST. JEOR: SCORE: 1343.02

## 2020-11-30 NOTE — PROGRESS NOTES
"Subjective     Ruthann Beck is a 60 year old female who presents to clinic today for the following health issues:    HPI        Musculoskeletal problem/pain      Duration: 6 weeks    Description  Location: Neck     Intensity:  Varies     Accompanying signs and symptoms: none    History  Previous similar problem: no   Previous evaluation:  none    Precipitating or alleviating factors:  Trauma or overuse: YES- maybe working on a ceiling   Aggravating factors include: it just happens out of no where.     Therapies tried and outcome: massage and Ibuprofen     Was hyperextending her neck while working on a ceiling for over a week, pains started the next week  Feels tight all the time  Occasional squeezing/muscle tension  Flares with rotation and extension of the neck.      Review of Systems   Constitutional, HEENT, cardiovascular, pulmonary, GI, , musculoskeletal, neuro, skin, endocrine and psych systems are negative, except as otherwise noted.      Objective    /81 (BP Location: Right arm, Patient Position: Chair, Cuff Size: Adult Regular)   Pulse 85   Temp 97.4  F (36.3  C) (Tympanic)   Resp 14   Ht 1.695 m (5' 6.75\")   Wt 73.9 kg (163 lb)   LMP 04/05/2010   SpO2 95%   BMI 25.72 kg/m    Body mass index is 25.72 kg/m .  Physical Exam   GENERAL: healthy, alert and no distress  EYES: Eyes grossly normal to inspection, PERRL and conjunctivae and sclerae normal  NECK: no adenopathy, no asymmetry, masses, or scars and thyroid normal to palpation  MS: FROM to neck and shoulders.  no gross musculoskeletal defects noted, no edema  SKIN: no suspicious lesions or rashes  NEURO: Normal strength and tone, mentation intact and speech normal    Results for orders placed or performed in visit on 11/30/20   XR Cervical Spine 2/3 Views     Status: None    Narrative    CERVICAL SPINE THREE VIEWS  11/30/2020 12:27 PM     HISTORY: Neck pain.    COMPARISON: None.      Impression    IMPRESSION: There is minimal " retrolisthesis at C5-C6. Posterior  alignment is otherwise normal. Vertebral body heights are maintained.  There is mild to moderate degenerative disc disease at C4-C5 and C5-C6  and some mild mid to lower cervical facet arthropathy. Soft tissues  are unremarkable as visualized.    REE ENRIQUEZ MD           Assessment & Plan     Neck pain  Refer to [facility] for PT, ortho for retrolisthesis and zanaflex PRN for muscle tension.  Gentle massage and heat PRN pain.    Recommend aleve BID pain.      - XR Cervical Spine 2/3 Views  - tiZANidine (ZANAFLEX) 4 MG capsule  Dispense: 20 capsule; Refill: 0          See Patient Instructions    No follow-ups on file.    REMINGTON Lucero CNP  [facility]

## 2020-12-02 ENCOUNTER — VIRTUAL VISIT (OUTPATIENT)
Dept: ORTHOPEDICS | Facility: CLINIC | Age: 60
End: 2020-12-02
Attending: NURSE PRACTITIONER
Payer: COMMERCIAL

## 2020-12-02 ENCOUNTER — TELEPHONE (OUTPATIENT)
Dept: FAMILY MEDICINE | Facility: CLINIC | Age: 60
End: 2020-12-02

## 2020-12-02 VITALS — WEIGHT: 163 LBS | BODY MASS INDEX: 27.16 KG/M2 | HEIGHT: 65 IN

## 2020-12-02 DIAGNOSIS — M54.2 NECK PAIN: ICD-10-CM

## 2020-12-02 DIAGNOSIS — M47.812 SPONDYLOSIS OF CERVICAL REGION WITHOUT MYELOPATHY OR RADICULOPATHY: Primary | ICD-10-CM

## 2020-12-02 PROCEDURE — 99202 OFFICE O/P NEW SF 15 MIN: CPT | Mod: GT | Performed by: FAMILY MEDICINE

## 2020-12-02 ASSESSMENT — MIFFLIN-ST. JEOR: SCORE: 1315.24

## 2020-12-02 NOTE — PROGRESS NOTES
2:33PM--First attempt to reach patient for check in. Left voicemail.     2:38PM--Second attempt to reach patient. Left voicemail. Patient in virtual waiting room.     2:43PM--Third attempt to reach patient. Will check-in over virtual.

## 2020-12-02 NOTE — PROGRESS NOTES
"Ruthann Beck is a 60 year old female who is being evaluated via a billable video visit.      The patient has been notified of following:     \"This video visit will be conducted via a call between you and your physician/provider. We have found that certain health care needs can be provided without the need for an in-person physical exam.  This service lets us provide the care you need with a video conversation.  If a prescription is necessary we can send it directly to your pharmacy.  If lab work is needed we can place an order for that and you can then stop by our lab to have the test done at a later time.    Video visits are billed at different rates depending on your insurance coverage.  Please reach out to your insurance provider with any questions.    If during the course of the call the physician/provider feels a video visit is not appropriate, you will not be charged for this service.\"    Patient has given verbal consent for Video visit? Yes  How would you like to obtain your AVS? MyChart  Will anyone else be joining your video visit? No          Video-Visit Details    Type of service:  Video Visit    Video Start Time: 2:50p  Stop:  3:13 p    Originating Location (pt. Location): Home    Distant Location (provider location):  [facility]     Platform used for Video Visit: Izooble    PMH:  Past Medical History:   Diagnosis Date     NO ACTIVE PROBLEMS        Active problem list:  Patient Active Problem List   Diagnosis     Lumbar radiculopathy     CARDIOVASCULAR SCREENING; LDL GOAL LESS THAN 160     Common wart     Hip pain     Left knee pain     Right knee pain     Lumbar facet arthropathy     Lumbar degenerative disc disease     Chronic right-sided low back pain without sciatica       FH:  Family History   Problem Relation Age of Onset     Depression Mother         manic depressive     Hypertension Father      Cardiovascular Father         stroke  at age " 71     Arthritis Father         gout     Diabetes Maternal Grandmother      Breast Cancer Paternal Grandmother      Family History Negative Sister      Family History Negative Sister      Family History Negative Sister      Family History Negative Brother      Family History Negative Brother        SH:  Social History     Socioeconomic History     Marital status:      Spouse name: Alon     Number of children: 2     Years of education: Not on file     Highest education level: Not on file   Occupational History     Occupation: baker     Employer: [employer]     Comment: [email or web address]   Social Needs     Financial resource strain: Not on file     Food insecurity     Worry: Not on file     Inability: Not on file     Transportation needs     Medical: Not on file     Non-medical: Not on file   Tobacco Use     Smoking status: Never Smoker     Smokeless tobacco: Never Used   Substance and Sexual Activity     Alcohol use: Yes     Comment: 1 beer or glass of wine a day     Drug use: No     Sexual activity: Yes     Partners: Male   Lifestyle     Physical activity     Days per week: Not on file     Minutes per session: Not on file     Stress: Not on file   Relationships     Social connections     Talks on phone: Not on file     Gets together: Not on file     Attends [employer] service: Not on file     Active member of club or organization: Not on file     Attends meetings of clubs or organizations: Not on file     Relationship status: Not on file     Intimate partner violence     Fear of current or ex partner: Not on file     Emotionally abused: Not on file     Physically abused: Not on file     Forced sexual activity: Not on file   Other Topics Concern      Service No     Blood Transfusions No     Caffeine Concern Yes     Comment: 2 cups of coffee a day     Occupational Exposure No     Hobby Hazards No     Sleep Concern No     Stress Concern No     Weight Concern No     Special Diet No     Back Care No      Exercise Yes     Comment: runs 5 times a week 2 to 3 miles each time     Bike Helmet Yes     Seat Belt Yes     Self-Exams No     Parent/sibling w/ CABG, MI or angioplasty before 65F 55M? No   Social History Narrative    Balanced Diet - Yes    Osteoporosis Prevention Measures - Dairy servings per day: 2 to 3 servings daily    Regular Exercise -  Yes Describe swims twice a week for 1 mile    Dental Exam - YES - Date: 3 months ago    Eye Exam - YES - Date: 1 year ago    Self Breast Exam - No    Abuse: Current or Past (Physical, Sexual or Emotional)- No    Do you feel safe in your environment - Yes    Guns stored in the home - No    Sunscreen used - No    Seatbelts used - Yes    Lipids -  NO    Glucose -  YES - Date: 3-05    Colon Cancer Screening - No    Hemoccults - NO    Pap Test -  YES - Date: 1 year ago, does at ob/gyn, jhx of abnormal pap, cryosurgery    Do you have any concerns about STD's -  No    Mammography - YES - Date: 1-07    DEXA - NO    Immunizations reviewed and up to date - thinks it's been about 10 years since her last td.    2-01-08  YAMILET Acuna [facility]       MEDS:  See EMR, reviewed  ALL:  See EMR, reviewed    REVIEW OF SYSTEMS:  CONSTITUTIONAL:NEGATIVE for fever, chills, change in weight  INTEGUMENTARY/SKIN: NEGATIVE for worrisome rashes, moles or lesions  EYES: NEGATIVE for vision changes or irritation  ENT/MOUTH: NEGATIVE for ear, mouth and throat problems  RESP:NEGATIVE for significant cough or SOB  BREAST: NEGATIVE for masses, tenderness or discharge  CV: NEGATIVE for chest pain, palpitations or peripheral edema  GI: NEGATIVE for nausea, abdominal pain, heartburn, or change in bowel habits  :NEGATIVE for frequency, dysuria, or hematuria  :NEGATIVE for frequency, dysuria, or hematuria  NEURO: NEGATIVE for weakness, dizziness or paresthesias  ENDOCRINE: NEGATIVE for temperature intolerance, skin/hair changes  HEME/ALLERGY/IMMUNE: NEGATIVE for bleeding problems  PSYCHIATRIC: NEGATIVE for changes in  "mood or affect       CERVICAL SPINE THREE VIEWS  11/30/2020 12:27 PM      HISTORY: Neck pain.     COMPARISON: None.                                                                      IMPRESSION: There is minimal retrolisthesis at C5-C6. Posterior  alignment is otherwise normal. Vertebral body heights are maintained.  There is mild to moderate degenerative disc disease at C4-C5 and C5-C6  and some mild mid to lower cervical facet arthropathy. Soft tissues  are unremarkable as visualized.     REE ENRIQUEZ MD      Subjective: Onset of posterior neck discomfort from the upper part of the bilateral upper back up the bilateral sides of the neck to the base of the occiput.  Not associated with radicular discomfort to either upper extremity.  No numbness or tingling in the upper extremity.  Onset 8 weeks ago after doing an extended project over a period of days where she was extending her neck to work on a remodeling project involving the ceiling.  Pain has been \"unpredictable\" since then.  When it is present it is localized to the posterior neck,  and can improve somewhat with massage.  Ibuprofen has only been mildly helpful.  Patient likes to avoid taking pain medicines.  She denies a past history of neck injuries.  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  We went over her x-ray results today which show degenerative change at C4-C5 and C5-C6 a moderate degree with facet arthropathy.  This results in minimal retrolisthesis of C5 on C6.    Objective:        GENERAL: healthy, alert, without distress  EYES: Eyes grossly normal to inspection.   HENT: normal cephalic/atraumatic  NECK: No asymmetry, visible masses or scars  RESP: No audible wheeze, cough, or visible cyanosis.  No visible retractions or increased work of breathing.    SKIN: Visible skin clear. No visible rash, abnormal pigmentation or lesions.  NEURO: Cranial nerves grossly intact.  Mentation and speech " appropriate for age.  PSYCH: mentation appears normal, concentration appears appropriate, judgement and insight intact and appearance well groomed  MSK: Today she tolerates full extension of the neck without limitation, and also chin to chest.  Side to side movements and ear to shoulder movements are also well tolerated through adequate range of motion.  There are no impingement signs at either shoulder.  Her grasp strength seems strong and symmetrical to her bilaterally.    Assessment: Degenerative disc and joint disease of the cervical spine    Plan: We discussed conservative options including massage, heating pad, heat alternating with ice, avoiding excessive neck extension, physical therapy.  We discussed pain medicine options such as trying a different nonsteroidal anti-inflammatory, trying a muscle relaxant to assist with sleep at night.  She declined the need for additional medicines.  She plans on following through with physical therapy and she will notify me if she is not making improvements over the next 4 weeks.  She knows the next option if not improving could include an MRI of the cervical spine, and if necessary facet injections could be considered.  She had no further questions.

## 2020-12-02 NOTE — LETTER
"12/2/2020       RE: Ruthann Beck  [address]  [place] [facility] [address]     Dear Colleague,    Thank you for referring your patient, Ruthann Beck, to the [facility]. Please see a copy of my visit note below.    2:33PM--First attempt to reach patient for check in. Left voicemail.     2:38PM--Second attempt to reach patient. Left voicemail. Patient in virtual waiting room.     2:43PM--Third attempt to reach patient. Will check-in over virtual.    Ruthann Beck is a 60 year old female who is being evaluated via a billable video visit.      The patient has been notified of following:     \"This video visit will be conducted via a call between you and your physician/provider. We have found that certain health care needs can be provided without the need for an in-person physical exam.  This service lets us provide the care you need with a video conversation.  If a prescription is necessary we can send it directly to your pharmacy.  If lab work is needed we can place an order for that and you can then stop by our lab to have the test done at a later time.    Video visits are billed at different rates depending on your insurance coverage.  Please reach out to your insurance provider with any questions.    If during the course of the call the physician/provider feels a video visit is not appropriate, you will not be charged for this service.\"    Patient has given verbal consent for Video visit? Yes  How would you like to obtain your AVS? MyChart  Will anyone else be joining your video visit? No          Video-Visit Details    Type of service:  Video Visit    Video Start Time: 2:50p  Stop:  3:13 p    Originating Location (pt. Location): Home    Distant Location (provider location):  [facility]     Platform used for Video Visit: Lily    PMH:  Past " Medical History:   Diagnosis Date     NO ACTIVE PROBLEMS        Active problem list:  Patient Active Problem List   Diagnosis     Lumbar radiculopathy     CARDIOVASCULAR SCREENING; LDL GOAL LESS THAN 160     Common wart     Hip pain     Left knee pain     Right knee pain     Lumbar facet arthropathy     Lumbar degenerative disc disease     Chronic right-sided low back pain without sciatica       FH:  Family History   Problem Relation Age of Onset     Depression Mother         manic depressive     Hypertension Father      Cardiovascular Father         stroke  at age 71     Arthritis Father         gout     Diabetes Maternal Grandmother      Breast Cancer Paternal Grandmother      Family History Negative Sister      Family History Negative Sister      Family History Negative Sister      Family History Negative Brother      Family History Negative Brother        SH:  Social History     Socioeconomic History     Marital status:      Spouse name: Alon     Number of children: 2     Years of education: Not on file     Highest education level: Not on file   Occupational History     Occupation: baker     Employer: [employer]     Comment: [employer]   Social Needs     Financial resource strain: Not on file     Food insecurity     Worry: Not on file     Inability: Not on file     Transportation needs     Medical: Not on file     Non-medical: Not on file   Tobacco Use     Smoking status: Never Smoker     Smokeless tobacco: Never Used   Substance and Sexual Activity     Alcohol use: Yes     Comment: 1 beer or glass of wine a day     Drug use: No     Sexual activity: Yes     Partners: Male   Lifestyle     Physical activity     Days per week: Not on file     Minutes per session: Not on file     Stress: Not on file   Relationships     Social connections     Talks on phone: Not on file     Gets together: Not on file     Attends Yarsanism service: Not on file     Active member of club or organization: Not on file      Attends meetings of clubs or organizations: Not on file     Relationship status: Not on file     Intimate partner violence     Fear of current or ex partner: Not on file     Emotionally abused: Not on file     Physically abused: Not on file     Forced sexual activity: Not on file   Other Topics Concern      Service No     Blood Transfusions No     Caffeine Concern Yes     Comment: 2 cups of coffee a day     Occupational Exposure No     Hobby Hazards No     Sleep Concern No     Stress Concern No     Weight Concern No     Special Diet No     Back Care No     Exercise Yes     Comment: runs 5 times a week 2 to 3 miles each time     Bike Helmet Yes     Seat Belt Yes     Self-Exams No     Parent/sibling w/ CABG, MI or angioplasty before 65F 55M? No   Social History Narrative    Balanced Diet - Yes    Osteoporosis Prevention Measures - Dairy servings per day: 2 to 3 servings daily    Regular Exercise -  Yes Describe swims twice a week for 1 mile    Dental Exam - YES - Date: 3 months ago    Eye Exam - YES - Date: 1 year ago    Self Breast Exam - No    Abuse: Current or Past (Physical, Sexual or Emotional)- No    Do you feel safe in your environment - Yes    Guns stored in the home - No    Sunscreen used - No    Seatbelts used - Yes    Lipids -  NO    Glucose -  YES - Date: 3-05    Colon Cancer Screening - No    Hemoccults - NO    Pap Test -  YES - Date: 1 year ago, does at ob/gyn, jhx of abnormal pap, cryosurgery    Do you have any concerns about STD's -  No    Mammography - YES - Date: 1-07    DEXA - NO    Immunizations reviewed and up to date - thinks it's been about 10 years since her last td.    2-01-08  YAMILET Acuna [facility]       MEDS:  See EMR, reviewed  ALL:  See EMR, reviewed    REVIEW OF SYSTEMS:  CONSTITUTIONAL:NEGATIVE for fever, chills, change in weight  INTEGUMENTARY/SKIN: NEGATIVE for worrisome rashes, moles or lesions  EYES: NEGATIVE for vision changes or irritation  ENT/MOUTH: NEGATIVE for ear, mouth  "and throat problems  RESP:NEGATIVE for significant cough or SOB  BREAST: NEGATIVE for masses, tenderness or discharge  CV: NEGATIVE for chest pain, palpitations or peripheral edema  GI: NEGATIVE for nausea, abdominal pain, heartburn, or change in bowel habits  :NEGATIVE for frequency, dysuria, or hematuria  :NEGATIVE for frequency, dysuria, or hematuria  NEURO: NEGATIVE for weakness, dizziness or paresthesias  ENDOCRINE: NEGATIVE for temperature intolerance, skin/hair changes  HEME/ALLERGY/IMMUNE: NEGATIVE for bleeding problems  PSYCHIATRIC: NEGATIVE for changes in mood or affect       CERVICAL SPINE THREE VIEWS  11/30/2020 12:27 PM      HISTORY: Neck pain.     COMPARISON: None.                                                                      IMPRESSION: There is minimal retrolisthesis at C5-C6. Posterior  alignment is otherwise normal. Vertebral body heights are maintained.  There is mild to moderate degenerative disc disease at C4-C5 and C5-C6  and some mild mid to lower cervical facet arthropathy. Soft tissues  are unremarkable as visualized.     REE ENRIQUEZ MD      Subjective: Onset of posterior neck discomfort from the upper part of the bilateral upper back up the bilateral sides of the neck to the base of the occiput.  Not associated with radicular discomfort to either upper extremity.  No numbness or tingling in the upper extremity.  Onset 8 weeks ago after doing an extended project over a period of days where she was extending her neck to work on a remodeling project involving the ceiling.  Pain has been \"unpredictable\" since then.  When it is present it is localized to the posterior neck,  and can improve somewhat with massage.  Ibuprofen has only been mildly helpful.  Patient likes to avoid taking pain medicines.  She denies a past history of neck injuries.  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  We went over her x-ray results today which show " degenerative change at C4-C5 and C5-C6 a moderate degree with facet arthropathy.  This results in minimal retrolisthesis of C5 on C6.    Objective:        GENERAL: healthy, alert, without distress  EYES: Eyes grossly normal to inspection.   HENT: normal cephalic/atraumatic  NECK: No asymmetry, visible masses or scars  RESP: No audible wheeze, cough, or visible cyanosis.  No visible retractions or increased work of breathing.    SKIN: Visible skin clear. No visible rash, abnormal pigmentation or lesions.  NEURO: Cranial nerves grossly intact.  Mentation and speech appropriate for age.  PSYCH: mentation appears normal, concentration appears appropriate, judgement and insight intact and appearance well groomed  MSK: Today she tolerates full extension of the neck without limitation, and also chin to chest.  Side to side movements and ear to shoulder movements are also well tolerated through adequate range of motion.  There are no impingement signs at either shoulder.  Her grasp strength seems strong and symmetrical to her bilaterally.    Assessment: Degenerative disc and joint disease of the cervical spine    Plan: We discussed conservative options including massage, heating pad, heat alternating with ice, avoiding excessive neck extension, physical therapy.  We discussed pain medicine options such as trying a different nonsteroidal anti-inflammatory, trying a muscle relaxant to assist with sleep at night.  She declined the need for additional medicines.  She plans on following through with physical therapy and she will notify me if she is not making improvements over the next 4 weeks.  She knows the next option if not improving could include an MRI of the cervical spine, and if necessary facet injections could be considered.  She had no further questions.      Again, thank you for allowing me to participate in the care of your patient.      Sincerely,    Mir Gresham MD

## 2020-12-02 NOTE — LETTER
"  12/2/2020      RE: Ruthann Beck  [address]  [facility] [address]       2:33PM--First attempt to reach patient for check in. Left voicemail.     2:38PM--Second attempt to reach patient. Left voicemail. Patient in virtual waiting room.     2:43PM--Third attempt to reach patient. Will check-in over virtual.    Ruthann Beck is a 60 year old female who is being evaluated via a billable video visit.      The patient has been notified of following:     \"This video visit will be conducted via a call between you and your physician/provider. We have found that certain health care needs can be provided without the need for an in-person physical exam.  This service lets us provide the care you need with a video conversation.  If a prescription is necessary we can send it directly to your pharmacy.  If lab work is needed we can place an order for that and you can then stop by our lab to have the test done at a later time.    Video visits are billed at different rates depending on your insurance coverage.  Please reach out to your insurance provider with any questions.    If during the course of the call the physician/provider feels a video visit is not appropriate, you will not be charged for this service.\"    Patient has given verbal consent for Video visit? Yes  How would you like to obtain your AVS? MyChart  Will anyone else be joining your video visit? No          Video-Visit Details    Type of service:  Video Visit    Video Start Time: 2:50p  Stop:  3:13 p    Originating Location (pt. Location): Home    Distant Location (provider location):  [facility]     Platform used for Video Visit: Convoe    PMH:  Past Medical History:   Diagnosis Date     NO ACTIVE PROBLEMS        Active problem list:  Patient Active Problem List   Diagnosis     Lumbar radiculopathy     CARDIOVASCULAR SCREENING; LDL GOAL LESS THAN 160     Common wart     Hip pain     Left " knee pain     Right knee pain     Lumbar facet arthropathy     Lumbar degenerative disc disease     Chronic right-sided low back pain without sciatica       FH:  Family History   Problem Relation Age of Onset     Depression Mother         manic depressive     Hypertension Father      Cardiovascular Father         stroke  at age 71     Arthritis Father         gout     Diabetes Maternal Grandmother      Breast Cancer Paternal Grandmother      Family History Negative Sister      Family History Negative Sister      Family History Negative Sister      Family History Negative Brother      Family History Negative Brother        SH:  Social History     Socioeconomic History     Marital status:      Spouse name: Alon     Number of children: 2     Years of education: Not on file     Highest education level: Not on file   Occupational History     Occupation: baker     Employer: [employer]     Comment: baker [employer]   Social Needs     Financial resource strain: Not on file     Food insecurity     Worry: Not on file     Inability: Not on file     Transportation needs     Medical: Not on file     Non-medical: Not on file   Tobacco Use     Smoking status: Never Smoker     Smokeless tobacco: Never Used   Substance and Sexual Activity     Alcohol use: Yes     Comment: 1 beer or glass of wine a day     Drug use: No     Sexual activity: Yes     Partners: Male   Lifestyle     Physical activity     Days per week: Not on file     Minutes per session: Not on file     Stress: Not on file   Relationships     Social connections     Talks on phone: Not on file     Gets together: Not on file     Attends [employer] service: Not on file     Active member of club or organization: Not on file     Attends meetings of clubs or organizations: Not on file     Relationship status: Not on file     Intimate partner violence     Fear of current or ex partner: Not on file     Emotionally abused: Not on file     Physically abused: Not on  file     Forced sexual activity: Not on file   Other Topics Concern      Service No     Blood Transfusions No     Caffeine Concern Yes     Comment: 2 cups of coffee a day     Occupational Exposure No     Hobby Hazards No     Sleep Concern No     Stress Concern No     Weight Concern No     Special Diet No     Back Care No     Exercise Yes     Comment: runs 5 times a week 2 to 3 miles each time     Bike Helmet Yes     Seat Belt Yes     Self-Exams No     Parent/sibling w/ CABG, MI or angioplasty before 65F 55M? No   Social History Narrative    Balanced Diet - Yes    Osteoporosis Prevention Measures - Dairy servings per day: 2 to 3 servings daily    Regular Exercise -  Yes Describe swims twice a week for 1 mile    Dental Exam - YES - Date: 3 months ago    Eye Exam - YES - Date: 1 year ago    Self Breast Exam - No    Abuse: Current or Past (Physical, Sexual or Emotional)- No    Do you feel safe in your environment - Yes    Guns stored in the home - No    Sunscreen used - No    Seatbelts used - Yes    Lipids -  NO    Glucose -  YES - Date: 3-05    Colon Cancer Screening - No    Hemoccults - NO    Pap Test -  YES - Date: 1 year ago, does at ob/gyn, jhx of abnormal pap, cryosurgery    Do you have any concerns about STD's -  No    Mammography - YES - Date: 1-07    DEXA - NO    Immunizations reviewed and up to date - thinks it's been about 10 years since her last td.    2-01-08  YAMILET Acuna [facility]       MEDS:  See EMR, reviewed  ALL:  See EMR, reviewed    REVIEW OF SYSTEMS:  CONSTITUTIONAL:NEGATIVE for fever, chills, change in weight  INTEGUMENTARY/SKIN: NEGATIVE for worrisome rashes, moles or lesions  EYES: NEGATIVE for vision changes or irritation  ENT/MOUTH: NEGATIVE for ear, mouth and throat problems  RESP:NEGATIVE for significant cough or SOB  BREAST: NEGATIVE for masses, tenderness or discharge  CV: NEGATIVE for chest pain, palpitations or peripheral edema  GI: NEGATIVE for nausea, abdominal pain, heartburn, or  "change in bowel habits  :NEGATIVE for frequency, dysuria, or hematuria  :NEGATIVE for frequency, dysuria, or hematuria  NEURO: NEGATIVE for weakness, dizziness or paresthesias  ENDOCRINE: NEGATIVE for temperature intolerance, skin/hair changes  HEME/ALLERGY/IMMUNE: NEGATIVE for bleeding problems  PSYCHIATRIC: NEGATIVE for changes in mood or affect       CERVICAL SPINE THREE VIEWS  11/30/2020 12:27 PM      HISTORY: Neck pain.     COMPARISON: None.                                                                      IMPRESSION: There is minimal retrolisthesis at C5-C6. Posterior  alignment is otherwise normal. Vertebral body heights are maintained.  There is mild to moderate degenerative disc disease at C4-C5 and C5-C6  and some mild mid to lower cervical facet arthropathy. Soft tissues  are unremarkable as visualized.     REE ENRIQUEZ MD      Subjective: Onset of posterior neck discomfort from the upper part of the bilateral upper back up the bilateral sides of the neck to the base of the occiput.  Not associated with radicular discomfort to either upper extremity.  No numbness or tingling in the upper extremity.  Onset 8 weeks ago after doing an extended project over a period of days where she was extending her neck to work on a remodeling project involving the ceiling.  Pain has been \"unpredictable\" since then.  When it is present it is localized to the posterior neck,  and can improve somewhat with massage.  Ibuprofen has only been mildly helpful.  Patient likes to avoid taking pain medicines.  She denies a past history of neck injuries.  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  We went over her x-ray results today which show degenerative change at C4-C5 and C5-C6 a moderate degree with facet arthropathy.  This results in minimal retrolisthesis of C5 on C6.    Objective:        GENERAL: healthy, alert, without distress  EYES: Eyes grossly normal to " inspection.   HENT: normal cephalic/atraumatic  NECK: No asymmetry, visible masses or scars  RESP: No audible wheeze, cough, or visible cyanosis.  No visible retractions or increased work of breathing.    SKIN: Visible skin clear. No visible rash, abnormal pigmentation or lesions.  NEURO: Cranial nerves grossly intact.  Mentation and speech appropriate for age.  PSYCH: mentation appears normal, concentration appears appropriate, judgement and insight intact and appearance well groomed  MSK: Today she tolerates full extension of the neck without limitation, and also chin to chest.  Side to side movements and ear to shoulder movements are also well tolerated through adequate range of motion.  There are no impingement signs at either shoulder.  Her grasp strength seems strong and symmetrical to her bilaterally.    Assessment: Degenerative disc and joint disease of the cervical spine    Plan: We discussed conservative options including massage, heating pad, heat alternating with ice, avoiding excessive neck extension, physical therapy.  We discussed pain medicine options such as trying a different nonsteroidal anti-inflammatory, trying a muscle relaxant to assist with sleep at night.  She declined the need for additional medicines.  She plans on following through with physical therapy and she will notify me if she is not making improvements over the next 4 weeks.  She knows the next option if not improving could include an MRI of the cervical spine, and if necessary facet injections could be considered.  She had no further questions.        Mir Gresham MD

## 2020-12-02 NOTE — TELEPHONE ENCOUNTER
JACK for her to call back.  I want to refer her to ortho for the XR findings.  I am at [facility] today if she calls back.  I will talk to her.  You can call [facility] or message me on [employer].      Dayanna Higgins RN, CNP

## 2020-12-04 ENCOUNTER — THERAPY VISIT (OUTPATIENT)
Dept: PHYSICAL THERAPY | Facility: CLINIC | Age: 60
End: 2020-12-04
Payer: COMMERCIAL

## 2020-12-04 DIAGNOSIS — M54.2 NECK PAIN: ICD-10-CM

## 2020-12-04 PROCEDURE — 97161 PT EVAL LOW COMPLEX 20 MIN: CPT | Mod: GP | Performed by: PHYSICAL THERAPIST

## 2020-12-04 PROCEDURE — 97530 THERAPEUTIC ACTIVITIES: CPT | Mod: GP | Performed by: PHYSICAL THERAPIST

## 2020-12-04 NOTE — PROGRESS NOTES
Physical Therapy Initial Examination/Evaluation  December 4, 2020    Ruthann Beck is a 60 year old female referred to physical therapy by Dayanna Higgins CNP for treatment of L shoulder/neck with Precautions/Restrictions/MD instructions E&T    Therapist Impression:   Ruthann presents with L levator pain.  We initiated postural exercises including shrugging to address her L depressed scapula.    NEXT: taping and TPR    Subjective:  DOI/onset: 10/20/2020 DOS: none  Acute Injury or Gradual Onset?: Gradual injury over time  Mechanism of Injury: Working on ceiling  Related PMH: none Previous Treatment: Rest, Heat, Ibuprofen and muscle relaxer Effect of prior treatment: poor  Imaging: x-ray  Chief Complaint/Functional Limitations:   Pain as day progresses, pain with reading and see below in therapy evaluation codes   Pain: rest 0 /10, activity 5/10 Location: L UT, base of skull Frequency: Intermittent Described as: Nailing into base of skull Alleviated by: Rest Progression of Symptoms: Unchanged Time of day when pain is worse: Afternoon, Evening, time of day  Sleeping: Interrupted but not due to issue being seen for   Occupation: [employer]  Job duties: lifting/carrying  Current HEP/exercise regimen: NA  Patient's goals are see chief complaints     Other pertinent PMH/Red Flags: None   Barriers at home/work: None as reported by patient  Pertinent Surgical History: Cryosurgery 1980, Rt ankle fracture 1980  Medications: CBD, CBN, ibuprofen  General health as reported by patient: good  Return to MD:  prn    SHOULDER EXAMINATION  Cervical Range of Motion (measured in % restriction)  Flexion: wnl  Extension: wnl  Sidebend Left: 50  Right: 25 pain  Rotation Left: wnl  Right: wnl    STATIC POSTURE  Forward head: mild   Rounded shoulders:mild  Shoulder internally rotated: mild   Visual inspection: Depressed scapula L    SHOULDER RANGE OF MOTION  AROM Flexion Abduction ER Base Ext/IR or hand behind back angle   Left wnl  wnl  Ant Drift: wnl wnl wnl   Right wnl wnl  Ant Drift: wnl wnl wnl   Pain: none    SHOULDER STRENGTH  MMT Flexion Scaption ER IR   Left 5/5 5/5 5-/5 5/5   Right 5/5 5/5 5-/5 5/5     PALPATION  Left: Moderate tenderness to palpation at L levator scapulae  Right: Mild tenderness to palpation at R levator scapulae    Assessment/Plan:  Patient is a 60 year old female with cervical and left side shoulder complaints.    Patient has the following significant findings with corresponding treatment plan.                Diagnosis 1:  L shoulder/neck pain  Pain -  hot/cold therapy, manual therapy, splint/taping/bracing/orthotics, self management, education and home program  Decreased ROM/flexibility - manual therapy and therapeutic exercise  Decreased strength - therapeutic exercise and therapeutic activities  Impaired muscle performance - neuro re-education  Impaired posture - neuro re-education    Therapy Evaluation Codes:   1) History comprised of:   Personal factors that impact the plan of care:      None.    Comorbidity factors that impact the plan of care are:      None.     Medications impacting care: None.  2) Examination of Body Systems comprised of:   Body structures and functions that impact the plan of care:      Cervical spine and Shoulder.   Activity limitations that impact the plan of care are:      Reading/Computer work and Sitting.  3) Clinical presentation characteristics are:   Stable/Uncomplicated.  4) Decision-Making    Low complexity using standardized patient assessment instrument and/or measureable assessment of functional outcome.  Cumulative Therapy Evaluation is: Low complexity.    Previous and current functional limitations:  (See Goal Flow Sheet for this information)    Short term and Long term goals: (See Goal Flow Sheet for this information)     Communication ability:  Patient appears to be able to clearly communicate and understand verbal and written communication and follow directions  correctly.  Treatment Explanation - The following has been discussed with the patient:   RX ordered/plan of care  Anticipated outcomes  Possible risks and side effects  This patient would benefit from PT intervention to resume normal activities.   Rehab potential is good.    Frequency:  1 X week, once daily  Duration:  for 8 weeks  Discharge Plan:  Achieve all LTG.  Independent in home treatment program.  Reach maximal therapeutic benefit.    Please refer to the daily flowsheet for treatment today, total treatment time and time spent performing 1:1 timed codes.

## 2021-01-03 ENCOUNTER — OFFICE VISIT (OUTPATIENT)
Dept: URGENT CARE | Facility: URGENT CARE | Age: 61
End: 2021-01-03
Payer: COMMERCIAL

## 2021-01-03 ENCOUNTER — VIRTUAL VISIT (OUTPATIENT)
Dept: FAMILY MEDICINE | Facility: OTHER | Age: 61
End: 2021-01-03

## 2021-01-03 VITALS
TEMPERATURE: 98.3 F | WEIGHT: 160 LBS | SYSTOLIC BLOOD PRESSURE: 105 MMHG | OXYGEN SATURATION: 97 % | BODY MASS INDEX: 26.66 KG/M2 | DIASTOLIC BLOOD PRESSURE: 69 MMHG | HEIGHT: 65 IN | HEART RATE: 58 BPM

## 2021-01-03 DIAGNOSIS — Z87.2 PERSONAL HISTORY OF ACTINIC KERATOSIS: ICD-10-CM

## 2021-01-03 DIAGNOSIS — L30.9 ECZEMA, UNSPECIFIED TYPE: Primary | ICD-10-CM

## 2021-01-03 DIAGNOSIS — L30.9 DERMATITIS: ICD-10-CM

## 2021-01-03 LAB
KOH PREP SPEC: NORMAL
SPECIMEN SOURCE: NORMAL

## 2021-01-03 PROCEDURE — 87220 TISSUE EXAM FOR FUNGI: CPT | Performed by: INTERNAL MEDICINE

## 2021-01-03 PROCEDURE — 99213 OFFICE O/P EST LOW 20 MIN: CPT | Performed by: INTERNAL MEDICINE

## 2021-01-03 ASSESSMENT — MIFFLIN-ST. JEOR: SCORE: 1301.64

## 2021-01-03 NOTE — PROGRESS NOTES
ASSESSMENT:      ICD-10-CM    1. Eczema, unspecified type  L30.9 DERMATOLOGY ADULT REFERRAL     KOH prep (skin, hair or nails only)   2. Personal history of actinic keratosis  Z87.2 DERMATOLOGY ADULT REFERRAL     KOH prep (skin, hair or nails only)   3. Dermatitis  L30.9 DERMATOLOGY ADULT REFERRAL     KOH prep (skin, hair or nails only)        Medical Decision Making:  Discussed most likely eczema washing the winter.  Patient does not feel she washes her hands frequently  Discussed palm of hand is unusual place to have actinic keratoses     has nail fungus of the hands    Differential Diagnosis: Nummular eczema handwashing dermatitis, tinea, actinic keratoses      PLAN:  Discussed conservative therapy for treatment of eczema/dermatitis  If not improving, she will see dermatology for evaluation of actinic keratoses/precancer      Patient Instructions     Looks like nummular eczema   Has mometasone to apply 2 x day  Apply Vaseline after hand washing    KOH to screen for fungus is negative     If not improved, see Dermatology with History actinic keratosis   Referral done          SUBJECTIVE:   Ruthann Beck is a 60 year old female presenting with a chief complaint of   Chief Complaint   Patient presents with     Urgent Care     Pt in clinic to have eval for suspicious spot on left hand.     Derm Problem         She is an established patient of [facility].        Onset of symptoms was 2 week(s) ago.  Location: left palm of hand  Context: hx Act Keratosis, frozen off  Worried about AK     Treatment measures tried include: nothing    Fhx - daughter with eczema   with fungus in hand nails    Review of Systems    Past Medical History:   Diagnosis Date     NO ACTIVE PROBLEMS      Family History   Problem Relation Age of Onset     Depression Mother         manic depressive     Hypertension Father      Cardiovascular Father         stroke  at age 71     Arthritis Father         gout     Diabetes  "Maternal Grandmother      Breast Cancer Paternal Grandmother      Family History Negative Sister      Family History Negative Sister      Family History Negative Sister      Family History Negative Brother      Family History Negative Brother      Current Outpatient Medications   Medication Sig Dispense Refill     tiZANidine (ZANAFLEX) 4 MG capsule Take 1 capsule (4 mg) by mouth 3 times daily (Patient not taking: Reported on 1/3/2021) 20 capsule 0     Social History     Tobacco Use     Smoking status: Never Smoker     Smokeless tobacco: Never Used   Substance Use Topics     Alcohol use: Yes     Comment: 1 beer or glass of wine a day       OBJECTIVE  /69   Pulse 58   Temp 98.3  F (36.8  C) (Oral)   Ht 1.651 m (5' 5\")   Wt 72.6 kg (160 lb)   LMP 04/05/2010   SpO2 97%   BMI 26.63 kg/m      Physical Exam  Vitals signs reviewed.   Constitutional:       Appearance: Normal appearance.   Skin:     Comments: Dry red patchy skin base of left hand   Approximately three-quarter centimeter lesion     Neurological:      Mental Status: She is alert.                 Labs:  Results for orders placed or performed in visit on 01/03/21 (from the past 24 hour(s))   KOH prep (skin, hair or nails only)    Specimen: Skin   Result Value Ref Range    Specimen Description Skin     KOH Skin Hair Nails Test No fungal elements seen                    "

## 2021-01-03 NOTE — PROGRESS NOTES
"Date: 2021 11:44:55  Clinician: Kaykay Reinoso  Clinician NPI: [id number]  Patient: Ruthann Beck  Patient : 1960  Patient Address: [address] [place] [id number]  Patient Phone: [phone]  Visit Protocol: General skin conditions  Patient Summary:  Ruthann is a 60 year old ( : 1960 ) female who initiated a [facility] Visit for evaluation of an unspecified skin condition. When asked the question \"Please sign me up to receive news, health information and promotions from [facility].\", Ruthann responded \"No\".    Images of her skin condition were uploaded.  Her symptoms started 1-2 weeks ago and affect the left side of her body. The skin condition is located on her hands. The skin condition is white and red in color.   The affected area has dry/flaky skin and sores. It feels tender to touch and painful. The symptoms do not interfere with her sleep.   Symptom details     Redness: The redness has not rapidly increased in size.    Pain: The pain is mild (between 1-3 on a 10 point pain scale).     The skin condition has not changed since the symptoms started.   Denied symptoms include scabs, itchiness, warm to touch, pimples, numbness, blisters, drainage, hives, burning, and crusts. Ruthann does not feel feverish. She does not have a rash in the shape of a bull's-eye.   Treatments or home remedies used to relieve the symptoms as reported by the patient (free text): Hand lotion - no effect on patch   Precipitating events   Ruthann did not come in contact with any irritants prior to the onset of her symptoms and has not been in close contact with anyone that has similar symptoms. She also did not spend time in a wooded area, swim, travel, or spend excess time in the sun just before her symptoms started. Ruthann did not get bitten or stung by an insect.   Pertinent medical history  Ruthann has experienced this skin condition before. Her current skin condition does not come and go. The " last time she experienced this skin condition was more than a year ago.   Ruthann has had chickenpox, but has not had shingles in the past. She received a shingles vaccine.    Ruthann does not have a history or a family history of atopia. Ongoing medical conditions were denied.   Ruthann does not need a return to work/school note.   Weight: 155 lbs   Ruthann does not smoke or use smokeless tobacco.   Additional information as reported by the patient (free text): Looks like patch i had on my face 1-2 years ago that was frozen off at a dermatologist office   Weight: 155 lbs    MEDICATIONS: No current medications, ALLERGIES: NKDA  Clinician Response:  Dear Ruthann,   Your health is our priority. To determine the most appropriate care for you, I would like you to be seen in person to further discuss your health history and symptoms.  You will not be charged for this [facility] Visit. Thank you for trusting us with your care.   Diagnosis: Refer for additional evaluation  Diagnosis ICD: R69

## 2021-01-03 NOTE — PATIENT INSTRUCTIONS
Looks like nummular eczema   Has mometasone to apply 2 x day  Apply Vaseline after hand washing    KOH to screen for fungus is negative     If not improved, see Dermatology with History actinic keratosis   Referral done

## 2021-03-04 ENCOUNTER — MYC MEDICAL ADVICE (OUTPATIENT)
Dept: FAMILY MEDICINE | Facility: CLINIC | Age: 61
End: 2021-03-04

## 2021-03-04 DIAGNOSIS — R20.0 NUMBNESS OF RIGHT HAND: ICD-10-CM

## 2021-03-04 DIAGNOSIS — M79.601 PAIN OF RIGHT UPPER EXTREMITY: Primary | ICD-10-CM

## 2021-03-04 NOTE — TELEPHONE ENCOUNTER
I have not seen her recently.  I think it would be OK to do sports med for further evaluation due to duration of her symptoms.  Please sign ortho  referral and notify patient.

## 2021-03-04 NOTE — TELEPHONE ENCOUNTER
Ortho sports medicine referral ordered.    Writer responded via [facility].  TOSHA OliveiraN, RN  [facility]

## 2021-03-04 NOTE — TELEPHONE ENCOUNTER
Dr mahoney,    PLease see her sx and advise if you want to give her a referral or see her in virtual visit first?    Cortney Mclean, RN, BSN  [facility]

## 2021-03-08 NOTE — TELEPHONE ENCOUNTER
Pain of right upper extremity; Numbness of right hand/Dionisio Spain MD/P1/[facility]   APPOINTMENT DATE: 3/9   NOTES STATUS DETAILS   OFFICE NOTE from referring provider Internal    OFFICE NOTE from other specialist     DISCHARGE SUMMARY from hospital     DISCHARGE REPORT from the ER     OPERATIVE REPORT     EMG report     MEDICATION LIST internal    MRI     DEXA (osteoporosis/bone health)     CT SCAN     XRAYS (IMAGES & REPORTS) Internal CERVICAL SPINE THREE VIEWS  11/30/2020 12:27 PM

## 2021-03-09 ENCOUNTER — OFFICE VISIT (OUTPATIENT)
Dept: ORTHOPEDICS | Facility: CLINIC | Age: 61
End: 2021-03-09
Attending: FAMILY MEDICINE
Payer: COMMERCIAL

## 2021-03-09 ENCOUNTER — PRE VISIT (OUTPATIENT)
Dept: ORTHOPEDICS | Facility: CLINIC | Age: 61
End: 2021-03-09

## 2021-03-09 VITALS — BODY MASS INDEX: 26.66 KG/M2 | WEIGHT: 160 LBS | HEIGHT: 65 IN

## 2021-03-09 DIAGNOSIS — M79.601 PAIN OF RIGHT UPPER EXTREMITY: ICD-10-CM

## 2021-03-09 DIAGNOSIS — R20.0 NUMBNESS OF RIGHT HAND: ICD-10-CM

## 2021-03-09 PROCEDURE — 99213 OFFICE O/P EST LOW 20 MIN: CPT | Performed by: FAMILY MEDICINE

## 2021-03-09 ASSESSMENT — MIFFLIN-ST. JEOR: SCORE: 1301.64

## 2021-03-09 NOTE — PROGRESS NOTES
[facility]  SPORTS & ORTHOPEDIC CLINIC VISIT     Mar 9, 2021        ASSESSMENT & PLAN    60-year-old female with right upper extremity pain and paresthesias that is likely cervical radiculopathy.  Possibly C8 based on exam today    Reviewed imaging and assessment with patient in detail  Discussed options for treatment which include PT, steroid burst, muscle relaxant, MRI  At this time patient like to pursue a subsequent course physical therapy.  She does have a prescription for tizanidine and will call us if she needs a refill  She would like to defer steroid course at this time but may contact us later if she reconsiders.  If she has no improvement we would consider MRI of the cervical spine  We also discussed an EMG, given her chronic hand paresthesias and now in the context of a likely cervical radiculopathy.  Given the acuity of her cervical symptoms do not feel that this would be the best test at the current time however will absolutely reconsider in the future.    Richie Lyles MD  [facility] SPORTS MEDICINE [facility]    -----  Chief Complaint   Patient presents with     Neck - Pain       SUBJECTIVE  Ruthann Cammy Beck is a/an 60 year old female who is seen as a self referral for evaluation of  Cervical pain.     The patient is seen by themselves.  The patient is Right handed    Onset: 1 month(s) ago. Patient describes injury as R shoulder pain region. Started having numbness in the right hand that is worsening. Has had some numbness in right hand for years. Thought it was carpal tunnel, and tried a brace but it did not help.   Location of Pain: right arm and hand.   Worsened by: Sleeping, night time.   Better with: Standing up and walking around for a while   Treatments tried: ibuprofen and casting/splinting/bracing  Associated symptoms: numbness and tingling    Orthopedic/Surgical history: NO  Social History/Occupation: Unemployed rn     Reviewed previous  "visit notes from 12/2/2020 when she saw Dr. Gresham who suspected that her symptoms were related to a cervical radiculopathy.  At that time it was recommended to pursue treatment with physical therapy which she has done once and has continued to do some home exercises.  It was discussed the neck step would likely be MRI of the cervical spine at that time.  However, patient states that her symptoms at this time are much different than the symptoms she was having at the end of November and in December which was mostly in her neck and left-sided.    REVIEW OF SYSTEMS:    Do you have fever, chills, weight loss? No    Do you have any vision problems? No    Do you have any chest pain or edema? No    Do you have any shortness of breath or wheezing?  No    Do you have stomach problems? No    Do you have any numbness or focal weakness? No    Do you have diabetes? No    Do you have problems with bleeding or clotting? No    Do you have an rashes or other skin lesions? No    OBJECTIVE:  Ht 1.651 m (5' 5\")   Wt 72.6 kg (160 lb)   LMP 04/05/2010   BMI 26.63 kg/m       General: alert, pleasant, no distress  Neck/Spine: no TTP of spinous processes in cervical spine. Full ROM with flexion, extension, rotation, tilting  without pain. negative TTP along paraspinous muscles and upper trapezius musculature. negative Periscapular pain.  negative tightness in this area. No noted bruising or skin discoloration. Spurlings negative  Neurologic: Patient reports reduced sensation to light touch over the palm of the right hand compared to contralateral side which seems to be more profound over the ring and small fingers.  Otherwise SILT throughout upper extremities. Strength 5/5 and symmetric throughout upper extremities.   Upper Extremities: warm, well perfused, no edema. Full ROM without pain in shoulder, elbow, wrist, and hand. Good capillary refill bilaterally      RADIOLOGY:    Independently reviewed previous x-rays of the cervical spine " dated 11/30/2020 demonstrating multilevel degenerative disease particularly C4-C6.  Loss of normal cervical lordosis.  See EMR for formal radiology report.

## 2021-03-10 ENCOUNTER — THERAPY VISIT (OUTPATIENT)
Dept: PHYSICAL THERAPY | Facility: CLINIC | Age: 61
End: 2021-03-10
Attending: FAMILY MEDICINE
Payer: COMMERCIAL

## 2021-03-10 DIAGNOSIS — M79.601 PAIN OF RIGHT UPPER EXTREMITY: ICD-10-CM

## 2021-03-10 DIAGNOSIS — M54.2 NECK PAIN: ICD-10-CM

## 2021-03-10 DIAGNOSIS — R20.0 NUMBNESS OF RIGHT HAND: ICD-10-CM

## 2021-03-10 PROCEDURE — 97110 THERAPEUTIC EXERCISES: CPT | Mod: GP | Performed by: PHYSICAL THERAPIST

## 2021-03-10 PROCEDURE — 97161 PT EVAL LOW COMPLEX 20 MIN: CPT | Mod: GP | Performed by: PHYSICAL THERAPIST

## 2021-03-10 PROCEDURE — 97530 THERAPEUTIC ACTIVITIES: CPT | Mod: GP | Performed by: PHYSICAL THERAPIST

## 2021-03-10 NOTE — PROGRESS NOTES
"Physical Therapy Initial Examination/Evaluation  March 10, 2021    Ruthann Beck is a 60 year old female referred to physical therapy by Richie Lyles MD for treatment of cervical pain with Precautions/Restrictions/MD instructions eval and treat    Therapist Impression:   Patient presents with nerve involvement with decreased Median length on R compared to L, tingling/pain with EXT/IR up the back with R UE. Patient has limited L SB cervical ROM and pain/weakness with R shoulder abduction with a resting R depressed and protracted scapula. Patient given chin tucks, posture adjustment shoulders up and back, and median nerve glides with education to stop exercises if symptoms worsen.     Subjective:  DOI/onset: 6 weeks ago DOS: na  Acute Injury or Gradual Onset?: Gradual injury over time  Mechanism of Injury: unknown  Related PMH: na Previous Treatment: muscle relaxants Effect of prior treatment: poor  Imaging: x-ray  Chief Complaint/Functional Limitations:  Disturbed sleep and see below in therapy evaluation codes   Pain: rest 3 /10, numbness activity 8/10 Location: pins and needles and pain down lateral arm to fingers Frequency: intermittent, worse at night Progression of Symptoms: Gradually getting worse. Time of day when pain is worse: Evening  Sleeping: Losing 3-5 hours of sleep per night   Occupation: [employer]  Job duties: prolonged standing, lifting/carrying  Current HEP/exercise regimen: walk dog, core exercises  Patient's goals are see chief complaints \"I want to be able to sleep without waking up\"    Other pertinent PMH/Red Flags: Numbness/Tingling, Pain at Night/Rest   Barriers at home/work: None as reported by patient  Pertinent Surgical History: ankle, cervix  Medications: None as reported by patient  General health as reported by patient: good  Return to MD:  prn    Cervical Spine Evaluation    Posture  Forward Head: mild  Rounded Shoulders: mild  Thoracic Spine: decreased " kyphosis  Scapula Assessment: R depressed, protracted, decreased synchronization with overhead movement    Range of Motion (measured in % restriction)  Flexion: WNL  Extension: WNL  Sidebend Left: 10% less Right: WNL  Rotation Left: WNL  Right: WNL    Upper Extremity  ROM  Pain/tingling with up the back Ext/IR on R    Strength  Shoulder MMT Flexion ABD ER IR   Left 5-/5 5-/5 5-/5 5-/5   Right 5-/5 4-/5 painful 5-/5 5-/5     Special Tests  Spurling's: Negative, stated this feels like a release  Dermatomes: Negative   Myotomes: Negative   Neural tension: radial normal, ulnar normal, moderately limited median R compared to L      Palpation  Mild tenderness to palpation at Infraspinatus TP    Assessment/Plan:  Patient is a 60 year old female with cervical complaints.    Patient has the following significant findings with corresponding treatment plan.                Diagnosis 1:    Pain -  hot/cold therapy, mechanical traction, manual therapy and splint/taping/bracing/orthotics  Diagnosis 2:    Impaired posture - neuro re-education, therapeutic activities and home program  Diagnosis 3:    Impaired function - therapeutic activities, home program, therapeutic exercise    Therapy Evaluation Codes:   1) History comprised of:   Personal factors that impact the plan of care:      None.    Comorbidity factors that impact the plan of care are:      None.     Medications impacting care: na.  2) Examination of Body Systems comprised of:   Body structures and functions that impact the plan of care:      Cervical spine.   Activity limitations that impact the plan of care are:      Working and Sleeping.  3) Clinical presentation characteristics are:   Stable/Uncomplicated.  4) Decision-Making    Low complexity using standardized patient assessment instrument and/or measureable assessment of functional outcome.  Cumulative Therapy Evaluation is: Low complexity.    Previous and current functional limitations:  (See Goal Flow Sheet for  this information)    Short term and Long term goals: (See Goal Flow Sheet for this information)     Communication ability:  Patient appears to be able to clearly communicate and understand verbal and written communication and follow directions correctly.  Treatment Explanation - The following has been discussed with the patient:   RX ordered/plan of care  Anticipated outcomes  Possible risks and side effects  This patient would benefit from PT intervention to resume normal activities.   Rehab potential is good.    Frequency:  2 X week, once daily  Duration:  for 2 weeks tapering to 1 X a week over 4 weeks  Discharge Plan:  Achieve all LTG.  Independent in home treatment program.  Reach maximal therapeutic benefit.    Please refer to the daily flowsheet for treatment today, total treatment time and time spent performing 1:1 timed codes.     .

## 2021-03-24 ENCOUNTER — THERAPY VISIT (OUTPATIENT)
Dept: PHYSICAL THERAPY | Facility: CLINIC | Age: 61
End: 2021-03-24
Payer: COMMERCIAL

## 2021-03-24 DIAGNOSIS — R20.0 NUMBNESS OF RIGHT HAND: ICD-10-CM

## 2021-03-24 DIAGNOSIS — M79.601 PAIN OF RIGHT UPPER EXTREMITY: ICD-10-CM

## 2021-03-24 PROBLEM — M54.2 NECK PAIN: Status: RESOLVED | Noted: 2020-12-04 | Resolved: 2021-03-24

## 2021-03-24 PROCEDURE — 97530 THERAPEUTIC ACTIVITIES: CPT | Mod: GP | Performed by: PHYSICAL THERAPIST

## 2021-03-24 PROCEDURE — 97110 THERAPEUTIC EXERCISES: CPT | Mod: GP | Performed by: PHYSICAL THERAPIST

## 2021-03-24 NOTE — PROGRESS NOTES
Physical Therapy Initial Evaluation  Subjective:  HPI                    Objective:  System    Physical Exam    General     ROS

## 2021-04-12 ENCOUNTER — THERAPY VISIT (OUTPATIENT)
Dept: PHYSICAL THERAPY | Facility: CLINIC | Age: 61
End: 2021-04-12
Payer: COMMERCIAL

## 2021-04-12 DIAGNOSIS — R20.0 NUMBNESS OF RIGHT HAND: ICD-10-CM

## 2021-04-12 DIAGNOSIS — M79.601 PAIN OF RIGHT UPPER EXTREMITY: ICD-10-CM

## 2021-04-12 PROCEDURE — 97530 THERAPEUTIC ACTIVITIES: CPT | Mod: GP | Performed by: PHYSICAL THERAPIST

## 2021-04-12 PROCEDURE — 97110 THERAPEUTIC EXERCISES: CPT | Mod: GP | Performed by: PHYSICAL THERAPIST

## 2021-04-12 PROCEDURE — 97012 MECHANICAL TRACTION THERAPY: CPT | Mod: GP | Performed by: PHYSICAL THERAPIST

## 2021-04-12 NOTE — PROGRESS NOTES
Therapist Impression:   Patient has decreased symptoms since last visit. She did benefit from traction and manual therapy last session.  Performed traction today and can consider home unit if needed.  Progressed chin tucks to extension and added strengthening to prepare for swimming.     GOALS: Swimming    NEXT: Neck disability, chin tuck progressions as able    PTRX: printed    Subjective:  Patient reports she is sleeping a lot better, still has difficulty doing house projects due to pain. She felt a lot of relief post the traction trial last PT session.     Objective:  Decreased difficulty with chin tuck extension as reps went on

## 2021-07-29 ENCOUNTER — OFFICE VISIT (OUTPATIENT)
Dept: FAMILY MEDICINE | Facility: CLINIC | Age: 61
End: 2021-07-29
Payer: COMMERCIAL

## 2021-07-29 ENCOUNTER — OFFICE VISIT (OUTPATIENT)
Dept: DERMATOLOGY | Facility: CLINIC | Age: 61
End: 2021-07-29
Payer: COMMERCIAL

## 2021-07-29 VITALS
SYSTOLIC BLOOD PRESSURE: 114 MMHG | RESPIRATION RATE: 16 BRPM | TEMPERATURE: 97.8 F | DIASTOLIC BLOOD PRESSURE: 81 MMHG | WEIGHT: 159 LBS | OXYGEN SATURATION: 97 % | HEART RATE: 76 BPM | BODY MASS INDEX: 26.46 KG/M2

## 2021-07-29 VITALS — SYSTOLIC BLOOD PRESSURE: 120 MMHG | OXYGEN SATURATION: 97 % | HEART RATE: 77 BPM | DIASTOLIC BLOOD PRESSURE: 79 MMHG

## 2021-07-29 DIAGNOSIS — D48.5 NEOPLASM OF UNCERTAIN BEHAVIOR OF SKIN: ICD-10-CM

## 2021-07-29 DIAGNOSIS — L81.4 LENTIGO: ICD-10-CM

## 2021-07-29 DIAGNOSIS — D18.01 ANGIOMA OF SKIN: ICD-10-CM

## 2021-07-29 DIAGNOSIS — D22.9 NEVUS: Primary | ICD-10-CM

## 2021-07-29 DIAGNOSIS — D22.5 MELANOCYTIC NEVUS OF TRUNK: Primary | ICD-10-CM

## 2021-07-29 DIAGNOSIS — G47.00 INSOMNIA, UNSPECIFIED TYPE: ICD-10-CM

## 2021-07-29 DIAGNOSIS — L82.1 SEBORRHEIC KERATOSIS: ICD-10-CM

## 2021-07-29 PROCEDURE — 11102 TANGNTL BX SKIN SINGLE LES: CPT | Performed by: PHYSICIAN ASSISTANT

## 2021-07-29 PROCEDURE — 99203 OFFICE O/P NEW LOW 30 MIN: CPT | Mod: 25 | Performed by: PHYSICIAN ASSISTANT

## 2021-07-29 PROCEDURE — 88305 TISSUE EXAM BY PATHOLOGIST: CPT | Performed by: DERMATOLOGY

## 2021-07-29 PROCEDURE — 99213 OFFICE O/P EST LOW 20 MIN: CPT | Performed by: FAMILY MEDICINE

## 2021-07-29 ASSESSMENT — PATIENT HEALTH QUESTIONNAIRE - PHQ9: SUM OF ALL RESPONSES TO PHQ QUESTIONS 1-9: 15

## 2021-07-29 NOTE — PROGRESS NOTES
Assessment & Plan     Melanocytic nevus of trunk  -Will refer to dermatology for skin check  - Adult Dermatology Referral    Insomnia, unspecified type  -Has tried trazodone and amitriptyline in the past  -Patient is interested in medical cannabis, did not have information readily available for patient regarding this. Will send her link to [facility] medical cannabis program for patient to review.   -Patient upset that I could not offer her more insight on the medical cannabis program requested to fill out PHQ9 and have it documented in her chart.       Depression Screening Follow Up    PHQ 7/29/2021   PHQ-9 Total Score 15   Q9: Thoughts of better off dead/self-harm past 2 weeks Not at all       Adriel Montana DO  [facility]    Antionette Beavers is a 61 year old who presents for the following health issues:    HPI     Patient here for mole on her stomach that she noticed about 3 weeks ago. Notes that the skin mole has irregular borders and pigmentation. It has not changed since she first noticed it. Endorses history of sun exposure when she was younger. History of AK on face that was treated with cryotherapy in the past.    Concern - Spot on stomach   Onset: x 3 weeks   Description:   Intensity: mild  Progression of Symptoms:  same  Accompanying Signs & Symptoms: none   Previous history of similar problem: no  Therapies tried and outcome: None    Patient with new job and works odd hours. Has difficulty falling asleep. Endorses long history of insomnia. According to our records she has tried trazodone and amitriptyline in the past without effect. Purchased THC product from [place] and has been using it to help with sleep. Wondering what the guidelines are for medical cannabis in [place].     Review of Systems   CONSTITUTIONAL: NEGATIVE for fever, chills, change in weight      Objective    /81 (BP Location: Left arm, Patient Position: Sitting, Cuff Size:  Adult Regular)   Pulse 76   Temp 97.8  F (36.6  C) (Tympanic)   Resp 16   Wt 72.1 kg (159 lb)   LMP 04/05/2010   SpO2 97%   BMI 26.46 kg/m    Body mass index is 26.46 kg/m .  Physical Exam   GENERAL: healthy, alert and no distress  SKIN: about 1-2 mm flat, brown macule over left side of abdomen, lighter irregular border.

## 2021-07-29 NOTE — PROGRESS NOTES
HPI:   Chief complaints: Ruthann Beck is a pleasant 61 year old female who presents for Full skin cancer screening to rule out skin cancer   Last Skin Exam: n/a      1st Baseline: yes  Personal HX of Skin Cancer: no   Personal HX of Malignant Melanoma: no   Family HX of Skin Cancer / Malignant Melanoma: no  Personal HX of Atypical Moles:   no  Risk factors: history of sun exposure and burns  New / Changing lesions:yes new mole on the left side of the abdomen she noticed about 3 weeks ago  Social History: works as a baker; does bread mostly  On review of systems, there are no further skin complaints, patient is feeling otherwise well.   ROS of the following were done and are negative: Constitutional, Eyes, Ears, Nose,   Mouth, Throat, Cardiovascular, Respiratory, GI, Genitourinary, Musculoskeletal,   Psychiatric, Endocrine, Allergic/Immunologic.    PHYSICAL EXAM:   /79 (BP Location: Left arm, Patient Position: Sitting, Cuff Size: Adult Regular)   Pulse 77   LMP 04/05/2010   SpO2 97%   Skin exam performed as follows: Type 2 skin. Mood appropriate  Alert and Oriented X 3. Well developed, well nourished in no distress.  General appearance: Normal  Head including face: Normal  Eyes: conjunctiva and lids: Normal  Mouth: Lips, teeth, gums: Normal  Neck: Normal  Chest-breast/axillae: Normal  Back: Normal  Spleen and liver: Normal  Cardiovascular: Exam of peripheral vascular system by observation for swelling, varicosities, edema: Normal  Genitalia: groin, buttocks: Normal  Extremities: digits/nails (clubbing): Normal  Eccrine and Apocrine glands: Normal  Right upper extremity: Normal  Left upper extremity: Normal  Right lower extremity: Normal  Left lower extremity: Normal  Skin: Scalp and body hair: See below    Pt deferred exam of breasts, groin, buttocks: No    Other physical findings:  1. Multiple pigmented macules on extremities and trunk  2. Multiple pigmented macules on face, trunk and  extremities  3. Multiple vascular papules on trunk, arms and legs  4. Multiple scattered keratotic plaques  5. 3 mm irregular light brown macule on the left abdomen       Except as noted above, no other signs of skin cancer or melanoma.     ASSESSMENT/PLAN:   Benign Full skin cancer screening today. . Patient with history of none  Advised on monthly self exams and 1 year  Patient Education: Appropriate brochures given.    1. Multiple benign appearing nevi on arms, legs and trunk. Discussed ABCDEs of melanoma and sunscreen.   2. Multiple lentigos on arms, legs and trunk. Advised benign, no treatment needed.  3. Multiple scattered angiomas. Advised benign, no treatment needed.   4. Seborrheic keratosis on arms, legs and trunk. Advised benign, no treatment needed.  5. Atypical nevus on the left abdomen. Shave biopsy performed.  Area cleaned and anesthetized with 1% lidocaine with epinephrine.  Dermablade used to remove the lesion and sent to pathology. Bleeding was cauterized. Pt tolerated procedure well with no complications.               Follow-up: yearly FSE/PRN sooner    1.) Patient was asked about new and changing moles. YES  2.) Patient received a complete physical skin examination: YES  3.) Patient was counseled to perform a monthly self skin examination: YES  Scribed By: Brooklyn Curtis MS, PA-C

## 2021-07-29 NOTE — NURSING NOTE
"Initial /79 (BP Location: Left arm, Patient Position: Sitting, Cuff Size: Adult Regular)   Pulse 77   LMP 04/05/2010   SpO2 97%  Estimated body mass index is 26.46 kg/m  as calculated from the following:    Height as of 3/9/21: 1.651 m (5' 5\").    Weight as of an earlier encounter on 7/29/21: 72.1 kg (159 lb). .      "

## 2021-07-29 NOTE — PATIENT INSTRUCTIONS
Wound Care Instructions     FOR SUPERFICIAL WOUNDS                    AFTER 24 HOURS YOU SHOULD REMOVE THE BANDAGE AND BEGIN DAILY DRESSING CHANGES AS FOLLOWS:     1) Remove Dressing.     2) Clean and dry the area with tap water using a Q-tip or sterile gauze pad.     3) Apply Vaseline, Aquaphor, Polysporin ointment or Bacitracin ointment over entire wound.  Do NOT use Neosporin ointment.     4) Cover the wound with a band-aid, or a sterile non-stick gauze pad and micropore paper tape    REPEAT THESE INSTRUCTIONS AT LEAST ONCE A DAY UNTIL THE WOUND HAS COMPLETELY HEALED.    It is an old wives tale that a wound heals better when it is exposed to air and allowed to dry out. The wound will heal faster with a better cosmetic result if it is kept moist with ointment and covered with a bandage.    **Do not let the wound dry out.**    Supplies Needed:      *Cotton tipped applicators (Q-tips)    *Vaseline, Aquaphor, Polysporin or Bacitracin Ointment (NOT NEOSPORIN)    *Band-aids or non-stick gauze pads and micropore paper tape.      PATIENT INFORMATION:    During the healing process you will notice a number of changes. All wounds develop a small halo of redness surrounding the wound.  This means healing is occurring. Severe itching with extensive redness usually indicates sensitivity to the ointment or bandage tape used to dress the wound.  You should call our office if this develops.      Swelling  and/or discoloration around your surgical site is common, particularly when performed around the eye.    All wounds normally drain.  The larger the wound the more drainage there will be.  After 7-10 days, you will notice the wound beginning to shrink and new skin will begin to grow.  The wound is healed when you can see skin has formed over the entire area.  A healed wound has a healthy, shiny look to the surface and is red to dark pink in color to normalize.  Wounds may take approximately 4-6 weeks to heal.  Larger wounds may  take 6-8 weeks.  After the wound is healed you may discontinue dressing changes.    You may experience a sensation of tightness as your wound heals. This is normal and will gradually subside.    Your healed wound may be sensitive to temperature changes. This sensitivity improves with time, but if you re having a lot of discomfort, try to avoid temperature extremes.    Patients frequently experience itching after their wound appears to have healed because of the continue healing under the skin.  Plain Vaseline will help relieve the itching.      POSSIBLE COMPLICATIONS    BLEEDIN. Leave the bandage in place.  2. Use tightly rolled up gauze or a cloth to apply direct pressure over the bandage for 30  minutes.  3. Reapply pressure for an additional 30 minutes if necessary  4. Use additional gauze and tape to maintain pressure once the bleeding has stopped.

## 2021-07-29 NOTE — LETTER
7/29/2021         RE: Ruthann Beck  [address]  [facility] [address]        Dear Colleague,    Thank you for referring your patient, Ruthann Beck, to the [facility]. Please see a copy of my visit note below.    HPI:   Chief complaints: Ruthann Beck is a pleasant 61 year old female who presents for Full skin cancer screening to rule out skin cancer   Last Skin Exam: n/a      1st Baseline: yes  Personal HX of Skin Cancer: no   Personal HX of Malignant Melanoma: no   Family HX of Skin Cancer / Malignant Melanoma: no  Personal HX of Atypical Moles:   no  Risk factors: history of sun exposure and burns  New / Changing lesions:yes new mole on the left side of the abdomen she noticed about 3 weeks ago  Social History: works as a baker; does bread mostly  On review of systems, there are no further skin complaints, patient is feeling otherwise well.   ROS of the following were done and are negative: Constitutional, Eyes, Ears, Nose,   Mouth, Throat, Cardiovascular, Respiratory, GI, Genitourinary, Musculoskeletal,   Psychiatric, Endocrine, Allergic/Immunologic.    PHYSICAL EXAM:   /79 (BP Location: Left arm, Patient Position: Sitting, Cuff Size: Adult Regular)   Pulse 77   LMP 04/05/2010   SpO2 97%   Skin exam performed as follows: Type 2 skin. Mood appropriate  Alert and Oriented X 3. Well developed, well nourished in no distress.  General appearance: Normal  Head including face: Normal  Eyes: conjunctiva and lids: Normal  Mouth: Lips, teeth, gums: Normal  Neck: Normal  Chest-breast/axillae: Normal  Back: Normal  Spleen and liver: Normal  Cardiovascular: Exam of peripheral vascular system by observation for swelling, varicosities, edema: Normal  Genitalia: groin, buttocks: Normal  Extremities: digits/nails (clubbing): Normal  Eccrine and Apocrine glands: Normal  Right upper extremity: Normal  Left upper extremity: Normal  Right lower extremity:  Normal  Left lower extremity: Normal  Skin: Scalp and body hair: See below    Pt deferred exam of breasts, groin, buttocks: No    Other physical findings:  1. Multiple pigmented macules on extremities and trunk  2. Multiple pigmented macules on face, trunk and extremities  3. Multiple vascular papules on trunk, arms and legs  4. Multiple scattered keratotic plaques  5. 3 mm irregular light brown macule on the left abdomen       Except as noted above, no other signs of skin cancer or melanoma.     ASSESSMENT/PLAN:   Benign Full skin cancer screening today. . Patient with history of none  Advised on monthly self exams and 1 year  Patient Education: Appropriate brochures given.    1. Multiple benign appearing nevi on arms, legs and trunk. Discussed ABCDEs of melanoma and sunscreen.   2. Multiple lentigos on arms, legs and trunk. Advised benign, no treatment needed.  3. Multiple scattered angiomas. Advised benign, no treatment needed.   4. Seborrheic keratosis on arms, legs and trunk. Advised benign, no treatment needed.  5. Atypical nevus on the left abdomen. Shave biopsy performed.  Area cleaned and anesthetized with 1% lidocaine with epinephrine.  Dermablade used to remove the lesion and sent to pathology. Bleeding was cauterized. Pt tolerated procedure well with no complications.               Follow-up: yearly FSE/PRN sooner    1.) Patient was asked about new and changing moles. YES  2.) Patient received a complete physical skin examination: YES  3.) Patient was counseled to perform a monthly self skin examination: YES  Scribed By: Brooklyn Curtis MS, PAJO          Again, thank you for allowing me to participate in the care of your patient.        Sincerely,        Brooklyn Curtis PA-C

## 2021-07-30 LAB
PATH REPORT.COMMENTS IMP SPEC: NORMAL
PATH REPORT.COMMENTS IMP SPEC: NORMAL
PATH REPORT.FINAL DX SPEC: NORMAL
PATH REPORT.GROSS SPEC: NORMAL
PATH REPORT.MICROSCOPIC SPEC OTHER STN: NORMAL
PATH REPORT.RELEVANT HX SPEC: NORMAL

## 2021-09-05 ENCOUNTER — HEALTH MAINTENANCE LETTER (OUTPATIENT)
Age: 61
End: 2021-09-05

## 2021-09-10 ENCOUNTER — MYC MEDICAL ADVICE (OUTPATIENT)
Dept: FAMILY MEDICINE | Facility: CLINIC | Age: 61
End: 2021-09-10

## 2021-09-10 DIAGNOSIS — M79.641 PAIN OF RIGHT HAND: Primary | ICD-10-CM

## 2021-09-10 NOTE — TELEPHONE ENCOUNTER
Rosa Beck,    Per your message, I would recommend you come in for an office visit or requested for a virtual visit to discuss your symptoms with your provider. A referral can be given out if needed. Thank you and have a great day.      Dai Swann MA on 9/10/2021 at 8:43 AM

## 2021-09-14 ENCOUNTER — MYC MEDICAL ADVICE (OUTPATIENT)
Dept: FAMILY MEDICINE | Facility: CLINIC | Age: 61
End: 2021-09-14

## 2021-09-14 NOTE — TELEPHONE ENCOUNTER
Dr Bass - would you give reerral without an appointment?  Could patient do an e-visit?  Clare Senior RN  [facility]

## 2021-09-15 NOTE — TELEPHONE ENCOUNTER
See 9/10/21 MyChart encounter.    Writer responded via [employer]    SALLIE Oliveira, RN  [facility]

## 2021-09-16 NOTE — TELEPHONE ENCOUNTER
Symptoms could be due to carpal tunnel. Pt can try trial of over the counter wrist splint which can be worn at night.  Ortho referral signed  DM

## 2021-09-16 NOTE — TELEPHONE ENCOUNTER
My chart message as below sent to patient and included referral information.  Clare Senior RN  [facility]

## 2021-09-17 NOTE — TELEPHONE ENCOUNTER
Dr. Baldomero Carter signed your referral.  Ortho should be calling you to schedule an appt, if you do not hear from them by the end of today please call them to schedule an appt.     Thank you and have a great weekend.    Please be aware that coverage of these services is subject to the terms and limitations of your health insurance plan.  Call member services at your health plan with any benefit or coverage questions.  The [facility]  will call you to coordinate your care as prescribed by your provider. A representative will call you within 1 business day to help schedule your appointment, or you may contact the  Representative at: [phone]      Dia Swann MA on 9/17/2021 at 1:45 PM    Close encounter.    Dai Swann MA on 9/17/2021 at 1:45 PM

## 2021-09-27 ENCOUNTER — ANCILLARY PROCEDURE (OUTPATIENT)
Dept: GENERAL RADIOLOGY | Facility: CLINIC | Age: 61
End: 2021-09-27
Attending: FAMILY MEDICINE
Payer: COMMERCIAL

## 2021-09-27 ENCOUNTER — OFFICE VISIT (OUTPATIENT)
Dept: ORTHOPEDICS | Facility: CLINIC | Age: 61
End: 2021-09-27
Payer: COMMERCIAL

## 2021-09-27 DIAGNOSIS — M79.641 PAIN OF RIGHT HAND: ICD-10-CM

## 2021-09-27 PROCEDURE — 99214 OFFICE O/P EST MOD 30 MIN: CPT | Performed by: FAMILY MEDICINE

## 2021-09-27 PROCEDURE — 73130 X-RAY EXAM OF HAND: CPT | Mod: LT | Performed by: RADIOLOGY

## 2021-09-27 RX ORDER — CELECOXIB 100 MG/1
100 CAPSULE ORAL 2 TIMES DAILY
Qty: 100 CAPSULE | Refills: 1 | Status: SHIPPED | OUTPATIENT
Start: 2021-09-27 | End: 2023-08-23

## 2021-09-27 NOTE — PROGRESS NOTES
[facility]  SPORTS & ORTHOPEDIC CLINIC VISIT     Sep 27, 2021        ASSESSMENT & PLAN    61-year-old with bilateral hand pain and right upper extremity pain.  Her hand and finger pain is suspected to be related to osteoarthritis.  However, her right upper extremity symptoms are likely secondary to cervical nerve impingement.    Reviewed imaging and assessment with patient in detail  She was given a prescription for Celebrex to use in place ibuprofen as she has been using consistently and this may be less likely to cause systemic side effect.  She may also consider hand therapy  For her right upper extremity pain we would recommend a cervical MRI at this time given that she has been having persistent symptoms for 10 months and has been refractory to physical therapy, bracing, and NSAIDs.  She will follow-up virtually or in person after the MRI to discuss results.      Richie Lyles MD  [facility] SPORTS MEDICINE [facility]    -----  Chief Complaint   Patient presents with     Right Hand - Pain       SUBJECTIVE  Ruthann Beck is a/an 61 year old female who is seen in consultation at the request of Glory Alexandre MD for evaluation of  Right hand pain.     The patient is seen by themselves.  The patient is Right handed    Onset: 5 years(s) ago. Patient describes injury as having bilateral hand pain and numbness. When she wakes up in the AM she cannot bend her fingers, so she runs them under cold water and they will work better. At night time she wakes up with numbness and pins and needles in her right hand so she has to get up and sleep in a chair to relieve the symptoms. She did try a wrist brace and that made it worse.  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  Roughly 1 year ago she was having some right shoulder pain with radiation down the arm.  She had  cervical spine x-ray performed at that time and was suspected to have a pinched nerve.  The shoulder pain has improved however at nighttime she continues to have symptoms radiating down her right arm.  She does have some tingling and numbness in this distribution as well.  She has been using 400 mg ibuprofen nightly.  She has tried topical diclofenac which has been ineffective.      Orthopedic/Surgical history: NO  Social History/Occupation: Baker       REVIEW OF SYSTEMS:    Do you have fever, chills, weight loss? No    Do you have any vision problems? No    Do you have any chest pain or edema? No    Do you have any shortness of breath or wheezing?  No    Do you have stomach problems? No    Do you have any numbness or focal weakness? No    Do you have diabetes? No    Do you have problems with bleeding or clotting? No    Do you have an rashes or other skin lesions? No    OBJECTIVE:  [facility] 04/05/2010      General: alert, pleasant, no distress  Neck/Spine: no TTP of spinous processes in cervical spine. Full ROM with flexion, extension, rotation, tilting  without pain. negative TTP along paraspinous muscles and upper trapezius musculature. negative Periscapular pain.  negative tightness in this area. No noted bruising or skin discoloration. Spurlings negative  Neurologic: SILT throughout upper extremities. Strength 5/5 and symmetric throughout upper extremities.   Upper Extremities: warm, well perfused, no edema. Full ROM without pain in shoulder, elbow, wrist, and hand. Good capillary refill bilaterally    General  - alert, pleasant, no distress  CV  - normal radial pulse, cap refill brisk  Musculoskeletal -right hand  - inspection: Some enlargement of the IP joints diffusely.  No atrophy, normal joint alignment, no swelling  - palpation: no bony or soft tissue tenderness, no tenderness at the anatomical snuffbox  - ROM:  MCP 90 deg flexion   0 deg extension    deg flexion  0 deg extension   DIP 80 deg flexion   0  deg extension  - strength: 5/5  strength, 5/5 wrist abduction, 5/5 flexion, extension, pronation, supination, adduction  - special tests:  (-) varus  (-) valgus  Neuro  - no numbness, no motor deficit, grossly normal coordination, normal muscle tone  Skin  - no ecchymosis, erythema, warmth, or induration, no obvious rash          RADIOLOGY:    3 view xrays of bilateral hands performed and reviewed independently demonstrating diffuse DJD in the IP joints particularly in the DIP. See EMR for formal radiology report.     Reviewed x-ray of the cervical spine dated 11/30/2020.  Per radiology report:  IMPRESSION: There is minimal retrolisthesis at C5-C6. Posterior  alignment is otherwise normal. Vertebral body heights are maintained.  There is mild to moderate degenerative disc disease at C4-C5 and C5-C6  and some mild mid to lower cervical facet arthropathy. Soft tissues  are unremarkable as visualized.

## 2021-09-27 NOTE — LETTER
9/27/2021      RE: Ruthann Beck  [address]  [facility] [address]         [facility] SUR[facility]  SPORTS & ORTHOPEDIC CLINIC VISIT     Sep 27, 2021        ASSESSMENT & PLAN    61-year-old with bilateral hand pain and right upper extremity pain.  Her hand and finger pain is suspected to be related to osteoarthritis.  However, her right upper extremity symptoms are likely secondary to cervical nerve impingement.    Reviewed imaging and assessment with patient in detail  She was given a prescription for Celebrex to use in place ibuprofen as she has been using consistently and this may be less likely to cause systemic side effect.  She may also consider hand therapy  For her right upper extremity pain we would recommend a cervical MRI at this time given that she has been having persistent symptoms for 10 months and has been refractory to physical therapy, bracing, and NSAIDs.  She will follow-up virtually or in person after the MRI to discuss results.      Richie Lyles MD  [facility] SPORTS MEDICINE CLINIC [place]    -----  Chief Complaint   Patient presents with     Right Hand - Pain       SUBJECTIVE  Ruthann Beck is a/an 61 year old female who is seen in consultation at the request of Glory Alexandre MD for evaluation of  Right hand pain.     The patient is seen by themselves.  The patient is Right handed    Onset: 5 years(s) ago. Patient describes injury as having bilateral hand pain and numbness. When she wakes up in the AM she cannot bend her fingers, so she runs them under cold water and they will work better. At night time she wakes up with numbness and pins and needles in her right hand so she has to get up and sleep in a chair to relieve the symptoms. She did try a wrist brace and that made it worse.  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  Roughly 1  year ago she was having some right shoulder pain with radiation down the arm.  She had cervical spine x-ray performed at that time and was suspected to have a pinched nerve.  The shoulder pain has improved however at nighttime she continues to have symptoms radiating down her right arm.  She does have some tingling and numbness in this distribution as well.  She has been using 400 mg ibuprofen nightly.  She has tried topical diclofenac which has been ineffective.      Orthopedic/Surgical history: NO  Social History/Occupation: Baker       REVIEW OF SYSTEMS:    Do you have fever, chills, weight loss? No    Do you have any vision problems? No    Do you have any chest pain or edema? No    Do you have any shortness of breath or wheezing?  No    Do you have stomach problems? No    Do you have any numbness or focal weakness? No    Do you have diabetes? No    Do you have problems with bleeding or clotting? No    Do you have an rashes or other skin lesions? No    OBJECTIVE:  [facility] 04/05/2010      General: alert, pleasant, no distress  Neck/Spine: no TTP of spinous processes in cervical spine. Full ROM with flexion, extension, rotation, tilting  without pain. negative TTP along paraspinous muscles and upper trapezius musculature. negative Periscapular pain.  negative tightness in this area. No noted bruising or skin discoloration. Spurlings negative  Neurologic: SILT throughout upper extremities. Strength 5/5 and symmetric throughout upper extremities.   Upper Extremities: warm, well perfused, no edema. Full ROM without pain in shoulder, elbow, wrist, and hand. Good capillary refill bilaterally    General  - alert, pleasant, no distress  CV  - normal radial pulse, cap refill brisk  Musculoskeletal -right hand  - inspection: Some enlargement of the IP joints diffusely.  No atrophy, normal joint alignment, no swelling  - palpation: no bony or soft tissue tenderness, no tenderness at the anatomical snuffbox  - ROM:  MCP 90 deg  flexion   0 deg extension    deg flexion  0 deg extension   DIP 80 deg flexion   0 deg extension  - strength: 5/5  strength, 5/5 wrist abduction, 5/5 flexion, extension, pronation, supination, adduction  - special tests:  (-) varus  (-) valgus  Neuro  - no numbness, no motor deficit, grossly normal coordination, normal muscle tone  Skin  - no ecchymosis, erythema, warmth, or induration, no obvious rash          RADIOLOGY:    3 view xrays of bilateral hands performed and reviewed independently demonstrating diffuse DJD in the IP joints particularly in the DIP. See EMR for formal radiology report.     Reviewed x-ray of the cervical spine dated 11/30/2020.  Per radiology report:  IMPRESSION: There is minimal retrolisthesis at C5-C6. Posterior  alignment is otherwise normal. Vertebral body heights are maintained.  There is mild to moderate degenerative disc disease at C4-C5 and C5-C6  and some mild mid to lower cervical facet arthropathy. Soft tissues  are unremarkable as visualized.          Richie Lyles MD

## 2021-11-12 PROBLEM — M79.601 PAIN OF RIGHT UPPER EXTREMITY: Status: RESOLVED | Noted: 2021-03-10 | Resolved: 2021-11-12

## 2021-11-12 PROBLEM — R20.0 NUMBNESS OF RIGHT HAND: Status: RESOLVED | Noted: 2021-03-10 | Resolved: 2021-11-12

## 2021-11-12 NOTE — PROGRESS NOTES
Patient comes to clinic for follow up anticoagulation visit.  DX: ITP, PE Goal range: 2.0-3.0    LAST(INR) 2.6 on 2/18. Dose maintained.  Todays INR is 2.1. Dose maintained per protocol.  Follow up 4 weeks. See Ambulatory Anticoagulation Flow Sheet.  Recommended follow up with PCP for fatigue.    Teaching: warfarin dose, return date, conditions requiring contact with Anticoagulation Clinic reviewed. Dosing calendar and After Visit Summary provided. Patient verbalized understanding of instructions.       Patient did not return to physical therapy and was lost follow up.  Current status of patient is unknown at this point.  Please see last therapy session for last reported subjective and/or objective information.  Patient is formally discharged from physical therapy.

## 2021-11-15 ENCOUNTER — IMMUNIZATION (OUTPATIENT)
Dept: NURSING | Facility: CLINIC | Age: 61
End: 2021-11-15
Payer: COMMERCIAL

## 2021-11-15 PROCEDURE — 0064A COVID-19,PF,MODERNA (18+ YRS BOOSTER .25ML): CPT

## 2021-11-15 PROCEDURE — 91306 COVID-19,PF,MODERNA (18+ YRS BOOSTER .25ML): CPT

## 2021-12-03 ENCOUNTER — ANCILLARY PROCEDURE (OUTPATIENT)
Dept: GENERAL RADIOLOGY | Facility: CLINIC | Age: 61
End: 2021-12-03
Attending: NURSE PRACTITIONER
Payer: COMMERCIAL

## 2021-12-03 ENCOUNTER — OFFICE VISIT (OUTPATIENT)
Dept: URGENT CARE | Facility: URGENT CARE | Age: 61
End: 2021-12-03
Payer: COMMERCIAL

## 2021-12-03 VITALS
DIASTOLIC BLOOD PRESSURE: 71 MMHG | BODY MASS INDEX: 26.46 KG/M2 | WEIGHT: 159 LBS | OXYGEN SATURATION: 99 % | HEART RATE: 50 BPM | TEMPERATURE: 97 F | SYSTOLIC BLOOD PRESSURE: 116 MMHG

## 2021-12-03 DIAGNOSIS — L08.9 FINGER INFECTION: ICD-10-CM

## 2021-12-03 DIAGNOSIS — M79.645 PAIN OF FINGER OF LEFT HAND: Primary | ICD-10-CM

## 2021-12-03 DIAGNOSIS — M79.645 PAIN OF FINGER OF LEFT HAND: ICD-10-CM

## 2021-12-03 DIAGNOSIS — S61.239A PUNCTURE WOUND OF FINGER, INITIAL ENCOUNTER: ICD-10-CM

## 2021-12-03 PROCEDURE — 73140 X-RAY EXAM OF FINGER(S): CPT | Mod: LT | Performed by: RADIOLOGY

## 2021-12-03 PROCEDURE — 99214 OFFICE O/P EST MOD 30 MIN: CPT | Mod: 25 | Performed by: NURSE PRACTITIONER

## 2021-12-03 PROCEDURE — 90715 TDAP VACCINE 7 YRS/> IM: CPT | Performed by: NURSE PRACTITIONER

## 2021-12-03 PROCEDURE — 90471 IMMUNIZATION ADMIN: CPT | Performed by: NURSE PRACTITIONER

## 2021-12-03 NOTE — PATIENT INSTRUCTIONS
Patient Education     Discharge Instructions for Cellulitis   You have been diagnosed with cellulitis. This is an infection in the deepest layer of the skin and tissue beneath the skin. In some cases, the infection also affects the muscle. Cellulitis is caused by bacteria. The bacteria can enter the body through broken skin. This can happen with a cut, scratch, animal bite, or an insect bite that has been scratched. You may have been treated in the hospital with antibiotics and fluids. You will likely be given a prescription for antibiotics to take at home. This sheet will help you take care of yourself at home.  Home care  When you are home:    Take the prescribed antibiotic medicine you are given as directed until it is gone. Take it even if you feel better. It treats the infection and stops it from returning. Not taking all the medicine can make future infections hard to treat.    Keep the infected area clean.    When possible, raise the infected area above the level of your heart. This helps keep swelling down.    Talk with your healthcare provider if you are in pain. Ask what kind of over-the-counter medicine you can take for pain.    Apply clean bandages as advised.    Take your temperature once a day for a week.    Wash your hands often to prevent spreading the infection.  In the future, wash your hands before and after you touch cuts, scratches, or bandages. This will help prevent infection.   When to call your healthcare provider  Call your healthcare provider right away if you have any of the following:    Trouble or pain when moving the joints above or below the infected area    Discharge or pus draining from the area    Fever of 100.4 F (38 C) or higher, or as directed by your healthcare provider    Pain that gets worse in or around the infected     Redness that gets worse in or around the infected area, particularly if the area of redness expands to a wider area    Shaking chills    Swelling of the  infected area    Vomiting  Sharath last reviewed this educational content on 11/1/2019 2000-2021 [employer]. All rights reserved. This information is not intended as a substitute for professional medical care. Always follow your healthcare professional's instructions.

## 2021-12-03 NOTE — PROGRESS NOTES
Chief Complaint   Patient presents with     Urgent Care     Finger     c/o finger injury         ICD-10-CM    1. Pain of finger of left hand  M79.645 XR Finger Left G/E 2 Views     amoxicillin-clavulanate (AUGMENTIN) 875-125 MG tablet   2. Puncture wound of finger, initial encounter  S61.239A XR Finger Left G/E 2 Views     CANCELED: TD PRESERV FREE, IM (7+ YRS) (DECAVAC/TENIVAC)   3. Finger infection  L08.9 amoxicillin-clavulanate (AUGMENTIN) 875-125 MG tablet     Will treat infection with antibiotics.  Updated patient's tetanus vaccination.  If symptoms fail to improve in the next 24 to 48 hours she will go to the emergency room for further assessment.    Xray - Reviewed and interpreted by me.  Left index finger shows no acute fractures.    Subjective     Ruthann Beck is an 61 year old female who presents to clinic today for pain in the left index finger after having a drill go into the finger.  The finger has become swollen and red since the incident occurred yesterday.      ROS: 10 point ROS neg other than the symptoms noted above in the HPI.       Objective    /71   Pulse 50   Temp 97  F (36.1  C)   Wt 72.1 kg (159 lb)   LMP 04/05/2010   SpO2 99%   BMI 26.46 kg/m    Nurses notes and VS have been reviewed.    Physical Exam     Alert and oriented, left hand has full range of motion, left index finger has erythema and swelling at the DIP joint to the fingertip, pad of finger has a 5 mm puncture that has scabbed over    Patient Instructions     Patient Education     Discharge Instructions for Cellulitis   You have been diagnosed with cellulitis. This is an infection in the deepest layer of the skin and tissue beneath the skin. In some cases, the infection also affects the muscle. Cellulitis is caused by bacteria. The bacteria can enter the body through broken skin. This can happen with a cut, scratch, animal bite, or an insect bite that has been scratched. You may have been treated in the  hospital with antibiotics and fluids. You will likely be given a prescription for antibiotics to take at home. This sheet will help you take care of yourself at home.  Home care  When you are home:    Take the prescribed antibiotic medicine you are given as directed until it is gone. Take it even if you feel better. It treats the infection and stops it from returning. Not taking all the medicine can make future infections hard to treat.    Keep the infected area clean.    When possible, raise the infected area above the level of your heart. This helps keep swelling down.    Talk with your healthcare provider if you are in pain. Ask what kind of over-the-counter medicine you can take for pain.    Apply clean bandages as advised.    Take your temperature once a day for a week.    Wash your hands often to prevent spreading the infection.  In the future, wash your hands before and after you touch cuts, scratches, or bandages. This will help prevent infection.   When to call your healthcare provider  Call your healthcare provider right away if you have any of the following:    Trouble or pain when moving the joints above or below the infected area    Discharge or pus draining from the area    Fever of 100.4 F (38 C) or higher, or as directed by your healthcare provider    Pain that gets worse in or around the infected     Redness that gets worse in or around the infected area, particularly if the area of redness expands to a wider area    Shaking chills    Swelling of the infected area    Vomiting  [employer] Image by [employer] last reviewed this educational content on 11/1/2019 2000-2021 [employer]. All rights reserved. This information is not intended as a substitute for professional medical care. Always follow your healthcare professional's instructions.               REMINGTON Kowalski, CNP  [place] [facility] Provider    The use of Dragon/CYBRA dictation services may have been used to construct the content in this  note; any grammatical or spelling errors are non-intentional. Please contact the author of this note directly if you are in need of any clarification.

## 2021-12-26 ENCOUNTER — HEALTH MAINTENANCE LETTER (OUTPATIENT)
Age: 61
End: 2021-12-26

## 2022-01-28 ENCOUNTER — OFFICE VISIT (OUTPATIENT)
Dept: FAMILY MEDICINE | Facility: CLINIC | Age: 62
End: 2022-01-28
Payer: COMMERCIAL

## 2022-01-28 VITALS
BODY MASS INDEX: 23.45 KG/M2 | HEART RATE: 77 BPM | WEIGHT: 149.4 LBS | TEMPERATURE: 97.5 F | SYSTOLIC BLOOD PRESSURE: 118 MMHG | OXYGEN SATURATION: 99 % | HEIGHT: 67 IN | DIASTOLIC BLOOD PRESSURE: 75 MMHG

## 2022-01-28 DIAGNOSIS — M65.30 TRIGGER FINGER, ACQUIRED: ICD-10-CM

## 2022-01-28 DIAGNOSIS — M65.4 DE QUERVAIN'S DISEASE (TENOSYNOVITIS): Primary | ICD-10-CM

## 2022-01-28 PROCEDURE — 99214 OFFICE O/P EST MOD 30 MIN: CPT | Performed by: FAMILY MEDICINE

## 2022-01-28 ASSESSMENT — MIFFLIN-ST. JEOR: SCORE: 1272.36

## 2022-01-28 NOTE — PATIENT INSTRUCTIONS
Ibuprofen 600 mg at night to help with pain, please take with food  Use splint at night   Follow up with orthopedic provider if symptoms are not improving       Patient Education     De Quervain Tenosynovitis    De Quervain tenosynovitis is inflammation of tendons and synovium on the thumb side of the wrist. Tendons are fibers that attach muscle to bone. Synovium is a slick membrane that helps tendons move. Movements done over and over can irritate and inflame these tissues. This can cause pain when you touch or grasp objects, turn or twist your wrist, or make a fist. You may also have pain and swelling near the base of the thumb or numbness along the back of your thumb and index finger. You may also feel the thumb catch or snap when you move it.  Treatment will depend on how bad the symptoms are. It can often be treated with medicines, injections, splinting, and home care. If your case is severe, you may be referred to a specialist to talk about surgery.  Home care  Your healthcare provider may prescribe medicines to relieve pain and reduce inflammation. A steroid medicine may be injected near the tendons. This reduces swelling and pain. The healthcare provider may also suggest taking over-the-counter medicines such as ibuprofen or naproxen. These help reduce inflammation. Take all medicines only as directed.  The following are general care guidelines:    Avoid repetitive movements of your wrist and thumb.    Note any activity that causes pain or swelling. If possible, avoid or limit that activity.    Put a cold pack on your thumb. To make an ice pack, put ice cubes in a plastic bag that seals at the top. Wrap the bag in a clean, thin towel or cloth. Hold this to your thumb for up to 20 minutes at a time. Don't put ice directly on the skin.    Your healthcare provider may put a splint on the thumb to hold it still. Use the splint as you have been instructed. In some cases, you may need to use a splint 24 hours a  day for 4 to 6 weeks. This will allow the wrist and thumb to heal.  Follow-up care  Follow up with your healthcare provider, or as advised. You may need more treatment if your injury is severe or if your symptoms don't get better. This additional treatment may include local injections, physical therapy, and surgery.  When to seek medical advice  Call your healthcare provider right away if any of these occur:    Increase in pain or swelling    You have fever, chills, redness, warmth, or drainage    Symptoms get worse after taking medicine    Pain spreads farther down the thumb or into the forearm    Pain continues to get in the way of daily life  Sharath last reviewed this educational content on 6/1/2018 2000-2021 [employer]. All rights reserved. This information is not intended as a substitute for professional medical care. Always follow your healthcare professional's instructions.

## 2022-02-18 ENCOUNTER — THERAPY VISIT (OUTPATIENT)
Dept: PHYSICAL THERAPY | Facility: CLINIC | Age: 62
End: 2022-02-18
Payer: COMMERCIAL

## 2022-02-18 DIAGNOSIS — M25.561 RIGHT KNEE PAIN, UNSPECIFIED CHRONICITY: ICD-10-CM

## 2022-02-18 PROCEDURE — 97161 PT EVAL LOW COMPLEX 20 MIN: CPT | Mod: GP | Performed by: PHYSICAL THERAPIST

## 2022-02-18 PROCEDURE — 97110 THERAPEUTIC EXERCISES: CPT | Mod: GP | Performed by: PHYSICAL THERAPIST

## 2022-02-18 ASSESSMENT — ACTIVITIES OF DAILY LIVING (ADL)
GO DOWN STAIRS: ACTIVITY IS FAIRLY DIFFICULT
WEAKNESS: THE SYMPTOM AFFECTS MY ACTIVITY SLIGHTLY
WALK: ACTIVITY IS SOMEWHAT DIFFICULT
STIFFNESS: THE SYMPTOM AFFECTS MY ACTIVITY MODERATELY
HOW_WOULD_YOU_RATE_THE_CURRENT_FUNCTION_OF_YOUR_KNEE_DURING_YOUR_USUAL_DAILY_ACTIVITIES_ON_A_SCALE_FROM_0_TO_100_WITH_100_BEING_YOUR_LEVEL_OF_KNEE_FUNCTION_PRIOR_TO_YOUR_INJURY_AND_0_BEING_THE_INABILITY_TO_PERFORM_ANY_OF_YOUR_USUAL_DAILY_ACTIVITIES?: 20
SQUAT: ACTIVITY IS FAIRLY DIFFICULT
SIT WITH YOUR KNEE BENT: ACTIVITY IS MINIMALLY DIFFICULT
SWELLING: I DO NOT HAVE THE SYMPTOM
KNEEL ON THE FRONT OF YOUR KNEE: ACTIVITY IS MINIMALLY DIFFICULT
HOW_WOULD_YOU_RATE_THE_OVERALL_FUNCTION_OF_YOUR_KNEE_DURING_YOUR_USUAL_DAILY_ACTIVITIES?: ABNORMAL
GIVING WAY, BUCKLING OR SHIFTING OF KNEE: THE SYMPTOM AFFECTS MY ACTIVITY MODERATELY
KNEE_ACTIVITY_OF_DAILY_LIVING_SCORE: 60
RAW_SCORE: 42
GO UP STAIRS: ACTIVITY IS SOMEWHAT DIFFICULT
KNEE_ACTIVITY_OF_DAILY_LIVING_SUM: 42
PAIN: THE SYMPTOM AFFECTS MY ACTIVITY SLIGHTLY
LIMPING: THE SYMPTOM AFFECTS MY ACTIVITY MODERATELY
STAND: ACTIVITY IS SOMEWHAT DIFFICULT
AS_A_RESULT_OF_YOUR_KNEE_INJURY,_HOW_WOULD_YOU_RATE_YOUR_CURRENT_LEVEL_OF_DAILY_ACTIVITY?: ABNORMAL
RISE FROM A CHAIR: ACTIVITY IS MINIMALLY DIFFICULT

## 2022-02-18 NOTE — PROGRESS NOTES
[facility]  Physical Therapy Initial Examination/Evaluation  February 18, 2022    Ruthann Beck is a 61 year old  female referred to physical therapy by Dr. Alexandre for treatment of knee pain with Precautions/Restrictions/MD instructions none    KEY PT FINDINGS:  1.  Suspect pes anserine bursitis  2.  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  Job duties:  none    Current HEP/exercise regimen: PT exercises, biking  Patient's goals are reduce pain in knee    Pertinent PMH: see epic   General Health Reported by Patient: good  Return to MD:  PRN         Lower Extremity Exam    Dynamic Movement Screen:  2 leg stance: WNL    Gait: decreased TKE during stance    Patellofemoral Joint Examination:  Test Right Left   Quadrant Mobility (Med:Lat) 1:1  1:1   Effusion 0 0   Quad Activation Normal Normal     Knee Joint AROM   Right Left Difference   Hyperextension 0  deg 0 deg 0 deg   Extension 0 deg 0 deg 0 deg   Flexion 145 deg 145 deg 0 deg     Palpation:   Tender to palpation at the following structures: pes anserinus    Ligamentous examination:  Valgus stress at 30 and 0 deg KF negative for instability on L  Lachman's negative on L        Assessment/Plan    Patient is a 61 year old female with right side knee complaints.    Patient has the following significant findings with corresponding treatment plan.                Diagnosis 1:  Knee pain - pes  anserine bursitis    Pain -  hot/cold therapy, US, electric stimulation, manual therapy, splint/taping/bracing/orthotics, self management, education, and home program  Decreased ROM/flexibility - manual therapy and therapeutic exercise  Decreased joint mobility - manual therapy and therapeutic exercise  Decreased strength - therapeutic exercise and therapeutic activities  Impaired balance - neuro re-education and therapeutic activities  Decreased proprioception - neuro re-education and therapeutic activities  Inflammation - cold therapy  Edema - vasopneumatics  Impaired gait - gait training  Impaired muscle performance - neuro re-education  Decreased function - therapeutic activities    Therapy Evaluation Codes:   1) History comprised of:   Personal factors that impact the plan of care:      None.    Comorbidity factors that impact the plan of care are:      None.     Medications impacting care: None.  2) Examination of Body Systems comprised of:   Body structures and functions that impact the plan of care:      Knee.   Activity limitations that impact the plan of care are:      Squatting/kneeling, Stairs, Standing and Walking.  3) Clinical presentation characteristics are:   Stable/Uncomplicated.  4) Decision-Making    Low complexity using standardized patient assessment instrument and/or measureable assessment of functional outcome.  Cumulative Therapy Evaluation is: Low complexity.    Previous and current functional limitations:  (See Goal Flow Sheet for this information)    Short term and Long term goals: (See Goal Flow Sheet for this information)     Communication ability:  Patient appears to be able to clearly communicate and understand verbal and written communication and follow directions correctly.  Treatment Explanation - The following has been discussed with the patient:   RX ordered/plan of care  Anticipated outcomes  Possible risks and side effects  This patient would benefit from PT intervention to  resume normal activities.   Rehab potential is good.    Frequency:  1 X week, once daily  Duration:  for 6 weeks  Discharge Plan:  Achieve all LTG.  Independent in home treatment program.  Reach maximal therapeutic benefit.    Please refer to the daily flowsheet for treatment today, total treatment time and time spent performing 1:1 timed codes.

## 2022-02-21 NOTE — PROGRESS NOTES
Physical Therapy Initial Evaluation  Subjective:  The history is provided by the patient. No  was used.   Patient Health History  Ruthann Beck being seen for Sprained right knee.     Date of Onset: 2/11/2022.   Problem occurred: slipped on ice   Pain is reported as 3/10 on pain scale.  General health as reported by patient is excellent.  Pertinent medical history includes: none.   Red flags:  None as reported by patient.  Medical allergies: none.   Surgeries include:  Orthopedic surgery. Other surgeries: Right broken ankle.    Current medications:  None.    Current occupation is [employer].   Primary job tasks include:  Lifting/carrying and repetitive tasks.                                    Objective:  System    Physical Exam    General     ROS    Assessment/Plan:

## 2022-09-20 ENCOUNTER — TRANSFERRED RECORDS (OUTPATIENT)
Dept: MULTI SPECIALTY CLINIC | Facility: CLINIC | Age: 62
End: 2022-09-20

## 2022-10-29 ENCOUNTER — HEALTH MAINTENANCE LETTER (OUTPATIENT)
Age: 62
End: 2022-10-29

## 2023-04-02 ENCOUNTER — HEALTH MAINTENANCE LETTER (OUTPATIENT)
Age: 63
End: 2023-04-02

## 2023-04-08 ENCOUNTER — MYC MEDICAL ADVICE (OUTPATIENT)
Dept: FAMILY MEDICINE | Facility: CLINIC | Age: 63
End: 2023-04-08
Payer: COMMERCIAL

## 2023-04-08 DIAGNOSIS — Z13.21 ENCOUNTER FOR VITAMIN DEFICIENCY SCREENING: Primary | ICD-10-CM

## 2023-04-11 NOTE — TELEPHONE ENCOUNTER
Pended lab per pt request    Advised to make appt with  PCP    Alberta Rosenthal, TOSHAN RN  [facility]

## 2023-05-03 ENCOUNTER — LAB (OUTPATIENT)
Dept: LAB | Facility: CLINIC | Age: 63
End: 2023-05-03
Payer: COMMERCIAL

## 2023-05-03 DIAGNOSIS — Z13.21 ENCOUNTER FOR VITAMIN DEFICIENCY SCREENING: ICD-10-CM

## 2023-05-03 LAB — DEPRECATED CALCIDIOL+CALCIFEROL SERPL-MC: 42 UG/L (ref 20–75)

## 2023-05-03 PROCEDURE — 82306 VITAMIN D 25 HYDROXY: CPT | Performed by: FAMILY MEDICINE

## 2023-05-03 PROCEDURE — 36415 COLL VENOUS BLD VENIPUNCTURE: CPT | Performed by: PATHOLOGY

## 2023-06-05 ENCOUNTER — ANCILLARY PROCEDURE (OUTPATIENT)
Dept: GENERAL RADIOLOGY | Facility: CLINIC | Age: 63
End: 2023-06-05
Attending: FAMILY MEDICINE
Payer: COMMERCIAL

## 2023-06-05 ENCOUNTER — OFFICE VISIT (OUTPATIENT)
Dept: FAMILY MEDICINE | Facility: CLINIC | Age: 63
End: 2023-06-05
Payer: COMMERCIAL

## 2023-06-05 VITALS
BODY MASS INDEX: 25.83 KG/M2 | OXYGEN SATURATION: 97 % | RESPIRATION RATE: 17 BRPM | WEIGHT: 155.06 LBS | HEART RATE: 78 BPM | SYSTOLIC BLOOD PRESSURE: 117 MMHG | TEMPERATURE: 97.4 F | HEIGHT: 65 IN | DIASTOLIC BLOOD PRESSURE: 76 MMHG

## 2023-06-05 DIAGNOSIS — Z23 HIGH PRIORITY FOR 2019-NCOV VACCINE: ICD-10-CM

## 2023-06-05 DIAGNOSIS — Z53.20 HIV SCREENING DECLINED: ICD-10-CM

## 2023-06-05 DIAGNOSIS — F51.01 PRIMARY INSOMNIA: ICD-10-CM

## 2023-06-05 DIAGNOSIS — Z00.00 ROUTINE GENERAL MEDICAL EXAMINATION AT A HEALTH CARE FACILITY: ICD-10-CM

## 2023-06-05 DIAGNOSIS — Z13.220 LIPID SCREENING: ICD-10-CM

## 2023-06-05 DIAGNOSIS — M25.561 RIGHT KNEE PAIN, UNSPECIFIED CHRONICITY: ICD-10-CM

## 2023-06-05 DIAGNOSIS — Z12.83 SKIN CANCER SCREENING: ICD-10-CM

## 2023-06-05 PROCEDURE — 73562 X-RAY EXAM OF KNEE 3: CPT | Mod: TC | Performed by: RADIOLOGY

## 2023-06-05 PROCEDURE — 99396 PREV VISIT EST AGE 40-64: CPT | Mod: 25 | Performed by: FAMILY MEDICINE

## 2023-06-05 PROCEDURE — 99213 OFFICE O/P EST LOW 20 MIN: CPT | Mod: 25 | Performed by: FAMILY MEDICINE

## 2023-06-05 PROCEDURE — 91312 COVID-19 BIVALENT 12+ (PFIZER): CPT | Performed by: FAMILY MEDICINE

## 2023-06-05 PROCEDURE — 0121A COVID-19 BIVALENT 12+ (PFIZER): CPT | Performed by: FAMILY MEDICINE

## 2023-06-05 RX ORDER — PROGESTERONE 100 MG/1
100 CAPSULE ORAL DAILY
COMMUNITY

## 2023-06-05 ASSESSMENT — ENCOUNTER SYMPTOMS
JOINT SWELLING: 0
ABDOMINAL PAIN: 0
PARESTHESIAS: 0
HEMATURIA: 0
CHILLS: 0
WEAKNESS: 0
COUGH: 0
DIZZINESS: 0
DYSURIA: 0
MYALGIAS: 0
SORE THROAT: 0
SHORTNESS OF BREATH: 0
FEVER: 0
HEADACHES: 0
NERVOUS/ANXIOUS: 0
HEMATOCHEZIA: 0
NAUSEA: 0
FREQUENCY: 0
HEARTBURN: 0
CONSTIPATION: 0
PALPITATIONS: 0
ARTHRALGIAS: 1
DIARRHEA: 0
EYE PAIN: 0

## 2023-06-05 ASSESSMENT — PAIN SCALES - GENERAL: PAINLEVEL: NO PAIN (0)

## 2023-06-05 NOTE — PROGRESS NOTES
SUBJECTIVE:   CC: Ruthann is an 63 year old who presents for preventive health visit.       6/5/2023     9:03 AM   Additional Questions   Roomed by Abigail     Healthy Habits:     Getting at least 3 servings of Calcium per day:  Yes    Bi-annual eye exam:  Yes    Dental care twice a year:  Yes    Sleep apnea or symptoms of sleep apnea:  Daytime drowsiness    Diet:  Regular (no restrictions)    Frequency of exercise:  4-5 days/week    Duration of exercise:  30-45 minutes    Taking medications regularly:  Yes    Medication side effects:  Not applicable    PHQ-2 Total Score: 0    Additional concerns today:  Yes    She wants to restart PT for right knee pain. When she does PT, she has anterior knee pain when doing stairs. She has joint stiffness. No right knee pain. She also takes Ibuprofen PRN.     She still has sleep difficulties and has appointment with sleep clrosario in [place] which does more behavioral modifications. She exercises five days per week. She drinks coffee in the morning. She listens to behavioral modifications on her way home. She is trying to do natural ways to support sleep. She has had sleep difficulties for 10 years. No snoring. She previously tried elavil and trazodone which didn't help with sleep. She restarted progesterone last year which has helped a little. 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 She now works 8 a - 4:30 pm.    She started taking magnesium to help with sleep.     Today's PHQ-2 Score:       6/5/2023     8:58 AM   PHQ-2 ( 1999 [employer])   Q1: Little interest or pleasure in doing things 0   Q2: Feeling down, depressed or hopeless 0   PHQ-2 Score 0   Q1: Little interest or pleasure in doing things Not at all   Q2: Feeling down, depressed or hopeless Not at all   PHQ-2 Score 0       Have you ever done Advance Care Planning? (For example, a Health Directive, POLST, or a discussion with a medical provider or your loved ones about your wishes): No, advance care  planning information given to patient to review.  Patient plans to discuss their wishes with loved ones or provider.      Social History     Tobacco Use     Smoking status: Never     Smokeless tobacco: Never   Vaping Use     Vaping status: Not on file   Substance Use Topics     Alcohol use: Yes     Comment: 1 beer or glass of wine a day             6/5/2023     8:58 AM   Alcohol Use   Prescreen: >3 drinks/day or >7 drinks/week? No     Reviewed orders with patient.  Reviewed health maintenance and updated orders accordingly - Yes      Breast Cancer Screening:    FHS-7:       6/5/2023     8:59 AM   Breast CA Risk Assessment (FHS-7)   Did any of your first-degree relatives have breast or ovarian cancer? No   Did any of your relatives have bilateral breast cancer? No   Did any man in your family have breast cancer? No   Did any woman in your family have breast and ovarian cancer? No    Did any woman in your family have breast cancer before age 50 y? No   Do you have 2 or more relatives with breast and/or ovarian cancer? No   Do you have 2 or more relatives with breast and/or bowel cancer? No       Pertinent mammograms are reviewed under the imaging tab.    History of abnormal Pap smear: NO - age 30-65 PAP every 5 years with negative HPV co-testing recommended     Reviewed and updated as needed this visit by clinical staff   Tobacco  Allergies  Meds              Reviewed and updated as needed this visit by Provider                     Review of Systems   Constitutional: Negative for chills and fever.   HENT: Negative for congestion, ear pain, hearing loss and sore throat.    Eyes: Negative for pain and visual disturbance.   Respiratory: Negative for cough and shortness of breath.    Cardiovascular: Negative for chest pain and palpitations.   Gastrointestinal: Negative for abdominal pain, constipation, diarrhea, heartburn, hematochezia and nausea.   Genitourinary: Negative for dysuria, frequency, genital sores,  "hematuria and urgency.   Musculoskeletal: Positive for arthralgias. Negative for joint swelling and myalgias.   Skin: Negative for rash.   Neurological: Negative for dizziness, weakness, headaches and paresthesias.   Psychiatric/Behavioral: Negative for mood changes. The patient is not nervous/anxious.           OBJECTIVE:   /76 (BP Location: Right arm, Patient Position: Sitting, Cuff Size: Adult Regular)   Pulse 78   Temp 97.4  F (36.3  C) (Temporal)   Resp 17   Ht 1.651 m (5' 5\")   Wt 70.3 kg (155 lb 1 oz)   LMP 04/05/2010   SpO2 97%   BMI 25.80 kg/m    Physical Exam  GENERAL APPEARANCE: healthy, alert and no distress  EYES: Eyes grossly normal to inspection  HENT: ear canals and TM's normal  NECK: no adenopathy, no asymmetry, masses, or scars and thyroid normal to palpation  RESP: lungs clear to auscultation - no rales, rhonchi or wheezes  CV: regular rate and rhythm, normal S1 S2  ABDOMEN: soft, nontender, no hepatosplenomegaly, no masses and bowel sounds normal  MS: no musculoskeletal defects are noted and gait is age appropriate without ataxia  SKIN: no suspicious lesions or rashes  NEURO: Normal strength and tone, sensory exam grossly normal, mentation intact and speech normal  PSYCH: mentation appears normal and affect normal    Diagnostic Test Results:  Labs reviewed in Epic    ASSESSMENT/PLAN:     Routine general medical examination at a health care facility  Normal mammogram 9/2022  Up to date with colonoscopy, due 2025  Will follow up with GYN for pap     Right knee pain  - will refer to sports medicine if symptoms are not improving in 1-2 months   - XR Knee Right 3 Views; Future  - Physical Therapy Referral; Future    Primary insomnia  - Scheduled with sleep clinic 8/2023  - declined Atarax PRN  - CBC with platelets; Future  - Comprehensive metabolic panel (BMP + Alb, Alk Phos, ALT, AST, Total. Bili, TP); Future  - Magnesium; Future  - TSH with free T4 reflex; Future    4. Lipid " screening  - Lipid panel reflex to direct LDL Fasting; Future    5. HIV screening declined    6. High priority for 2019-nCoV vaccine  - COVID-19 BIVALENT 12+ (PFIZER)    7. Skin cancer screening  - Adult Dermatology Referral; Future        Patient has been advised of split billing requirements and indicates understanding: Yes      COUNSELING:  Reviewed preventive health counseling, as reflected in patient instructions        She reports that she has never smoked. She has never used smokeless tobacco.          Glory Bass MD  [facility]

## 2023-06-06 ENCOUNTER — LAB (OUTPATIENT)
Dept: LAB | Facility: CLINIC | Age: 63
End: 2023-06-06
Payer: COMMERCIAL

## 2023-06-06 DIAGNOSIS — Z13.220 LIPID SCREENING: ICD-10-CM

## 2023-06-06 DIAGNOSIS — F51.01 PRIMARY INSOMNIA: ICD-10-CM

## 2023-06-06 LAB
ALBUMIN SERPL BCG-MCNC: 4.2 G/DL (ref 3.5–5.2)
ALP SERPL-CCNC: 74 U/L (ref 35–104)
ALT SERPL W P-5'-P-CCNC: 17 U/L (ref 10–35)
ANION GAP SERPL CALCULATED.3IONS-SCNC: 7 MMOL/L (ref 7–15)
AST SERPL W P-5'-P-CCNC: 20 U/L (ref 10–35)
BILIRUB SERPL-MCNC: 0.4 MG/DL
BUN SERPL-MCNC: 14.2 MG/DL (ref 8–23)
CALCIUM SERPL-MCNC: 9.3 MG/DL (ref 8.8–10.2)
CHLORIDE SERPL-SCNC: 107 MMOL/L (ref 98–107)
CHOLEST SERPL-MCNC: 242 MG/DL
CREAT SERPL-MCNC: 0.85 MG/DL (ref 0.51–0.95)
DEPRECATED HCO3 PLAS-SCNC: 26 MMOL/L (ref 22–29)
ERYTHROCYTE [DISTWIDTH] IN BLOOD BY AUTOMATED COUNT: 12.7 % (ref 10–15)
GFR SERPL CREATININE-BSD FRML MDRD: 77 ML/MIN/1.73M2
GLUCOSE SERPL-MCNC: 95 MG/DL (ref 70–99)
HCT VFR BLD AUTO: 39.7 % (ref 35–47)
HDLC SERPL-MCNC: 73 MG/DL
HGB BLD-MCNC: 13.4 G/DL (ref 11.7–15.7)
LDLC SERPL CALC-MCNC: 150 MG/DL
MAGNESIUM SERPL-MCNC: 2.2 MG/DL (ref 1.7–2.3)
MCH RBC QN AUTO: 30.3 PG (ref 26.5–33)
MCHC RBC AUTO-ENTMCNC: 33.8 G/DL (ref 31.5–36.5)
MCV RBC AUTO: 90 FL (ref 78–100)
NONHDLC SERPL-MCNC: 169 MG/DL
PLATELET # BLD AUTO: 244 10E3/UL (ref 150–450)
POTASSIUM SERPL-SCNC: 4.3 MMOL/L (ref 3.4–5.3)
PROT SERPL-MCNC: 7 G/DL (ref 6.4–8.3)
RBC # BLD AUTO: 4.42 10E6/UL (ref 3.8–5.2)
SODIUM SERPL-SCNC: 140 MMOL/L (ref 136–145)
TRIGL SERPL-MCNC: 94 MG/DL
TSH SERPL DL<=0.005 MIU/L-ACNC: 3.21 UIU/ML (ref 0.3–4.2)
WBC # BLD AUTO: 6 10E3/UL (ref 4–11)

## 2023-06-06 PROCEDURE — 80061 LIPID PANEL: CPT | Performed by: PATHOLOGY

## 2023-06-06 PROCEDURE — 84443 ASSAY THYROID STIM HORMONE: CPT | Performed by: PATHOLOGY

## 2023-06-06 PROCEDURE — 80053 COMPREHEN METABOLIC PANEL: CPT | Performed by: PATHOLOGY

## 2023-06-06 PROCEDURE — 36415 COLL VENOUS BLD VENIPUNCTURE: CPT | Performed by: PATHOLOGY

## 2023-06-06 PROCEDURE — 85027 COMPLETE CBC AUTOMATED: CPT | Performed by: PATHOLOGY

## 2023-06-06 PROCEDURE — 83735 ASSAY OF MAGNESIUM: CPT | Performed by: PATHOLOGY

## 2023-07-14 ENCOUNTER — THERAPY VISIT (OUTPATIENT)
Dept: PHYSICAL THERAPY | Facility: CLINIC | Age: 63
End: 2023-07-14
Payer: COMMERCIAL

## 2023-07-14 DIAGNOSIS — M25.561 RIGHT KNEE PAIN: Primary | ICD-10-CM

## 2023-07-14 PROCEDURE — 97110 THERAPEUTIC EXERCISES: CPT | Mod: GP

## 2023-07-14 PROCEDURE — 97161 PT EVAL LOW COMPLEX 20 MIN: CPT | Mod: GP

## 2023-07-14 ASSESSMENT — ACTIVITIES OF DAILY LIVING (ADL)
AS_A_RESULT_OF_YOUR_KNEE_INJURY,_HOW_WOULD_YOU_RATE_YOUR_CURRENT_LEVEL_OF_DAILY_ACTIVITY?: NORMAL
PAIN: THE SYMPTOM AFFECTS MY ACTIVITY SLIGHTLY
SIT WITH YOUR KNEE BENT: ACTIVITY IS SOMEWHAT DIFFICULT
GO UP STAIRS: ACTIVITY IS MINIMALLY DIFFICULT
GO DOWN STAIRS: ACTIVITY IS MINIMALLY DIFFICULT
LIMPING: THE SYMPTOM AFFECTS MY ACTIVITY SLIGHTLY
GIVING WAY, BUCKLING OR SHIFTING OF KNEE: I HAVE THE SYMPTOM BUT IT DOES NOT AFFECT MY ACTIVITY
KNEE_ACTIVITY_OF_DAILY_LIVING_SUM: 56
STAND: ACTIVITY IS NOT DIFFICULT
HOW_WOULD_YOU_RATE_THE_CURRENT_FUNCTION_OF_YOUR_KNEE_DURING_YOUR_USUAL_DAILY_ACTIVITIES_ON_A_SCALE_FROM_0_TO_100_WITH_100_BEING_YOUR_LEVEL_OF_KNEE_FUNCTION_PRIOR_TO_YOUR_INJURY_AND_0_BEING_THE_INABILITY_TO_PERFORM_ANY_OF_YOUR_USUAL_DAILY_ACTIVITIES?: 90
SQUAT: ACTIVITY IS MINIMALLY DIFFICULT
RAW_SCORE: 56
WEAKNESS: THE SYMPTOM AFFECTS MY ACTIVITY SLIGHTLY
KNEE_ACTIVITY_OF_DAILY_LIVING_SCORE: 80
HOW_WOULD_YOU_RATE_THE_OVERALL_FUNCTION_OF_YOUR_KNEE_DURING_YOUR_USUAL_DAILY_ACTIVITIES?: NEARLY NORMAL
KNEEL ON THE FRONT OF YOUR KNEE: ACTIVITY IS NOT DIFFICULT
STIFFNESS: I HAVE THE SYMPTOM BUT IT DOES NOT AFFECT MY ACTIVITY
WALK: ACTIVITY IS MINIMALLY DIFFICULT
SWELLING: I DO NOT HAVE THE SYMPTOM
RISE FROM A CHAIR: ACTIVITY IS NOT DIFFICULT

## 2023-07-14 NOTE — PROGRESS NOTES
PHYSICAL THERAPY EVALUATION  Type of Visit: Evaluation    See electronic medical record for Abuse and Falls Screening details.    Subjective     Pt presents today with R knee pain that she's been dealing with on/off for the past year. She's been seen for PT this past year, but reports that her exercises started to hurt her knee this past winter. She stopped doing her exercises (2 months ago) and since then, the knee pain has for the most part gone away. She enjoys swimming, biking, running, etc. Right now, swimming bothers her. Biking does not. She currently is not running but wishes to. Also, going up/down the stairs causes her knee pain.     Presenting condition or subjective complaint:    Date of onset: 06/05/23    Relevant medical history:     Dates & types of surgery:      Prior diagnostic imaging/testing results:       Prior therapy history for the same diagnosis, illness or injury:        Prior Level of Function   Transfers: Independent  Ambulation: Independent  ADL: Independent  IADL: none    Living Environment  Social support:     Type of home:     Stairs to enter the home:         Ramp:     Stairs inside the home:         Help at home:    Equipment owned:       Employment:      Hobbies/Interests:      Patient goals for therapy:      Pain assessment: See objective evaluation for additional pain details     Objective   KNEE EVALUATION  PAIN: Pain Level at Rest: 0/10  Pain Level with Use: 6/10  Pain Location: knee  Pain Quality: Sharp  Pain Frequency: intermittent  Pain is Worst: NA  Pain is Exacerbated By: swimming, old PT exercises  Pain is Relieved By: ibuprofen  Pain Progression: Improved  INTEGUMENTARY (edema, incisions):   POSTURE:   GAIT:  Weightbearing Status:   Assistive Device(s):   Gait Deviations:   BALANCE/PROPRIOCEPTION: S/L balance: 30 sec R/L without use of UE support, minimal hopping on R leg  WEIGHTBEARING ALIGNMENT:   NON-WEIGHTBEARING ALIGNMENT:   ROM:   (Degrees) Left AROM Left PROM   Right AROM Right PROM   Knee Flexion WNL  WNL    Knee Extension WNL  WNL    Pain:   End feel: no pain with overpressure into flexion/extension    STRENGTH:      Lower Extremity Muscle Strength   Left Right   Hip Flex 4-/5 5/5   Hip Ext 4+/5 4+/5   Hip ER /5 /5   Hip IR /5 /5   Hip ABD 4+/5 4+/5   Hip ADD /5 /5   Knee Ext 5*/5 5/5   Knee Flex 5/5 5/5       FLEXIBILITY:   SPECIAL TESTS:   FUNCTIONAL TESTS: DL heel raises: WNL 5/5 PF, DL squat: WNL, knee clicking  PALPATION: not tender to palpation  JOINT MOBILITY:     Assessment & Plan   CLINICAL IMPRESSIONS   Medical Diagnosis: R knee pain    Treatment Diagnosis: R knee pain   Impression/Assessment: Patient is a 63 year old female with R knee pain complaints.  The following significant findings have been identified: Pain, Decreased strength, Impaired muscle performance and Decreased activity tolerance. These impairments interfere with their ability to perform self care tasks, work tasks, recreational activities, household chores, household mobility and community mobility as compared to previous level of function.     Clinical Decision Making (Complexity):   Clinical Presentation: Stable/Uncomplicated  Clinical Presentation Rationale: based on medical and personal factors listed in PT evaluation  Clinical Decision Making (Complexity): Low complexity    PLAN OF CARE  Treatment Interventions:  Interventions: Manual Therapy, Neuromuscular Re-education, Therapeutic Activity, Therapeutic Exercise, Self-Care/Home Management    Long Term Goals     PT Goal 1  Goal Identifier: Running  Goal Description: Pt will be able to run 2 mi without knee pain in order to  Rationale: to maximize safety and independence with performance of ADLs and functional tasks;to maximize safety and independence within the community;to maximize safety and independence within the home;to maximize safety and independence with transportation;to maximize safety and independence with self cares  Target  Date: 09/08/23  PT Goal 2  Goal Identifier: Stairs  Goal Description: Ascend and descend stairs in a normal reciprocal pattern without pain  Rationale: to maximize safety and independence with performance of ADLs and functional tasks;to maximize safety and independence within the home;to maximize safety and independence within the community;to maximize safety and independence with transportation  Target Date: 09/08/23      Frequency of Treatment: 1x/week  Duration of Treatment: for 8 weeks    Recommended Referrals to Other Professionals: none  Education Assessment:        Risks and benefits of evaluation/treatment have been explained.   Patient/Family/caregiver agrees with Plan of Care.     Evaluation Time:     PT Eval, Low Complexity Minutes (40545): 20      Signing Clinician: Jaqueline Noland PT

## 2023-08-23 ENCOUNTER — OFFICE VISIT (OUTPATIENT)
Dept: SLEEP MEDICINE | Facility: CLINIC | Age: 63
End: 2023-08-23
Payer: COMMERCIAL

## 2023-08-23 VITALS
HEIGHT: 65 IN | OXYGEN SATURATION: 98 % | DIASTOLIC BLOOD PRESSURE: 86 MMHG | WEIGHT: 152.4 LBS | RESPIRATION RATE: 16 BRPM | BODY MASS INDEX: 25.39 KG/M2 | SYSTOLIC BLOOD PRESSURE: 128 MMHG | HEART RATE: 68 BPM

## 2023-08-23 DIAGNOSIS — G47.09 OTHER INSOMNIA: Primary | ICD-10-CM

## 2023-08-23 PROBLEM — H52.203 MYOPIA OF BOTH EYES WITH ASTIGMATISM AND PRESBYOPIA: Status: ACTIVE | Noted: 2020-07-08

## 2023-08-23 PROBLEM — H25.13 CATARACT, NUCLEAR SCLEROTIC, BOTH EYES: Status: ACTIVE | Noted: 2020-07-08

## 2023-08-23 PROBLEM — H35.363 DRUSEN OF MACULA OF BOTH EYES: Status: ACTIVE | Noted: 2020-07-08

## 2023-08-23 PROBLEM — H52.4 MYOPIA OF BOTH EYES WITH ASTIGMATISM AND PRESBYOPIA: Status: ACTIVE | Noted: 2020-07-08

## 2023-08-23 PROBLEM — H52.13 MYOPIA OF BOTH EYES WITH ASTIGMATISM AND PRESBYOPIA: Status: ACTIVE | Noted: 2020-07-08

## 2023-08-23 PROCEDURE — 99205 OFFICE O/P NEW HI 60 MIN: CPT | Performed by: INTERNAL MEDICINE

## 2023-08-23 ASSESSMENT — SLEEP AND FATIGUE QUESTIONNAIRES
HOW LIKELY ARE YOU TO NOD OFF OR FALL ASLEEP IN A CAR, WHILE STOPPED FOR A FEW MINUTES IN TRAFFIC: WOULD NEVER DOZE
HOW LIKELY ARE YOU TO NOD OFF OR FALL ASLEEP WHILE SITTING QUIETLY AFTER LUNCH WITHOUT ALCOHOL: WOULD NEVER DOZE
HOW LIKELY ARE YOU TO NOD OFF OR FALL ASLEEP WHILE SITTING INACTIVE IN A PUBLIC PLACE: SLIGHT CHANCE OF DOZING
HOW LIKELY ARE YOU TO NOD OFF OR FALL ASLEEP WHEN YOU ARE A PASSENGER IN A CAR FOR AN HOUR WITHOUT A BREAK: MODERATE CHANCE OF DOZING
HOW LIKELY ARE YOU TO NOD OFF OR FALL ASLEEP WHILE SITTING AND TALKING TO SOMEONE: WOULD NEVER DOZE
HOW LIKELY ARE YOU TO NOD OFF OR FALL ASLEEP WHILE LYING DOWN TO REST IN THE AFTERNOON WHEN CIRCUMSTANCES PERMIT: HIGH CHANCE OF DOZING
HOW LIKELY ARE YOU TO NOD OFF OR FALL ASLEEP WHILE SITTING AND READING: WOULD NEVER DOZE
HOW LIKELY ARE YOU TO NOD OFF OR FALL ASLEEP WHILE WATCHING TV: MODERATE CHANCE OF DOZING

## 2023-08-23 NOTE — PATIENT INSTRUCTIONS
CBT-I Introductory Module    Understanding Sleep and Insomnia    Most people have trouble sleeping at some point in their life.   Chronic insomnia means you have had trouble falling asleep and/or staying asleep for at least the past three months.  Despite allowing enough time for sleep, it is affecting how you feel. You are not alone.  It is estimated that 10-15% of adults experience chronic insomnia.     Cognitive Behavioral Therapy for Insomnia (CBT-I)    Consistent with the guidelines of the [employer], [facility] recommends  Cognitive Behavioral Therapy for Insomnia (CBT-I) as the first-line treatment for insomnia.  CBT-I targets factors that lead to long-term insomnia:    Behavioral Conditioning    When you lay awake in bed over many nights, your body actually becomes trained or 'conditioned' to be awake during the night.  It makes the bed associated with alertness instead of sleepiness.    Habits that weaken your body's sleep drive and circadian sleep rhythm    Changing sleep schedules, extended time in bed, using mobile devices and computers close to bedtime, and naps too late in the day can harm your sleep at night.    Emotional and Physical Arousal    Things such as worrying about sleep, stress, drinking too much caffeine, and not winding down before bed can interfere with your body's natural sleep drive.    Understanding Sleep and Insomnia    [facility] CBT-I is a four-part program that teaches you the skills needed for a better night's sleep. The first step in your program is learning a bit about sleep and insomnia.      The Basics of Sleep    How does sleep help us?    Sleep, like food and water, is something we need every day. The purpose of sleep is still not exactly clear, but sleep experts agree we need consistent quality sleep to function at our best. There is evidence that sleep helps maintain brain and body functions.  It helps maintain thinking ability  and mood.  Sleep is actually a very active part of life. Sleep occurs in four stages that cycle every  minutes throughout the night. We get our deepest sleep during the first few hours of sleep.  During the last half of our sleep, we usually get the bulk of our REM (Rapid Eye Movement) sleep.  REM sleep is when most of our dreaming occurs.    How much sleep do we need?    Sleep needs vary from person to person. Most adults need between 6-8 hours of sleep.  Sleeping too little or too much may be a health risk.  As we age, most people report their sleep gets lighter, earlier, shorter, and more restless.     What Controls Our Sleep?    The three things that regulate your sleep are your sleep drive, biological clock and your arousal system (emotional and physical).  Together these make us feel alert during the day and promote sleep at night.    Your sleep drive depends on how long you have been awake. It is lowest when you first wake up.  Sleep drive gradually increases as the day goes on.  The longer time you are awake the easier it is to fall asleep.  Sleeping gradually reduces your sleep drive. That is why napping in the evening or close to bed can make it harder to sleep at night.    Your biological clock promotes wakefulness during the day and sleep at night.          From Anup et al. (2009). Evaluation and Management of Insomnia in the Psychiatric setting. Published Online: 19/1/2009; DOI: https://doi.org/[email or web address]/[email or web address]      Understanding Insomnia    What causes insomnia?    Some people have a greater predisposition to developing insomnia due to genetics, personality or age. A host of things can trigger or precipitate it: jet lag, working a different shift, medication, or the onset of a medical or mental health condition.         Insomnia and Your Arousal System    Mental activity, emotions and physical symptoms can make your brain too active to sleep by masking the strength of your sleep  drive. Your brain's arousal system not only triggers insomnia, it plays a role in maintaining it as well.  Common sources of arousal include:    Worry about sleep in bed  An active mind concerned about unfinished tasks  Anxiety, Stress and Depression  Pain     What perpetuates insomnia?    Short-term insomnia can become chronic when you begin to feel fearful, worried and  on guard  about sleep loss. As you spend more time in bed or try forcing yourself to sleep your bed becomes linked with wakefulness.  This is why people with insomnia commonly report feeling tired before bed but suddenly more alert when getting into bed.  This type of  conditioning  along with unhelpful sleep habits maintains the insomnia even when the triggering event has resolved.    Tracking your Sleep    Sleep tracking is an essential part of training yourself to sleep better and monitoring your progress.  There are several ways to keep track of your sleep habits.     Insomnia  Marie    The Insomnia  marie is a convenient way to keep track of your sleep prior to and during treatment.  Simply download the free marie on your Apple or Android phone and record your information each morning.   The data you enter should be your recollection of the past nigh of sleep. Do not watch or monitor the clock in the middle of the night while keeping your sleep diary.     The marie also includes training and sleep schedule recommendations.  We recommend you use only the tracking function unless instructed by your provider. You can email your data to yourself prior to your visit by using the [place] User Data function found in the Settings Section.  It is important that you have your data available to review with your provider at the time of your visit.             [employer] Sleep Diary    You can also track your sleep using the [employer] paper sleep diary.  You can upload your sleep diary and send it via a Tray message  or have it with you  at the time of your visit.        Mobile and Wearable Sleep Tracking Devices    There are many sleep tracking devices and mobile applications that estimate the timing and quantity of sleep by measuring body movement.  Though many of these devices claim to measure the depth or stages of sleep, they cannot measure brain wave activity or other indicators required to determine the actual stages of sleep.  They are helpful in estimating the timing and total amount of time you sleep.    CBT-I and Sleeping Pills    Sleep medications can be helpful in the short-term but often stop working in the long-term.  Sleeping pills treat symptoms and not the underlying cause of insomnia.  They also can have side effects that last well into the day. Abruptly stopping sleeping pills can cause temporary rebound insomnia and lead to increased distress about sleeplessness.  This in turn strengthens the belief that pills are necessary for sleep.    Many patients choose to discontinue sleeping pills prior to beginning CBT-I.  You should talk to your prescribing provider before tapering or discontinuing sleep medication.            Online CBT-I       Research indicates that online CBT-I can be as effective as in-person therapy for motivated patients. It requires comfort with online learning and confidence  that making changes in sleep habits can improve your sleep.    Online CBT-I is not for everyone.  Contraindications include individuals with seizure disorders, bipolar disorder, unstable medical or mental health conditions,  risk of falling, pregnancy or current use of sleep medication.    Our online CBT-I program is a collaboration between our [facility] and the [facility]. Different from other online wellness programs, patients have the option to also incorporate live virtual or in-person follow-up to the program.     The cost for an entire 6 week program is $40.       The online CBT-I program is formatted  "for use on computers and mobile devices.    To get started, copy and paste the link below which will take you to the landing page to register:                           [email or web address]           If you wish to complete the online CBT-I program but do not plan to follow-up with a sleep provider, you are set to begin the program.      If you are planning to work with an [facility] sleep provider, there are a couple of extra steps you can take to share your sleep logs with your sleep provider.  To share sleep log data, go to the left side navigation and click on the  share sleep log  button:             You will be taken to the page below where you will enter  the provider code [email or web address] into the box.                   Once you press the locate button, the information for [facility] will pop up.  By pressing the \"Submit\" button your   data will be sent to our  secure [facility] sleep program portal for review by yourprovider. You will only need to do this step once.             "

## 2023-08-23 NOTE — PROGRESS NOTES
Name: Ruthann Beck MRN# [id number]   Age: 63 year old YOB: 1960     Date of Consultation: August 23, 2023  Consultation is requested by: No referring provider defined for this encounter. No ref. provider found  Primary care provider: Glory Bass       Reason for Sleep Consult:     Ruthann Beck is sent by No ref. 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 I do not sleep well. I hear buzzing in my head before sleep and when I haven't slept well. There is a lot of alarming news about poor sleep and mental decline which I want to address.  Patient states problem(s) started: 2000  Ruthann Beck's goals for this visit: Get the only treatment that is supposed to be effective,some kind of cognitive behaviorl therapy.           Assessment and Plan:     Summary Sleep Diagnoses:  Insomnia (ANNE MARIE 19)    Comorbid Diagnoses:  Chronic back pain with lumbar radiculopathy  Cataracts and visual disturbances    Summary Recommendations(things to be done):  Anxiety     Summary Counseling (items discussed):    We discussed chunked sequenced learning of behavioral habits through cognitive behavioral therapy for insomnia to address aspects of sleep health including circadian alignment, cognitive restructuring, stimulus control and possibly consolidation of time in bed.  None of these interventions were made this visit.  This patient is interested in cognitive behavioral therapy online in preparation for her referral to sleep psychology and was provided with self learning materials regarding cognitive behavioral therapy including online option.           HISTORY PRESENT ILLNESS:     Ruthann Beck is a 63 year old year old with consistent insomnia complaint for 5 years and prior history of early night shift work as a [id number] and  daytime sleeping 2--7 am with daytime napping with natural sleep time 10 am - 6am and recognition of the onset of difficulty with sleep at that time. She has had anxiety since adolescence with suicide attempt and acknowledges significant stressors in the home related to mothers recurrent serious mental health issues.  She denies nightmares related to traumas.  She is denying depressive symptoms currently but does acknowledge anxiety, aimless rumination and intention to fall asleep.  She is very well-informed and insomnia through her readings and attempts to use over-the-counter sleep promoting agents.  At this point she understands a sleep promoting agents are not appropriate long-term or durable and is interested in exploring cognitive behavioral therapy for insomnia.  She does not have coexisting causes for insomnia and denies symptoms of restless leg syndrome or sleep apnea.           SLEEP-WAKE SCHEDULE:      Work/School Days: Patient goes to school/work: Yes   Usually gets into bed at 9 and   Takes patient about varies. I read for one hour or until I'm drowsy,sometimes I fall asleep immediately,sometimes I can't fall asleep and sometimes I wake back up in 20minutes and can't fall asleep again for an hour. to fall asleep  Has trouble falling asleep 3 nights per week in quiet dark room with white noise from 's CPAP   Wakes up in the middle of the night varies from 1 to 5 or 6 times.  Wakes up due to Anxiety  She has trouble falling back asleep 5 times a week.   It usually takes from 30 minutes to 2 hours to get back to sleep using breathing and centering staying bed not referencing sleep intermittent rumination   Patient is usually up at 6  Uses alarm: Yes    Weekends/Non-work Days/All Other Days:  Usually gets into bed at 9   Takes patient about same as week day to fall asleep  Patient is usually up at 6 to 7  Uses alarm: No    Sleep Need  Patient gets  6 hours sleep on average   Patient thinks she needs  about 8 sleep    Ruthann Beck prefers to sleep in this position(s): Back, Side, Stomach   Patient states they do the following activities in bed: Read    Naps  Patient takes a purposeful nap 1 to 2 times times a week and naps are usually 15 min in duration  She feels better after a nap: Yes  She dozes off unintentionally 0 days per week  Patient has had a driving accident or near-miss due to sleepiness/drowsiness: No      SLEEP DISRUPTIONS:    Breathing/Snoring    Snoring:No  Other people complain about her snoring: No  Others observeshe stops breathing in her sleep: No  She has issues with the following:      Movement:    Pain, discomfort, with an urge to move:  Yes  Happens when she is resting:  No  Happens more at night:  Yes  Patient has been told she kicks her legs at night:  No     Behaviors in Wakefulness/Sleep:    Dream enactment   No  Sleep eating   No  Bruxism  No                    Ruthann Beck has experienced the following behaviors while sleeping:    She has experienced sudden muscle weakness during the day: No      Is there anything else you would like your sleep provider to know: I tried to improve my sleep with CBD, melatonin/diphenhydramine,exercise,visualization and it is improving slightly but sometimes its very depressing to go to bed and sleep so poorly. She has several books on insomnia      CAFFEINE AND OTHER SUBSTANCES:    Patient consumes caffeinated beverages per day:  2  Last caffeine use is usually: 9 am  List of any prescribed or over the counter stimulants that patient takes: none  List of any prescribed or over the counter sleep medication patient takes: none  List of previous sleep medications that patient has tried: CBD and melatonin  Patient drinks alcohol to help them sleep: No  Patient drinks alcohol near bedtime: No    Family History:  Patient has a family member been diagnosed with a sleep disorder: No                 SCALES:       EPWORTH SLEEPINESS  SCALE         8/23/2023     8:04 AM    Saint Thomas Sleepiness Scale ( AMELIA Conde  [id number]<br>ESS - [place]/English - Final version - 21 Nov 07 - [facility] [place].)   Sitting and reading Would never doze   Watching TV Moderate chance of dozing   Sitting, inactive in a public place (e.g. a theatre or a meeting) Slight chance of dozing   As a passenger in a car for an hour without a break Moderate chance of dozing   Lying down to rest in the afternoon when circumstances permit High chance of dozing   Sitting and talking to someone Would never doze   Sitting quietly after a lunch without alcohol Would never doze   In a car, while stopped for a few minutes in traffic Would never doze   [facility] Thomas Score (MC) 8   [facility] Thomas Score (Sleep) 8         INSOMNIA SEVERITY INDEX (ANNE MARIE)          8/23/2023     7:34 AM   Insomnia Severity Index (ANNE MARIE)   Difficulty falling asleep 2   Difficulty staying asleep 3   Problems waking up too early 3   How SATISFIED/DISSATISFIED are you with your CURRENT sleep pattern? 4   How NOTICEABLE to others do you think your sleep problem is in terms of impairing the quality of your life? 1   How WORRIED/DISTRESSED are you about your current sleep problem? 4   To what extent do you consider your sleep problem to INTERFERE with your daily functioning (e.g. daytime fatigue, mood, ability to function at work/daily chores, concentration, memory, mood, etc.) CURRENTLY? 2   ANNE MARIE Total Score 19       Guidelines for Scoring/Interpretation:  Total score categories:  0-7 = No clinically significant insomnia   8-14 = Subthreshold insomnia   15-21 = Clinical insomnia (moderate severity)  22-28 = Clinical insomnia (severe)  Used via courtesy of www.[employer].[place].gov with permission from Feroz Lagunas PhD., [facility]      STOP BANG         8/23/2023     8:04 AM   STOP BANG Questionnaire (  2008, the American Society of Anesthesiologists, Inc. [employer]   1. Snoring - Do you snore loudly  (louder than talking or loud enough to be heard through closed doors)? No   2. Tired - Do you often feel tired, fatigued, or sleepy during daytime? Yes   3. Observed - Has anyone observed you stop breathing during your sleep? No   4. Blood pressure - Do you have or are you being treated for high blood pressure? No   5. BMI - BMI more than 35 kg/m2? No   6. Age - Age over 50 yr old? Yes   7. Neck circumference - Neck circumference greater than 40 cm? No   8. Gender - Gender male? No   STOP BANG Score (MC): 3 (High risk of BELLO)         PATIENT HEALTH QUESTIONNAIRE-9 (PHQ - 9)        7/29/2021     8:59 AM   PHQ-9 ([employer])   1.  Little interest or pleasure in doing things 1   2.  Feeling down, depressed, or hopeless 1   3.  Trouble falling or staying asleep, or sleeping too much 3   4.  Feeling tired or having little energy 3   5.  Poor appetite or overeating 3   6.  Feeling bad about yourself - or that you are a failure or have let yourself or your family down 1   7.  Trouble concentrating on things, such as reading the newspaper or watching television 2   8.  Moving or speaking so slowly that other people could have noticed. Or the opposite - being so fidgety or restless that you have been moving around a lot more than usual 1   9.  Thoughts that you would be better off dead, or of hurting yourself in some way 0   PHQ-9 Total Score 15   If you checked off any problems, how difficult have these problems made it for you to do your work, take care of things at home, or get along with other people? Somewhat difficult   6.  Feeling bad about yourself 1   7.  Trouble concentrating 2   8.  Moving slowly or restless 1   9.  Suicidal or self-harm thoughts 0   Difficulty at work, home, or with people Somewhat difficult       Developed by Shaylee Townsend, Jojo Stark, Javier Wiggins and colleagues, with an educational pablo from [employer] No permission required to reproduce, translate, display or  distribute.        Allergies:    Allergies   Allergen Reactions    Bee Venom      swelling    No Known Drug Allergy             Problem List:     Patient Active Problem List   Diagnosis    Lumbar radiculopathy    CARDIOVASCULAR SCREENING; LDL GOAL LESS THAN 160    Common wart    Hip pain    Left knee pain    Right knee pain    Lumbar facet arthropathy    Lumbar degenerative disc disease    Chronic right-sided low back pain without sciatica    Cataract, nuclear sclerotic, both eyes    Myopia of both eyes with astigmatism and presbyopia    Drusen of macula of both eyes            MEDICATIONS:     Current Outpatient Medications   Medication Sig Dispense Refill    progesterone (PROMETRIUM) 100 MG capsule Take 100 mg by mouth daily      amoxicillin-clavulanate (AUGMENTIN) 875-125 MG tablet Take 1 tablet by mouth 2 times daily 20 tablet 0    celecoxib (CELEBREX) 100 MG capsule Take 1 capsule (100 mg) by mouth 2 times daily 100 capsule 1       Problem List:  Patient Active Problem List    Diagnosis Date Noted    Cataract, nuclear sclerotic, both eyes 07/08/2020     Priority: Medium    Myopia of both eyes with astigmatism and presbyopia 07/08/2020     Priority: Medium    Drusen of macula of both eyes 07/08/2020     Priority: Medium    Chronic right-sided low back pain without sciatica 10/02/2019     Priority: Medium    Lumbar facet arthropathy 09/04/2019     Priority: Medium    Lumbar degenerative disc disease 09/04/2019     Priority: Medium    Left knee pain 07/22/2016     Priority: Medium    Right knee pain 07/22/2016     Priority: Medium    Hip pain 10/05/2011     Priority: Medium    Common wart 08/05/2011     Priority: Medium     right thumb web      CARDIOVASCULAR SCREENING; LDL GOAL LESS THAN 160 05/09/2010     Priority: Medium    Lumbar radiculopathy 08/13/2008     Priority: Medium        Past Medical/Surgical History:  Past Medical History:   Diagnosis Date    NO ACTIVE PROBLEMS      Past Surgical History:    Procedure Laterality Date    COLONOSCOPY N/A 7/14/2020    Procedure: COLONOSCOPY;  Surgeon: Kaylin Gibson MD;  [facility]    COLONOSCOPY N/A 7/14/2020    Procedure: Colonoscopy, With Polypectomy And Biopsy;  Surgeon: Kaylin Gibson MD;  Location: [facility] OR    CRYOCAUTERY OF CERVIX  1982    HC EXCISION OF LINGUAL TONSIL      ZZC APPENDECTOMY  9/09    ZZC LEG/ANKLE SURGERY PROC UNLISTED  1980    fx ankle, pins and screws placed       Social History:  Social History     Socioeconomic History    Marital status:      Spouse name: Alon    Number of children: 2    Years of education: Not on file    Highest education level: Not on file   Occupational History    Occupation: baker     Employer: [employer]     Comment: [employer]   Tobacco Use    Smoking status: Never    Smokeless tobacco: Never   Substance and Sexual Activity    Alcohol use: Yes     Comment: 1 beer or glass of wine a day    Drug use: No    Sexual activity: Yes     Partners: Male   Other Topics Concern     Service No    Blood Transfusions No    Caffeine Concern Yes     Comment: 2 cups of coffee a day    Occupational Exposure No    Hobby Hazards No    Sleep Concern No    Stress Concern No    Weight Concern No    Special Diet No    Back Care No    Exercise Yes     Comment: runs 5 times a week 2 to 3 miles each time    Bike Helmet Yes    Seat Belt Yes    Self-Exams No    Parent/sibling w/ CABG, MI or angioplasty before 65F 55M? No   Social History Narrative    Balanced Diet - Yes    Osteoporosis Prevention Measures - Dairy servings per day: 2 to 3 servings daily    Regular Exercise -  Yes Describe swims twice a week for 1 mile    Dental Exam - YES - Date: 3 months ago    Eye Exam - YES - Date: 1 year ago    Self Breast Exam - No    Abuse: Current or Past (Physical, Sexual or Emotional)- No    Do you feel safe in your environment - Yes    Guns stored in the home - No    Sunscreen used - No    Seatbelts used - Yes    Lipids -  NO     Glucose -  YES - Date: 3-05    Colon Cancer Screening - No    Hemoccults - NO    Pap Test -  YES - Date: 1 year ago, does at ob/gyn, jhx of abnormal pap, cryosurgery    Do you have any concerns about STD's -  No    Mammography - YES - Date:     DEXA - NO    Immunizations reviewed and up to date - thinks it's been about 10 years since her last td.    08  Y[facility]ILE[facility]     Social Determinants of Health     Financial Resource Strain: Not on file   Food Insecurity: Not on file   Transportation Needs: Not on file   Physical Activity: Not on file   Stress: Not on file   Social Connections: Not on file   Intimate Partner Violence: Not on file   Housing Stability: Not on file       Family History:  Family History   Problem Relation Age of Onset    Depression Mother         manic depressive    Hypertension Father     Cardiovascular Father         stroke  at age 71    Arthritis Father         gout    Diabetes Maternal Grandmother     Breast Cancer Paternal Grandmother     Family History Negative Sister     Family History Negative Sister     Family History Negative Sister     Family History Negative Brother     Family History Negative Brother     Melanoma No family hx of        Review of Systems:  A complete review of systems reviewed by me is negative with the exeption of what has been mentioned in the history of present illness.  In the last TWO WEEKS have you experienced any of the following symptoms?  Fevers: No  Night Sweats: No  Weight Gain: No  Pain at Night: Yes  Double Vision: No  Changes in Vision: No  Difficulty Breathing through Nose: No  Sore Throat in Morning: No  Dry Mouth in the Morning: No  Shortness of Breath Lying Flat: No  Shortness of Breath With Activity: No  Awakening with Shortness of Breath: No  Increased Cough: No  Heart Racing at Night: No  Swelling in Feet or Legs: No  Diarrhea at Night: No  Heartburn at Night: No  Urinating More than Once at Night: Yes  Losing Control of Urine at  "Night: No  Joint Pains at Night: No  Headaches in Morning: No  Weakness in Arms or Legs: No  Depressed Mood: Yes  Anxiety: Yes          Physical Examination:     Vitals: /86   Pulse 68   Resp 16   Ht 1.651 m (5' 5\")   Wt 69.1 kg (152 lb 6.4 oz)   LMP 04/05/2010   SpO2 98%   BMI 25.36 kg/m    BMI= Body mass index is 25.36 kg/m .  Mandibular profile    GENERAL: Healthy, alert and no distress  EYES: Eyes grossly normal to inspection.  No discharge or erythema, or obvious scleral/conjunctival abnormalities.  RESP: No audible wheeze, cough, or visible cyanosis.  No visible retractions or increased work of breathing.    SKIN: Visible skin clear. No significant rash, abnormal pigmentation or lesions.  NEURO: Cranial nerves grossly intact.  Mentation and speech appropriate for age.  PSYCH: Mentation appears normal, affect normal/bright, judgement and insight intact, normal speech and appearance well-groomed.    Neck Cir (cm): 32 cm             Data: All pertinent previous laboratory data reviewed     Recent Labs   Lab Test 06/06/23  0728 02/27/20  0815     --    POTASSIUM 4.3  --    CHLORIDE 107  --    CO2 26  --    ANIONGAP 7  --    GLC 95 88   BUN 14.2  --    CR 0.85  --    KEVAN 9.3  --        Recent Labs   Lab Test 06/06/23  0728   WBC 6.0   RBC 4.42   HGB 13.4   HCT 39.7   MCV 90   MCH 30.3   MCHC 33.8   RDW 12.7          Recent Labs   Lab Test 06/06/23 0728   PROTTOTAL 7.0   ALBUMIN 4.2   BILITOTAL 0.4   ALKPHOS 74   AST 20   ALT 17       TSH (uIU/mL)   Date Value   06/06/2023 3.21         BELLA WINSLOW MD 8/23/2023     Total time spent reviewing medical records including previous testing and interpretation as well as direct patient contact and documentation on this date: 60 minutes      "

## 2023-10-30 ENCOUNTER — OFFICE VISIT (OUTPATIENT)
Dept: DERMATOLOGY | Facility: CLINIC | Age: 63
End: 2023-10-30
Payer: COMMERCIAL

## 2023-10-30 DIAGNOSIS — D23.9 DERMATOFIBROMA: ICD-10-CM

## 2023-10-30 DIAGNOSIS — D18.01 CHERRY ANGIOMA: ICD-10-CM

## 2023-10-30 DIAGNOSIS — L73.9 FOLLICULITIS: ICD-10-CM

## 2023-10-30 DIAGNOSIS — L81.4 SOLAR LENTIGO: ICD-10-CM

## 2023-10-30 DIAGNOSIS — D22.9 MULTIPLE MELANOCYTIC NEVI: Primary | ICD-10-CM

## 2023-10-30 PROCEDURE — 99213 OFFICE O/P EST LOW 20 MIN: CPT | Performed by: DERMATOLOGY

## 2023-10-30 NOTE — LETTER
10/30/2023         RE: Ruthann Beck  [address]  [place] [address]        Dear Colleague,    Thank you for referring your patient, Ruthann Beck, to the [facility]. Please see a copy of my visit note below.    [facility] Dermatology Note    Encounter Date: Oct 30, 2023    Dermatology Problem List:   1. Multiple benign appearing nevi on arms, legs and trunk. Discussed ABCDEs of melanoma and sunscreen.    2. Multiple lentigos on arms, legs and trunk. Advised benign, no treatment needed.  Multiple scattered angiomas. Advised benign, no treatment needed.   Seborrheic keratosis on arms, legs and trunk. Advised benign, no treatment needed.  Atypical nevus on the left abdomen. Shave biopsy performed.  Area cleaned and anesthetized with 1% lidocaine with epinephrine.  Dermablade used to remove the lesion and sent to pathology. Bleeding was cauterized. Pt tolerated procedure well with no complications.        ______________________________________    Impression/Plan:  Reassurance provided for benign lesions not treated today including cherry angiomata, solar lentigines, dermatofibroma, and banal-appearing melanocytic nevi.   Folliculitis on buttocks  - mild, patient defers treatment      Follow-up in 2 years.      Staff Involved:  Staff and Scribe    I, Jacqueline Cai, am serving as a scribe; to document services personally performed by Jeremy Briceno MD -based on data collection and the provider's statements to me.       CC:   Chief Complaint   Patient presents with     Skin Check     FBSE.  No areas of concern.  No personal or family HX of skin cancer.       History of Present Illness:  Ms. Ruthann Beck is a 63 year old female who presents as a return patient. She is here for a full body skin check today, reports no spots of concern.       Labs:  N/A    Physical exam:  Vitals: LMP 04/05/2010   GEN: This is a well developed,  well-nourished female in no acute distress, in a pleasant mood.    SKIN: Cardenas phototype 2  - Full skin, which includes the head/face, both arms, chest, back, abdomen,both legs, genitalia and/or groin buttocks, digits and/or nails, was examined.  - There are dome shaped bright red papules on the head/neck, trunk, extremities.   - Multiple regular brown pigmented macules and papules are identified on the head/neck, trunk, extremities.   - Scattered brown macules on sun exposed areas.  - Tan papule on R shoulder blade  - Folliculocentric papule on R buttocks  - No other lesions of concern on areas examined.     Past Medical History:   Past Medical History:   Diagnosis Date     NO ACTIVE PROBLEMS      Past Surgical History:   Procedure Laterality Date     COLONOSCOPY N/A 2020    Procedure: COLONOSCOPY;  Surgeon: Kaylin Gibson MD;  [facility]     COLONOSCOPY N/A 2020    Procedure: Colonoscopy, With Polypectomy And Biopsy;  Surgeon: Kaylin Gibson MD;  Location: [facility] OR     CRYOCAUTERY OF CERVIX       HC EXCISION OF LINGUAL TONSIL       [facility] APPENDECTOMY       [facility] LEG/ANKLE SURGERY PROC UNLISTED      fx ankle, pins and screws placed       Social History:   reports that she has never smoked. She has never used smokeless tobacco. She reports current alcohol use. She reports that she does not use drugs.    Family History:  Family History   Problem Relation Age of Onset     Depression Mother         manic depressive     Hypertension Father      Cardiovascular Father         stroke  at age 71     Arthritis Father         gout     Diabetes Maternal Grandmother      Breast Cancer Paternal Grandmother      Family History Negative Sister      Family History Negative Sister      Family History Negative Sister      Family History Negative Brother      Family History Negative Brother      Melanoma No family hx of        Medications:  Current Outpatient Medications   Medication Sig Dispense  Refill     progesterone (PROMETRIUM) 100 MG capsule Take 100 mg by mouth daily       Allergies   Allergen Reactions     Bee Venom      swelling     No Known Drug Allergy                     Again, thank you for allowing me to participate in the care of your patient.        Sincerely,        Jeremy Briceno MD

## 2023-10-30 NOTE — NURSING NOTE
Ruthann Beck's goals for this visit include:   Chief Complaint   Patient presents with    Skin Check     FBSE.  No areas of concern.  No personal or family HX of skin cancer.      She requests these members of her care team be copied on today's visit information:     PCP: Glory Bass Y    Referring Provider:  No referring provider defined for this encounter.    LMP 04/05/2010     Do you need any medication refills at today's visit?     Caroline Cummings on 10/30/2023 at 8:23 AM

## 2023-10-30 NOTE — PROGRESS NOTES
[facility] Dermatology Note    Encounter Date: Oct 30, 2023    Dermatology Problem List:   1. Multiple benign appearing nevi on arms, legs and trunk. Discussed ABCDEs of melanoma and sunscreen.    2. Multiple lentigos on arms, legs and trunk. Advised benign, no treatment needed.  Multiple scattered angiomas. Advised benign, no treatment needed.   Seborrheic keratosis on arms, legs and trunk. Advised benign, no treatment needed.  Atypical nevus on the left abdomen. Shave biopsy performed.  Area cleaned and anesthetized with 1% lidocaine with epinephrine.  Dermablade used to remove the lesion and sent to pathology. Bleeding was cauterized. Pt tolerated procedure well with no complications.        ______________________________________    Impression/Plan:  Reassurance provided for benign lesions not treated today including cherry angiomata, solar lentigines, dermatofibroma, and banal-appearing melanocytic nevi.   Folliculitis on buttocks  - mild, patient defers treatment      Follow-up in 2 years.      Staff Involved:  Staff and Scribe    I, Jacqueline Cai, am serving as a scribe; to document services personally performed by Jeremy Briceno MD -based on data collection and the provider's statements to me.       CC:   Chief Complaint   Patient presents with    Skin Check     FBSE.  No areas of concern.  No personal or family HX of skin cancer.       History of Present Illness:  Ms. Ruthann Beck is a 63 year old female who presents as a return patient. She is here for a full body skin check today, reports no spots of concern.       Labs:  N/A    Physical exam:  Vitals: LMP 04/05/2010   GEN: This is a well developed, well-nourished female in no acute distress, in a pleasant mood.    SKIN: Cardenas phototype 2  - Full skin, which includes the head/face, both arms, chest, back, abdomen,both legs, genitalia and/or groin buttocks, digits and/or nails, was examined.  - There are dome shaped  bright red papules on the head/neck, trunk, extremities.   - Multiple regular brown pigmented macules and papules are identified on the head/neck, trunk, extremities.   - Scattered brown macules on sun exposed areas.  - Tan papule on R shoulder blade  - Folliculocentric papule on R buttocks  - No other lesions of concern on areas examined.     Past Medical History:   Past Medical History:   Diagnosis Date    NO ACTIVE PROBLEMS      Past Surgical History:   Procedure Laterality Date    COLONOSCOPY N/A 2020    Procedure: COLONOSCOPY;  Surgeon: Kaylin Gibson MD;  Location: [facility] OR    COLONOSCOPY N/A 2020    Procedure: Colonoscopy, With Polypectomy And Biopsy;  Surgeon: Kaylin Gibson MD;  Location: [facility] OR    CRYOCAUTERY OF CERVIX      HC EXCISION OF LINGUAL TONSIL      Z APPENDECTOMY      Z LEG/ANKLE SURGERY PROC UNLISTED      fx ankle, pins and screws placed       Social History:   reports that she has never smoked. She has never used smokeless tobacco. She reports current alcohol use. She reports that she does not use drugs.    Family History:  Family History   Problem Relation Age of Onset    Depression Mother         manic depressive    Hypertension Father     Cardiovascular Father         stroke  at age 71    Arthritis Father         gout    Diabetes Maternal Grandmother     Breast Cancer Paternal Grandmother     Family History Negative Sister     Family History Negative Sister     Family History Negative Sister     Family History Negative Brother     Family History Negative Brother     Melanoma No family hx of        Medications:  Current Outpatient Medications   Medication Sig Dispense Refill    progesterone (PROMETRIUM) 100 MG capsule Take 100 mg by mouth daily       Allergies   Allergen Reactions    Bee Venom      swelling    No Known Drug Allergy

## 2024-02-29 ENCOUNTER — VIRTUAL VISIT (OUTPATIENT)
Dept: SLEEP MEDICINE | Facility: CLINIC | Age: 64
End: 2024-02-29
Attending: INTERNAL MEDICINE
Payer: COMMERCIAL

## 2024-02-29 VITALS — BODY MASS INDEX: 24.99 KG/M2 | HEIGHT: 65 IN | WEIGHT: 150 LBS

## 2024-02-29 DIAGNOSIS — G47.09 OTHER INSOMNIA: ICD-10-CM

## 2024-02-29 DIAGNOSIS — F51.04 CHRONIC INSOMNIA: Primary | ICD-10-CM

## 2024-02-29 PROCEDURE — 99214 OFFICE O/P EST MOD 30 MIN: CPT | Mod: 95 | Performed by: PSYCHOLOGIST

## 2024-02-29 NOTE — PROGRESS NOTES
Virtual Visit Details    Type of service:  Video Visit     Originating Location (pt. Location): Home    Distant Location (provider location):  On-site  Platform used for Video Visit: AmWell  Visit Start Time:  2:40 PM  Visit End Time: 3:10 PM      SLEEP MEDICINE CONSULTATION  Sleep Psychology  2024    Name: Ruthann Beck MRN# [id number]   Age: 63 year old YOB: 1960     Date of Consultation: 2024  Consultation is requested by: Warren Garner MD  [address] [place] [id number]  Primary care provider: Glory Bass    Reason for Sleep Consultation     Ruthann Beck is a 63 year old female seen today for a behavioral sleep medicine consultation because of insomnia    Assessment and Plan     Sleep Diagnoses:         Chronic insomnia    Co-occurring Conditions:         Lumbar degenerative disc disease       Hip Pain       Hyperlipidemia    Clinical Impressions:    Ruthann Beck was seen for a sleep psychology consultation and possible behavioral sleep intervention and treatment. History and clinical presentation suggests chronic psychophysiologic insomnia associated with co-occurring stress/anxiety.  Patient was initially seen by Dr. Warren Garner, sleep medicine who recommended patient to behavioral sleep medicine consultation and to course of our online CBT-I program patient has had an excellent response to online CBT I with a clinically significant reduction in insomnia symptoms, now in the subclinical range.  She also reports that the program is providing skills and strategies for general anxiety management as well.    Recommendations and Counselin.  Recommend continued focus on establishing and maintaining a consistent wake time of 5:30 AM and limiting sleep window to no more than 8 hours.    2.  Continue to practice stimulus control and relaxation strategies learned in her online program.    3.  Patient is interested in pursuing  further mindfulness based strategies to enhance anxiety management and as an adjunct to managing insomnia.    Ruthann was provided information about the pathophysiology of insomnia, psychophysiological factors contributing to the onset and maintenance of insomnia and how co-occurring medical conditions and intrinsic sleep disorders can affect sleep.  Treatment options were discussed  as applicable to patient specific sleep concerns and symptoms. The benefits and potential early side effects of treatment including increased daytime sleepiness were discussed.     Patient was advised to consult with their prescribing provider around use of or changes to prescription sleep medication.  Patient was counseled on the importance of avoiding driving if drowsy.    Services provided are compliant with the requirements of [place] Statute SS 256B.0625 Subd. 3b and paragraph (b)     Follow-up: as needed if symptoms recur     History of Present Sleep Complaint     Ruthann Beck is a 63 year old year old female who presents with a 10-year history of an insomnia with difficulty both initiating and maintaining sleep.  She is sleep medicine consultation and symptoms worsened over the last few years..    Initial Sleep Medicine Consultation with Dr. Warren Garner indicated low pretest probability of BELLO.  Patient was diagnosed with an unspecified insomnia and Anxiety State    She reports that she completed the online CBTI program and has fould it very helpful in improving sleep quality,    She gained insight that use of alcohol has significant effect.    Activities in bed: Read    Prescribed or OTC stimulants:  None    Prescribed or OTC sleep medication: b=none    Previous sleep medications patients has tried: benedryl,doxylamine succinate, dyphenhydramine, CBD    SLEEP-WAKE SCHEDULE:    Work/School Days: Patient goes to school/work: Yes     Time to Bed:  9    Sleep Latency: I read for 15 min.  fall asleep in 10 min or less  to fall asleep    Difficulty falling asleep:  1 or 2 nights per week    Number of awakenings: 2-3-4 times per night due to Pain, External stimuli (bed partner, pets, noise, etc), Anxiety    Trouble falling back asleep 2-3  times a week     Usually takes 10 min to 2 hours to get back to sleep              Rise time: 5 to 6 AM. sometimes I can sleep until 8    Uses alarm? Yes    Weekends/Non-work Days/All Other Days    Usually gets into bed at 9     Sleep latency:  10 minutes usually     Rise time: 6 to 8 am    Uses alarm? No    Patient sleep estimates:    Average total sleep time:  8 hours     Patient estimate of sleep need: 8 hours    Preferred sleep position(s): Back, Side, Stomach     Naps    Intentional naps: 0 times a week    Duration:  none in duration    Feels better after a nap: Yes    Unintentional [place]zin days per week    Driving accident or near-miss due to sleepiness/drowsiness? Yes    SLEEP DISRUPTIONS:    Breathing/Snoring    Patient snores:No    Other people complain about Her snoring: No    Patient has been told She stops breathing in Her sleep: No    She has issues with any of the following:      Movement:    Patient gets pain, discomfort, with an urge to move: No    Symptoms occur when She is resting: No    Symptoms occur more at night:No    Patient has been told She kicks Her legs at night:No     Behaviors in Sleep:    Ruthann Beck has experienced the following behaviors while sleeping:      She has experienced sudden muscle weakness during the day: No    Caffeine, Alcohol and Other Substances:    Number of caffeinated beverages(per day: 2    Last caffeine use is usually: 8 am    Uses alcohol to promote sleep: No    Drinks alcohol near bedtime: No      FAMILY HISTORY OF SLEEP DISORDERS    Patient has a family member been diagnosed with a sleep disorder: Yes  spouse: sleep apnea, uses CPAP           SCALES      EPWORTH SLEEPINESS SCALE    Likeliness of dozing or falling   asleep:    Sitting and reading: Moderate chance of dozing    Watching TV: Moderate chance of dozing    Sitting, inactive in a public place (e.g. a theatre or a meeting): Would never doze    As a passenger in a car for an hour without a break: High chance of dozing    Lying down to rest in the afternoon when circumstances permit: Slight chance of dozing    Sitting and talking to someone: Would never doze    Sitting quietly after a lunch without alcohol: Slight chance of dozing    In a car, while stopped for a few minutes in traffic: Would never doze    Total score - [place]: 9      INSOMNIA SEVERITY INDEX (ANNE MARIE)      The Insomnia Severity Index measures the severity of insomnia symptoms over the past two weeks.    1. Difficulty falling asleep: Mild    2. Difficulty staying asleep: Moderate    3. Problems waking up too early: Moderate    4. How SATISFIED/DISSATISFIED are youwith your CURRENT sleep pattern? Moderately Satisfied    5. How NOTICEABLE to others do you think your sleep problem is in terms of impairing the quality of your life? A Little      6. How WORRIED/DISTRESSED are you about your sleep problem? A Little    7. To what extent do you consider your sleep problem to INTERFERE with your daily functioning (e.g. daytime fatigue, mood, ability to fun[place]on at work/daily chores, concentration, memory, mood, etc.) CURRENTLY?   A Little     ANNE MARIE Total Score: 10    Guidelines for Scoring/Interpretation:   0-7 = No clinically significant insomnia   8-14 = Subthreshold insomnia   15-21 = Clinical insomnia (moderate severity)  22-28 = Clinical insomnia (severe)      STOP BANG     1. Snoring: Do you snore loudly (louder than talking or loud enough to be heard through closed doors)?  No    2. Tired: Do you often feel tired, fatigued, or sleepy during daytime?  No    3. Observed: Has anyone observed you stop breathing during your sleep?  No    4. Blood pressure: Do you have or are you being treated for high blood  pressure?  No    5. BMI: BMI more than 35 kg/m2?  No    6. Age: Age over 50 yr old?  Yes    7. Neck circumference: Neck circumference greater than 40 cm?  No    8. Gender: Gender male?  No    STOP-BANG Total Score: 1    BELLO Risk  0-2 Low risk for BELLO  3-4  Intermediate risk for BELLO  5-8 High risk    PHQ-9     Over the last 2 weeks, how often have you been bothered by any of the following problems?    1. Little interest or pleasure in doing things -      2. Feeling down, depressed, or hopeless -      3. Trouble falling or staying asleep, or sleeping too much -     4. Feeling tired or having little energy -      5. Poor appetite or overeating -      6. Feeling bad about yourself - or that you are a failure or have let yourself or your family down -      7. Trouble concentrating on things, such as reading the newspaper or watching television -     8. Moving or speaking so slowly that other people could have noticed? Or the opposite - being so fidgety or restless that you have been moving around a lot more than usual     9. Thoughts that you would be better off dead or of hurting  yourself in some way     Total Score:       If you checked off any problems, how difficult have these problems made it for you to do your work, take care of things at home, or get along with other people?      Developed by Shaylee Townsend, Jojo Stark, Javier Wiggins and colleagues, with an educational pablo from [employer] No permission required to reproduce, translate, display or distribute. permission required to reproduce, translate, display or distribute.     ROMÁN-7         No data to display                Previous Sleep Consultations/Studies     Initial Sleep Medicine Consultation with Dr. Warren Garner  on 8/23/23 indicated low pretest probability of BELLO.  Patient was diagnosed with an unspecified insomnia and Anxiety State      Vitals     LMP 04/05/2010      Medical History     Allergies:    Allergies   Allergen Reactions     Bee Venom      swelling    No Known Drug Allergy        Medications:    Current Outpatient Medications   Medication Sig Dispense Refill    progesterone (PROMETRIUM) 100 MG capsule Take 100 mg by mouth daily         Problem List:  Patient Active Problem List    Diagnosis Date Noted    Cataract, nuclear sclerotic, both eyes 07/08/2020     Priority: Medium    Myopia of both eyes with astigmatism and presbyopia 07/08/2020     Priority: Medium    Drusen of macula of both eyes 07/08/2020     Priority: Medium    Chronic right-sided low back pain without sciatica 10/02/2019     Priority: Medium    Lumbar facet arthropathy 09/04/2019     Priority: Medium    Lumbar degenerative disc disease 09/04/2019     Priority: Medium    Left knee pain 07/22/2016     Priority: Medium    Right knee pain 07/22/2016     Priority: Medium    Hip pain 10/05/2011     Priority: Medium    Common wart 08/05/2011     Priority: Medium     right thumb web      CARDIOVASCULAR SCREENING; LDL GOAL LESS THAN 160 05/09/2010     Priority: Medium    Lumbar radiculopathy 08/13/2008     Priority: Medium        Past Medical/Surgical History:  Past Medical History:   Diagnosis Date    NO ACTIVE PROBLEMS      Patient Active Problem List    Diagnosis Date Noted    Cataract, nuclear sclerotic, both eyes 07/08/2020     Priority: Medium    Myopia of both eyes with astigmatism and presbyopia 07/08/2020     Priority: Medium    Drusen of macula of both eyes 07/08/2020     Priority: Medium    Chronic right-sided low back pain without sciatica 10/02/2019     Priority: Medium    Lumbar facet arthropathy 09/04/2019     Priority: Medium    Lumbar degenerative disc disease 09/04/2019     Priority: Medium    Left knee pain 07/22/2016     Priority: Medium    Right knee pain 07/22/2016     Priority: Medium    Hip pain 10/05/2011     Priority: Medium    Common wart 08/05/2011     Priority: Medium     right thumb web      CARDIOVASCULAR SCREENING; LDL GOAL LESS THAN 160  05/09/2010     Priority: Medium    Lumbar radiculopathy 08/13/2008     Priority: Medium     Patient Active Problem List   Diagnosis    Lumbar radiculopathy    CARDIOVASCULAR SCREENING; LDL GOAL LESS THAN 160    Common wart    Hip pain    Left knee pain    Right knee pain    Lumbar facet arthropathy    Lumbar degenerative disc disease    Chronic right-sided low back pain without sciatica    Cataract, nuclear sclerotic, both eyes    Myopia of both eyes with astigmatism and presbyopia    Drusen of macula of both eyes        Most Recent Labs:  Lab on 06/06/2023   Component Date Value Ref Range Status    WBC Count 06/06/2023 6.0  4.0 - 11.0 10e3/uL Final    RBC Count 06/06/2023 4.42  3.80 - 5.20 10e6/uL Final    Hemoglobin 06/06/2023 13.4  11.7 - 15.7 g/dL Final    Hematocrit 06/06/2023 39.7  35.0 - 47.0 % Final    MCV 06/06/2023 90  78 - 100 fL Final    MCH 06/06/2023 30.3  26.5 - 33.0 pg Final    MCHC 06/06/2023 33.8  31.5 - 36.5 g/dL Final    RDW 06/06/2023 12.7  10.0 - 15.0 % Final    Platelet Count 06/06/2023 244  150 - 450 10e3/uL Final    Sodium 06/06/2023 140  136 - 145 mmol/L Final    Potassium 06/06/2023 4.3  3.4 - 5.3 mmol/L Final    Chloride 06/06/2023 107  98 - 107 mmol/L Final    Carbon Dioxide (CO2) 06/06/2023 26  22 - 29 mmol/L Final    Anion Gap 06/06/2023 7  7 - 15 mmol/L Final    Urea Nitrogen 06/06/2023 14.2  8.0 - 23.0 mg/dL Final    Creatinine 06/06/2023 0.85  0.51 - 0.95 mg/dL Final    Calcium 06/06/2023 9.3  8.8 - 10.2 mg/dL Final    Glucose 06/06/2023 95  70 - 99 mg/dL Final    Alkaline Phosphatase 06/06/2023 74  35 - 104 U/L Final    AST 06/06/2023 20  10 - 35 U/L Final    ALT 06/06/2023 17  10 - 35 U/L Final    Protein Total 06/06/2023 7.0  6.4 - 8.3 g/dL Final    Albumin 06/06/2023 4.2  3.5 - 5.2 g/dL Final    Bilirubin Total 06/06/2023 0.4  <=1.2 mg/dL Final    GFR Estimate 06/06/2023 77  >60 mL/min/1.73m2 Final    eGFR calculated using 2021 CKD-EPI equation.    Cholesterol 06/06/2023 242 (H)   <200 mg/dL Final    Triglycerides 2023 94  <150 mg/dL Final    Direct Measure HDL 2023 73  >=50 mg/dL Final    LDL Cholesterol Calculated 2023 150 (H)  <=100 mg/dL Final    Non HDL Cholesterol 2023 169 (H)  <130 mg/dL Final    Magnesium 2023 2.2  1.7 - 2.3 mg/dL Final    TSH 2023 3.21  0.30 - 4.20 uIU/mL Final     Mental Health History     Prior Mental Health Diagnosis:   Anxiety Disorder Unspecified per previous sleep medicine consultation    Mental Health Treatment:   She has had previous episodes of psychotherapy to address anxiety, family and child rearing issues.  She also reports counseling about marital issues. No prior psychiatric consultation or medication trials.  She also exercises regularly for stress management.    Chemical Abuse/Treatment:    None reported        Family History     Family History   Problem Relation Age of Onset    Depression Mother         manic depressive    Hypertension Father     Cardiovascular Father         stroke  at age 71    Arthritis Father         gout    Diabetes Maternal Grandmother     Breast Cancer Paternal Grandmother     Family History Negative Sister     Family History Negative Sister     Family History Negative Sister     Family History Negative Brother     Family History Negative Brother     Melanoma No family hx of        Social History     Social History     Socioeconomic History    Marital status:      Spouse name: Alon    Number of children: 2   Occupational History    Occupation: baker     Employer: [employer]     Comment: [employer]   Tobacco Use    Smoking status: Never    Smokeless tobacco: Never   Substance and Sexual Activity    Alcohol use: Yes     Comment: 1 beer or glass of wine a day    Drug use: No    Sexual activity: Yes     Partners: Male   Other Topics Concern     Service No    Blood Transfusions No    Caffeine Concern Yes     Comment: 2 cups of coffee a day    Occupational Exposure  No    Hobby Hazards No    Sleep Concern No    Stress Concern No    Weight Concern No    Special Diet No    Back Care No    Exercise Yes     Comment: runs 5 times a week 2 to 3 miles each time    Bike Helmet Yes    Seat Belt Yes    Self-Exams No    Parent/sibling w/ CABG, MI or angioplasty before 65F 55M? No   Social History Narrative    Balanced Diet - Yes    Osteoporosis Prevention Measures - Dairy servings per day: 2 to 3 servings daily    Regular Exercise -  Yes Describe swims twice a week for 1 mile    Dental Exam - YES - Date: 3 months ago    Eye Exam - YES - Date: 1 year ago    Self Breast Exam - No    Abuse: Current or Past (Physical, Sexual or Emotional)- No    Do you feel safe in your environment - Yes    Guns stored in the home - No    Sunscreen used - No    Seatbelts used - Yes    Lipids -  NO    Glucose -  YES - Date: 3-05    Colon Cancer Screening - No    Hemoccults - NO    Pap Test -  YES - Date: 1 year ago, does at ob/gyn, jhx of abnormal pap, cryosurgery    Do you have any concerns about STD's -  No    Mammography - YES - Date: 1-07    DEXA - NO    Immunizations reviewed and up to date - thinks it's been about 10 years since her last td.    2-01-08  YAMILET [facility]            Mental Status Examination     Ruthann presented as oriented X3 with speech and language intact.  The patient was cooperative throughout the evaluation with no signs of hallucinations, delusions, loosening of associations or other thought disturbance.  Mood was normal Affect was congruent with mood. Insight and judgement were intact.  Memory appeared intact for recent and remote elements.  There was no report of suicidal ideation, intention or plan. Attention and concentration were within normal.        Silvestre Cardoza, Mamta, LP, DBSM  Diplomate, Behavioral Sleep Medicine  [facility]      Copy:   Glory Bass MD  [address]  [place] [address]    Note:  This dictation was created using voice recognition software. This document may contain an error not identified before finalizing the document. If the error changes the accuracy of the document, I would appreciate it being brought to my attention.

## 2024-02-29 NOTE — NURSING NOTE
Is the patient currently in the state of [place]? YES    Visit mode:VIDEO    If the visit is dropped, the patient can be reconnected by: VIDEO VISIT: Text to cell phone:   Telephone Information:   Mobile [phone]       Will anyone else be joining the visit? NO  (If patient encounters technical issues they should call [phone])    How would you like to obtain your AVS? MyChart    Are changes needed to the allergy or medication list? Pt stated no changes to allergies and Pt stated no med changes    Reason for visit: Consult  Has patient had flu shot for current/most recent flu season? If so, when? Yes: 10/09/2023    Rosalie ARMSTRONG

## 2024-03-01 NOTE — PATIENT INSTRUCTIONS
Here is a link to the [facility] Mindfulness based stress reduction course online.  The [facility] also offers a course.  [email or web address]    .      CBT-I Module - Relaxation Training    Relaxation and Sleep    Winding Down Time    A wind-down time before you go to bed can help you relax your mind and body. Ways to help transition from a busy day to bedtime include things like:    Listening to calm music  Reading  Watching a pleasant or relaxing TV show  Taking a bath    We recommend you set a reminder to begin your wind down time one hour before bedtime.  Many mobile sleep apps such as Insomnia  have wind-down time reminders.    Relaxation and Sleep    Research shows relaxing before bed can reduce the time it takes to fall asleep and improve sleep quality.  Relaxation training works by reducing physical, emotional and mental arousal that can interfere with your sleep.     Relaxation skills are easy to learn on your own using widely available free mobile apps or online resources.  We recommend doing some form of relaxation exercise daily for at least 10 minutes.  This can include short breathing exercises used throughout the day to help manage stress.    If you are using the Insomnia  mobile machelle you will find  relaxation exercises by pressing the Tools icon  going to the Relax Your Body or Quiet Your Mind section.     Insight Timer, Calm, and Headspace are examples of well-reviewed mobile apps that offer free, for-purchase, and subscription guided relaxation exercises and meditations.    Do not use guided relaxation tapes while driving or operating equipment.      Managing Worry before Bed and During the Night    All human beings worry.  Uncontrolled worry can affect your sleep and health by activating your arousal system.  Worry makes your brain, body  and heart behave like there is a danger or threat.  This stress response can make it more difficult to fall asleep and stay asleep. The most common types of worry include:    Concerned about unfinished tasks  Concern over family, finances or work  Worry about things beyond your control    Scheduling Constructive Worry Time    The part of our brain that plans, sorts, and helps manage our emotions is less effective in doing its job as nighttime comes.  Without the usual distractions of the day, we tend to worry more and have trouble putting aside our worries.    A simple technique called Constructive Worry Time can help to reduce and manage worry as you approach bedtime or in the middle of the night. It is most effective when practiced daily.

## 2024-08-17 ENCOUNTER — HEALTH MAINTENANCE LETTER (OUTPATIENT)
Age: 64
End: 2024-08-17

## 2024-09-13 ENCOUNTER — OFFICE VISIT (OUTPATIENT)
Dept: FAMILY MEDICINE | Facility: CLINIC | Age: 64
End: 2024-09-13
Payer: COMMERCIAL

## 2024-09-13 VITALS
RESPIRATION RATE: 16 BRPM | TEMPERATURE: 97.7 F | DIASTOLIC BLOOD PRESSURE: 80 MMHG | SYSTOLIC BLOOD PRESSURE: 117 MMHG | HEIGHT: 65 IN | BODY MASS INDEX: 25.83 KG/M2 | OXYGEN SATURATION: 96 % | WEIGHT: 155 LBS

## 2024-09-13 DIAGNOSIS — Z00.00 ROUTINE GENERAL MEDICAL EXAMINATION AT A HEALTH CARE FACILITY: Primary | ICD-10-CM

## 2024-09-13 DIAGNOSIS — E78.2 MIXED HYPERLIPIDEMIA: ICD-10-CM

## 2024-09-13 LAB
ALBUMIN SERPL BCG-MCNC: 4.2 G/DL (ref 3.5–5.2)
ALP SERPL-CCNC: 65 U/L (ref 40–150)
ALT SERPL W P-5'-P-CCNC: 16 U/L (ref 0–50)
ANION GAP SERPL CALCULATED.3IONS-SCNC: 10 MMOL/L (ref 7–15)
AST SERPL W P-5'-P-CCNC: 23 U/L (ref 0–45)
BILIRUB SERPL-MCNC: 0.4 MG/DL
BUN SERPL-MCNC: 12.8 MG/DL (ref 8–23)
CALCIUM SERPL-MCNC: 9.3 MG/DL (ref 8.8–10.4)
CHLORIDE SERPL-SCNC: 106 MMOL/L (ref 98–107)
CHOLEST SERPL-MCNC: 276 MG/DL
CREAT SERPL-MCNC: 0.91 MG/DL (ref 0.51–0.95)
EGFRCR SERPLBLD CKD-EPI 2021: 71 ML/MIN/1.73M2
ERYTHROCYTE [DISTWIDTH] IN BLOOD BY AUTOMATED COUNT: 12.8 % (ref 10–15)
FASTING STATUS PATIENT QL REPORTED: YES
FASTING STATUS PATIENT QL REPORTED: YES
GLUCOSE SERPL-MCNC: 86 MG/DL (ref 70–99)
HCO3 SERPL-SCNC: 24 MMOL/L (ref 22–29)
HCT VFR BLD AUTO: 42.3 % (ref 35–47)
HDLC SERPL-MCNC: 81 MG/DL
HGB BLD-MCNC: 13.5 G/DL (ref 11.7–15.7)
LDLC SERPL CALC-MCNC: 173 MG/DL
MCH RBC QN AUTO: 29.7 PG (ref 26.5–33)
MCHC RBC AUTO-ENTMCNC: 31.9 G/DL (ref 31.5–36.5)
MCV RBC AUTO: 93 FL (ref 78–100)
NONHDLC SERPL-MCNC: 195 MG/DL
PLATELET # BLD AUTO: 275 10E3/UL (ref 150–450)
POTASSIUM SERPL-SCNC: 4.6 MMOL/L (ref 3.4–5.3)
PROT SERPL-MCNC: 7 G/DL (ref 6.4–8.3)
RBC # BLD AUTO: 4.54 10E6/UL (ref 3.8–5.2)
SODIUM SERPL-SCNC: 140 MMOL/L (ref 135–145)
TRIGL SERPL-MCNC: 112 MG/DL
WBC # BLD AUTO: 6.2 10E3/UL (ref 4–11)

## 2024-09-13 PROCEDURE — 99396 PREV VISIT EST AGE 40-64: CPT | Mod: 25 | Performed by: FAMILY MEDICINE

## 2024-09-13 PROCEDURE — 90673 RIV3 VACCINE NO PRESERV IM: CPT | Performed by: FAMILY MEDICINE

## 2024-09-13 PROCEDURE — 90471 IMMUNIZATION ADMIN: CPT | Performed by: FAMILY MEDICINE

## 2024-09-13 PROCEDURE — 36415 COLL VENOUS BLD VENIPUNCTURE: CPT | Performed by: FAMILY MEDICINE

## 2024-09-13 PROCEDURE — 85027 COMPLETE CBC AUTOMATED: CPT | Performed by: FAMILY MEDICINE

## 2024-09-13 PROCEDURE — 80061 LIPID PANEL: CPT | Performed by: FAMILY MEDICINE

## 2024-09-13 PROCEDURE — 80053 COMPREHEN METABOLIC PANEL: CPT | Performed by: FAMILY MEDICINE

## 2024-09-13 NOTE — PATIENT INSTRUCTIONS
Patient Education   Preventive Care Advice   This is general advice given by our system to help you stay healthy. However, your care team may have specific advice just for you. Please talk to your care team about your preventive care needs.  Nutrition  Eat 5 or more servings of fruits and vegetables each day.  Try wheat bread, brown rice and whole grain pasta (instead of white bread, rice, and pasta).  Get enough calcium and vitamin D. Check the label on foods and aim for 100% of the RDA (recommended daily allowance).  Lifestyle  Exercise at least 150 minutes each week  (30 minutes a day, 5 days a week).  Do muscle strengthening activities 2 days a week. These help control your weight and prevent disease.  No smoking.  Wear sunscreen to prevent skin cancer.  Have a dental exam and cleaning every 6 months.  Yearly exams  See your health care team every year to talk about:  Any changes in your health.  Any medicines your care team has prescribed.  Preventive care, family planning, and ways to prevent chronic diseases.  Shots (vaccines)   HPV shots (up to age 26), if you've never had them before.  Hepatitis B shots (up to age 59), if you've never had them before.  COVID-19 shot: Get this shot when it's due.  Flu shot: Get a flu shot every year.  Tetanus shot: Get a tetanus shot every 10 years.  Pneumococcal, hepatitis A, and RSV shots: Ask your care team if you need these based on your risk.  Shingles shot (for age 50 and up)  General health tests  Diabetes screening:  Starting at age 35, Get screened for diabetes at least every 3 years.  If you are younger than age 35, ask your care team if you should be screened for diabetes.  Cholesterol test: At age 39, start having a cholesterol test every 5 years, or more often if advised.  Bone density scan (DEXA): At age 50, ask your care team if you should have this scan for osteoporosis (brittle bones).  Hepatitis C: Get tested at least once in your life.  STIs (sexually  transmitted infections)  Before age 24: Ask your care team if you should be screened for STIs.  After age 24: Get screened for STIs if you're at risk. You are at risk for STIs (including HIV) if:  You are sexually active with more than one person.  You don't use condoms every time.  You or a partner was diagnosed with a sexually transmitted infection.  If you are at risk for HIV, ask about PrEP medicine to prevent HIV.  Get tested for HIV at least once in your life, whether you are at risk for HIV or not.  Cancer screening tests  Cervical cancer screening: If you have a cervix, begin getting regular cervical cancer screening tests starting at age 21.  Breast cancer scan (mammogram): If you've ever had breasts, begin having regular mammograms starting at age 40. This is a scan to check for breast cancer.  Colon cancer screening: It is important to start screening for colon cancer at age 45.  Have a colonoscopy test every 10 years (or more often if you're at risk) Or, ask your provider about stool tests like a FIT test every year or Cologuard test every 3 years.  To learn more about your testing options, visit:   .  For help making a decision, visit:   https://bit.ly/sc55000.  Prostate cancer screening test: If you have a prostate, ask your care team if a prostate cancer screening test (PSA) at age 55 is right for you.  Lung cancer screening: If you are a current or former smoker ages 50 to 80, ask your care team if ongoing lung cancer screenings are right for you.  For informational purposes only. Not to replace the advice of your health care provider. Copyright   2023 [employer]. All rights reserved. Clinically reviewed by the [facility]. Commun.it 243184 - REV 01/24.

## 2024-09-13 NOTE — PROGRESS NOTES
"Preventive Care Visit  [facility]  Glory Bass MD, Family Medicine  Sep 13, 2024      Assessment & Plan     Routine general medical examination at a health care facility  - up to date with mammogram, colonoscopy, pap smear and dermatology visit   - CBC with platelets; Future  - Comprehensive metabolic panel (BMP + Alb, Alk Phos, ALT, AST, Total. Bili, TP); Future  - CBC with platelets  - Comprehensive metabolic panel (BMP + Alb, Alk Phos, ALT, AST, Total. Bili, TP)    Mixed hyperlipidemia  - Lipid panel reflex to direct LDL Fasting; Future  - Lipid panel reflex to direct LDL Fasting            BMI  Estimated body mass index is 25.82 kg/m  as calculated from the following:    Height as of this encounter: 1.65 m (5' 4.96\").    Weight as of this encounter: 70.3 kg (155 lb).       Counseling  Appropriate preventive services were addressed with this patient via screening, questionnaire, or discussion as appropriate for fall prevention, nutrition, physical activity, Tobacco-use cessation, social engagement, weight loss and cognition.  Checklist reviewing preventive services available has been given to the patient.  Reviewed patient's diet, addressing concerns and/or questions.   She is at risk for psychosocial distress and has been provided with information to reduce risk.           Antionette Beavers is a 64 year old, presenting for the following:  Physical        9/13/2024     8:48 AM   Additional Questions   Roomed by TJ        Health Care Directive  Patient does not have a Health Care Directive or Living Will: Discussed advance care planning with patient; information given to patient to review.    HPI          9/13/2024   General Health   How would you rate your overall physical health? Excellent   Feel stress (tense, anxious, or unable to sleep) Only a little      (!) STRESS CONCERN      9/13/2024   Nutrition   Three or more servings of calcium each day? (!) NO   Diet: Regular " (no restrictions)   How many servings of fruit and vegetables per day? 4 or more   How many sweetened beverages each day? 0-1            9/13/2024   Exercise   Days per week of moderate/strenous exercise 5 days            9/13/2024   Social Factors   Frequency of gathering with friends or relatives More than three times a week   Worry food won't last until get money to buy more No   Food not last or not have enough money for food? No   Do you have housing? (Housing is defined as stable permanent housing and does not include staying ouside in a car, in a tent, in an abandoned building, in an overnight shelter, or couch-surfing.) Yes   Are you worried about losing your housing? No   Lack of transportation? No   Unable to get utilities (heat,electricity)? No            9/13/2024   Fall Risk   Fallen 2 or more times in the past year? No   Trouble with walking or balance? No             9/13/2024   Dental   Dentist two times every year? Yes            9/13/2024   TB Screening   Were you born outside of the [place]? No          9/13/2024   Substance Use   Alcohol more than 3/day or more than 7/wk No   Do you use any other substances recreationally? No        Social History     Tobacco Use    Smoking status: Never    Smokeless tobacco: Never   Substance Use Topics    Alcohol use: Yes     Comment: 1 beer or glass of wine a day    Drug use: No         6/5/2023   LAST FHS-7 RESULTS   1st degree relative breast or ovarian cancer No   Any relative bilateral breast cancer No   Any male have breast cancer No   Any ONE woman have BOTH breast AND ovarian cancer No    Any woman with breast cancer before 50yrs No   2 or more relatives with breast AND/OR ovarian cancer No   2 or more relatives with breast AND/OR bowel cancer No              9/13/2024   One time HIV Screening   Previous HIV test? Yes          9/13/2024   STI Screening   New sexual partner(s) since last STI/HIV test? No        History of abnormal Pap smear: No - age 30- 64  "PAP with HPV every 5 years recommended        6/23/2018    12:00 AM   PAP / HPV   PAP-ABSTRACT See Scanned Document           This result is from an external source.     ASCVD Risk   The 10-year ASCVD risk score (Maldonado ESTEVEZ, et al., 2019) is: 5.4%    Values used to calculate the score:      Age: 64 years      Sex: Female      Is Non- : No      Diabetic: No      Tobacco smoker: No      Systolic Blood Pressure: 143 mmHg      Is BP treated: No      HDL Cholesterol: 73 mg/dL      Total Cholesterol: 242 mg/dL           Reviewed and updated as needed this visit by Provider                             Objective    Exam  BP (!) 143/85 (BP Location: Right arm, Patient Position: Sitting, Cuff Size: Adult Regular)   Temp 97.7  F (36.5  C) (Temporal)   Resp 16   Ht 1.65 m (5' 4.96\")   Wt 70.3 kg (155 lb)   LMP 04/05/2010   SpO2 96%   BMI 25.82 kg/m     Estimated body mass index is 25.82 kg/m  as calculated from the following:    Height as of this encounter: 1.65 m (5' 4.96\").    Weight as of this encounter: 70.3 kg (155 lb).  /80   Temp 97.7  F (36.5  C) (Temporal)   Resp 16   Ht 1.65 m (5' 4.96\")   Wt 70.3 kg (155 lb)   LMP 04/05/2010   SpO2 96%   BMI 25.82 kg/m     Physical Exam  GENERAL: alert and no distress  EYES: Eyes grossly normal to inspection  HENT: ear canals and TM's normal  NECK: no adenopathy, no asymmetry, masses, or scars  RESP: lungs clear to auscultation - no rales, rhonchi or wheezes  BREAST: normal without masses, tenderness or nipple discharge and no palpable axillary masses or adenopathy  CV: regular rate and rhythm, normal S1 S2  ABDOMEN: soft, nontender, no hepatosplenomegaly, no masses and bowel sounds normal  MS: no gross musculoskeletal defects noted, no edema  SKIN: no suspicious lesions or rashes  NEURO: Normal strength and tone, mentation intact and speech normal  PSYCH: mentation appears normal, affect normal/bright        Signed Electronically " by: Glory Bass MD

## 2024-10-07 ENCOUNTER — MYC MEDICAL ADVICE (OUTPATIENT)
Dept: FAMILY MEDICINE | Facility: CLINIC | Age: 64
End: 2024-10-07
Payer: COMMERCIAL

## 2024-10-07 DIAGNOSIS — E78.2 MIXED HYPERLIPIDEMIA: Primary | ICD-10-CM

## 2024-10-09 NOTE — TELEPHONE ENCOUNTER
Labs should be checked in 3-6 months after lifestyle changes. I recommend scheduling for mid-Dec 2024.   DM

## 2024-11-18 ENCOUNTER — MYC MEDICAL ADVICE (OUTPATIENT)
Dept: FAMILY MEDICINE | Facility: CLINIC | Age: 64
End: 2024-11-18
Payer: COMMERCIAL

## 2024-11-18 DIAGNOSIS — Z77.011 LEAD EXPOSURE: Primary | ICD-10-CM

## 2024-12-12 ENCOUNTER — LAB (OUTPATIENT)
Dept: LAB | Facility: CLINIC | Age: 64
End: 2024-12-12
Payer: COMMERCIAL

## 2024-12-12 DIAGNOSIS — Z77.011 LEAD EXPOSURE: ICD-10-CM

## 2024-12-12 DIAGNOSIS — E78.2 MIXED HYPERLIPIDEMIA: ICD-10-CM

## 2024-12-12 LAB
CHOLEST SERPL-MCNC: 251 MG/DL
FASTING STATUS PATIENT QL REPORTED: YES
HDLC SERPL-MCNC: 80 MG/DL
LDLC SERPL CALC-MCNC: 152 MG/DL
NONHDLC SERPL-MCNC: 171 MG/DL
TRIGL SERPL-MCNC: 94 MG/DL

## 2024-12-12 PROCEDURE — 36415 COLL VENOUS BLD VENIPUNCTURE: CPT | Performed by: PATHOLOGY

## 2024-12-12 PROCEDURE — 83655 ASSAY OF LEAD: CPT | Mod: 90 | Performed by: PATHOLOGY

## 2024-12-12 PROCEDURE — 80061 LIPID PANEL: CPT | Performed by: PATHOLOGY

## 2024-12-12 PROCEDURE — 99000 SPECIMEN HANDLING OFFICE-LAB: CPT | Performed by: PATHOLOGY

## 2024-12-14 LAB — LEAD BLDV-MCNC: 3.8 UG/DL

## 2025-04-18 NOTE — PROGRESS NOTES
HPI:  Patient presents for an annual eye exam.     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       Pertinent Medical History:  N/A    Ocular History:  Retinal hole, left eye.   Myopia, both eyes.   Cataract, both eyes.   Macular drusen, both eyes.   PVD, both eyes.   Vitreous tuft, right eye.     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 Early.   Macular OCT 04/28/2025: Right eye: rpe mottling vs drusen, no SRF; Left eye: rpe mottling vs drusen, no SRF  Macular degeneration causes central vision blindness and there is no cure. The goal of initiating treatment is to slow down progression and to preserve vision.   Smoking status:   Family history of macular degeneration:  Hold on AREDS vitamins  Recommend UV protection.   Recommend fish and green leafy vegetables 2-3 days per week.   Return immediately if there is waviness in vision or decrease in vision.   Return immediately if there are changes seen on amsler grid.  Recommend annual dilation and macular OCT.      #   Cataract, both eyes.   Not visually significant.   May need more lighting for near work and reading.   Recommend UV protection.   Recommend annual dilated eye exam.      #   Peripheral Retinal Hole, left eye. Retinas attached.   #   Vitreous Traction, left eye.   Retinal oct 04/28/2025: Left eye: traction temporal  Educated on signs and symptoms of a retinal detachment (ie. Hundreds of floaters, flashes of light, and shadow/curtain over the vision) to be seen immediately.   Recommend annual dilated eye exam.      #   Myopia, both eyes.   Glasses prescription given.            Patient consented to a dilated eye exam:    Yes. Side effects discussed.  Mood/affect: pleasant.     Medical History:  Past Medical History:   Diagnosis Date    NO ACTIVE PROBLEMS        Medications:  Current Outpatient Medications   Medication Sig Dispense Refill    progesterone (PROMETRIUM)  100 MG capsule Take 100 mg by mouth daily     Complete documentation of historical and exam elements from today's encounter can be found in the full encounter summary report (not reduplicated in this progress note). I personally obtained the chief complaint(s) and history of present illness.  I confirmed and edited as necessary the review of systems, past medical/surgical history, family history, social history, and examination findings as documented by others; and I examined the patient myself. I personally reviewed the relevant tests, images, and reports as documented above. I formulated and edited as necessary the assessment and plan and discussed the findings and management plan with the patient and family. - Tanesha Cloud OD

## 2025-04-28 ENCOUNTER — OFFICE VISIT (OUTPATIENT)
Dept: OPHTHALMOLOGY | Facility: CLINIC | Age: 65
End: 2025-04-28
Attending: OPTOMETRIST
Payer: COMMERCIAL

## 2025-04-28 DIAGNOSIS — Q14.0 VITREOUS ANOMALIES: ICD-10-CM

## 2025-04-28 DIAGNOSIS — H43.813 VITREOUS DETACHMENT OF BOTH EYES: ICD-10-CM

## 2025-04-28 DIAGNOSIS — H35.3131 EARLY DRY STAGE NONEXUDATIVE AGE-RELATED MACULAR DEGENERATION OF BOTH EYES: ICD-10-CM

## 2025-04-28 DIAGNOSIS — H52.13 MYOPIA OF BOTH EYES: ICD-10-CM

## 2025-04-28 DIAGNOSIS — H33.322 ROUND HOLE OF LEFT RETINA WITHOUT DETACHMENT: ICD-10-CM

## 2025-04-28 DIAGNOSIS — H25.13 NUCLEAR SENILE CATARACT OF BOTH EYES: Primary | ICD-10-CM

## 2025-04-28 PROCEDURE — 99213 OFFICE O/P EST LOW 20 MIN: CPT | Performed by: OPTOMETRIST

## 2025-04-28 PROCEDURE — 92134 CPTRZ OPH DX IMG PST SGM RTA: CPT | Performed by: OPTOMETRIST

## 2025-04-28 ASSESSMENT — TONOMETRY
OS_IOP_MMHG: 15
IOP_METHOD: TONOPEN
OD_IOP_MMHG: 18

## 2025-04-28 ASSESSMENT — CONF VISUAL FIELD
OS_SUPERIOR_NASAL_RESTRICTION: 0
METHOD: COUNTING FINGERS
OD_SUPERIOR_NASAL_RESTRICTION: 0
OS_SUPERIOR_TEMPORAL_RESTRICTION: 0
OD_SUPERIOR_TEMPORAL_RESTRICTION: 0
OS_INFERIOR_NASAL_RESTRICTION: 0
OS_INFERIOR_TEMPORAL_RESTRICTION: 0
OD_INFERIOR_NASAL_RESTRICTION: 0
OS_NORMAL: 1
OD_NORMAL: 1
OD_INFERIOR_TEMPORAL_RESTRICTION: 0

## 2025-04-28 ASSESSMENT — REFRACTION_MANIFEST
OS_ADD: +2.25
OD_ADD: +2.25
OD_CYLINDER: +1.25
OS_SPHERE: -4.25
OS_CYLINDER: +0.75
OD_SPHERE: -5.00
OD_AXIS: 035
OS_AXIS: 117

## 2025-04-28 ASSESSMENT — SLIT LAMP EXAM - LIDS
COMMENTS: NORMAL
COMMENTS: NORMAL

## 2025-04-28 ASSESSMENT — VISUAL ACUITY
OS_CC: J2
OS_CC: 20/20
OD_CC+: +2
OD_CC: 20/25
OD_CC: J2
OS_CC+: -1
METHOD: SNELLEN - LINEAR
METHOD_MR: RED/GREEN BALANCE

## 2025-04-28 ASSESSMENT — REFRACTION_WEARINGRX
OD_ADD: +2.75
OS_SPHERE: -3.75
OS_AXIS: 047
OS_ADD: +2.75
OD_SPHERE: -4.75
OD_AXIS: 057
OD_CYLINDER: +1.00
OS_CYLINDER: +0.75

## 2025-04-28 ASSESSMENT — EXTERNAL EXAM - LEFT EYE: OS_EXAM: NORMAL

## 2025-04-28 ASSESSMENT — EXTERNAL EXAM - RIGHT EYE: OD_EXAM: NORMAL

## 2025-04-28 NOTE — NURSING NOTE
Chief Complaints and History of Present Illnesses   Patient presents with    Annual Eye Exam     Chief Complaint(s) and History of Present Illness(es)       Annual Eye Exam              Laterality: both eyes    Onset: gradual    Associated symptoms: Negative for dryness, eye pain, redness, tearing, flashes, floaters, itching and burning    Pain scale: 0/10              Comments    Ruthann is here new to clinic, and referred by Dr Bass for annual eye exam.

## 2025-04-29 PROBLEM — N95.8 GENITOURINARY SYNDROME OF MENOPAUSE: Status: ACTIVE | Noted: 2025-04-29

## 2025-04-30 ENCOUNTER — LAB (OUTPATIENT)
Dept: LAB | Facility: CLINIC | Age: 65
End: 2025-04-30
Payer: COMMERCIAL

## 2025-04-30 DIAGNOSIS — Z13.1 SCREENING FOR DIABETES MELLITUS: ICD-10-CM

## 2025-04-30 DIAGNOSIS — Z11.4 SCREENING FOR HIV (HUMAN IMMUNODEFICIENCY VIRUS): ICD-10-CM

## 2025-04-30 DIAGNOSIS — E78.2 MIXED HYPERLIPIDEMIA: ICD-10-CM

## 2025-04-30 LAB
CHOLEST SERPL-MCNC: 254 MG/DL
EST. AVERAGE GLUCOSE BLD GHB EST-MCNC: 108 MG/DL
FASTING STATUS PATIENT QL REPORTED: YES
HBA1C MFR BLD: 5.4 % (ref 0–5.6)
HDLC SERPL-MCNC: 68 MG/DL
HIV 1+2 AB+HIV1 P24 AG SERPL QL IA: NONREACTIVE
LDLC SERPL CALC-MCNC: 166 MG/DL
NONHDLC SERPL-MCNC: 186 MG/DL
TRIGL SERPL-MCNC: 99 MG/DL

## 2025-05-14 ENCOUNTER — TELEPHONE (OUTPATIENT)
Dept: GASTROENTEROLOGY | Facility: CLINIC | Age: 65
End: 2025-05-14
Payer: COMMERCIAL

## 2025-05-14 NOTE — TELEPHONE ENCOUNTER
"Endoscopy Scheduling Screen    Caller: patient    Have you had any respiratory illness or flu-like symptoms in the last 10 days?  No    What is your communication preference for Instructions and/or Bowel Prep?   MyChart    What insurance is in the chart?  Other:  medica     Ordering/Referring Provider: RAFFI HARPER    (If ordering provider performs procedure, schedule with ordering provider unless otherwise instructed. )    BMI: Estimated body mass index is 26.61 kg/m  as calculated from the following:    Height as of 4/29/25: 1.626 m (5' 4\").    Weight as of 9/13/24: 70.3 kg (155 lb).     Sedation Ordered  MAC/deep sedation.   BMI<= 45 45 < BMI <= 48 48 < BMI < = 50  BMI > 50   No Restrictions No [facility]  No [facility]  [facility] with exceptions Hospital Only OR Only       Do you have a history of malignant hyperthermia?  No    (Females) Are you currently pregnant?   No     Have you been diagnosed or told you have pulmonary hypertension?   No    Do you have an LVAD?  No    Have you been told you have moderate to severe sleep apnea?  No.    Have you been told you have COPD, asthma, or any other lung disease?  No    Has your doctor ordered any cardiac tests like echo, angiogram, stress test, ablation, or EKG, that you have not completed yet?  No    Do you  have a history of any heart conditions?  No     Have you ever had or are you waiting for an organ transplant?  No. Continue scheduling, no site restrictions.    Have you had a stroke or transient ischemic attack (TIA aka \"mini stroke\") in the last 2 years?   No.    Have you been diagnosed with or been told you have cirrhosis of the liver?   No.    Are you currently on dialysis?   No    Do you need assistance transferring?   No    BMI: Estimated body mass index is 26.61 kg/m  as calculated from the following:    Height as of 4/29/25: 1.626 m (5' 4\").    Weight as of 9/13/24: 70.3 kg (155 lb).     Is patients BMI > 40 and scheduling location [facility]?  No    Do you take " an injectable or oral medication for weight loss or diabetes (excluding insulin)?  No    Do you take the medication Naltrexone?  No    Do you take blood thinners?  No       Prep   Are you currently on dialysis or do you have chronic kidney disease?  No    Do you have a diagnosis of diabetes?  No    Do you have a diagnosis of cystic fibrosis (CF)?  No    On a regular basis do you go 3 -5 days between bowel movements?  No    BMI > 40?  No    Preferred Pharmacy:        [place] - [address] [facility] [address]  Phone: [phone] Fax: [phone]      Final Scheduling Details     Procedure scheduled  Colonoscopy    Surgeon:  Deirdre      Date of procedure:  8/19/2025     Pre-OP / PAC:   No - Not required for this site.    Location  [facility] - [facility] - Patient preference.    Sedation   MAC/Deep Sedation - Per order.      Patient Reminders:   You will receive a call from a Nurse to review instructions and health history.  This assessment must be completed prior to your procedure.  Failure to complete the Nurse assessment may result in the procedure being cancelled.      On the day of your procedure, please designate an adult(s) who can drive you home stay with you for the next 24 hours. The medicines used in the exam will make you sleepy. You will not be able to drive.      You cannot take public transportation, ride share services, or non-medical taxi service without a responsible caregiver.  Medical transport services are allowed with the requirement that a responsible caregiver will receive you at your destination.  We require that drivers and caregivers are confirmed prior to your procedure.

## 2025-05-16 ENCOUNTER — RESULTS FOLLOW-UP (OUTPATIENT)
Dept: FAMILY MEDICINE | Facility: CLINIC | Age: 65
End: 2025-05-16

## 2025-05-29 DIAGNOSIS — H33.322 ROUND HOLE OF LEFT RETINA WITHOUT DETACHMENT: Primary | ICD-10-CM

## 2025-06-03 ENCOUNTER — OFFICE VISIT (OUTPATIENT)
Dept: FAMILY MEDICINE | Facility: CLINIC | Age: 65
End: 2025-06-03
Payer: COMMERCIAL

## 2025-06-03 VITALS
WEIGHT: 163.3 LBS | BODY MASS INDEX: 27.88 KG/M2 | TEMPERATURE: 98.4 F | DIASTOLIC BLOOD PRESSURE: 84 MMHG | RESPIRATION RATE: 14 BRPM | SYSTOLIC BLOOD PRESSURE: 138 MMHG | HEIGHT: 64 IN | HEART RATE: 66 BPM | OXYGEN SATURATION: 96 %

## 2025-06-03 DIAGNOSIS — B34.9 ACUTE VIRAL SYNDROME: ICD-10-CM

## 2025-06-03 DIAGNOSIS — Z02.9 ENCOUNTER FOR ADMINISTRATIVE EXAMINATIONS: Primary | ICD-10-CM

## 2025-06-03 PROCEDURE — 99213 OFFICE O/P EST LOW 20 MIN: CPT | Performed by: FAMILY MEDICINE

## 2025-06-03 PROCEDURE — 3075F SYST BP GE 130 - 139MM HG: CPT | Performed by: FAMILY MEDICINE

## 2025-06-03 PROCEDURE — 3079F DIAST BP 80-89 MM HG: CPT | Performed by: FAMILY MEDICINE

## 2025-06-03 NOTE — PROGRESS NOTES
"  Assessment & Plan     Encounter for administrative examinations  Acute viral syndrome  Symptoms resolved. Patient has been back to work since May 27th, needs form filled out. Form filled, copy made and handed form back to patient. No further questions/concerns per patient.       Follow-up  Return if symptoms worsen or fail to improve.    Antionette Beavers is a 65 year old, presenting for the following health issues:  Forms (FMLA)      6/3/2025    10:55 AM   Additional Questions   Roomed by Damian         6/3/2025    Information    services provided? No     HPI    Missed work from May 20-23rd. Needs to get FMLA form filled out for missing 4 days in a row. Was seen at urgent care on May 23rd. At the time of illness she had  laryngitis, fever, body aches, chills, cough. Symptom have now resolved. No symptoms now.       Objective    /84   Pulse 66   Temp 98.4  F (36.9  C) (Temporal)   Resp 14   Ht 1.626 m (5' 4\")   Wt 74.1 kg (163 lb 4.8 oz)   LMP 04/05/2010   SpO2 96%   BMI 28.03 kg/m    Body mass index is 28.03 kg/m .  Physical Exam  Constitutional:       Appearance: Normal appearance.   HENT:      Head: Normocephalic and atraumatic.      Right Ear: External ear normal.      Left Ear: External ear normal.   Eyes:      Extraocular Movements: Extraocular movements intact.      Conjunctiva/sclera: Conjunctivae normal.   Cardiovascular:      Rate and Rhythm: Normal rate.   Pulmonary:      Effort: Pulmonary effort is normal.      Breath sounds: Normal breath sounds.   Musculoskeletal:      Cervical back: Normal range of motion.   Neurological:      General: No focal deficit present.      Mental Status: She is alert.   Psychiatric:         Mood and Affect: Mood normal.         Thought Content: Thought content normal.                Signed Electronically by: Ruthy Salazar MD    "

## 2025-06-04 ENCOUNTER — ALLIED HEALTH/NURSE VISIT (OUTPATIENT)
Dept: FAMILY MEDICINE | Facility: CLINIC | Age: 65
End: 2025-06-04
Payer: COMMERCIAL

## 2025-06-04 DIAGNOSIS — Z23 ENCOUNTER FOR IMMUNIZATION: Primary | ICD-10-CM

## 2025-06-04 NOTE — PROGRESS NOTES
Prior to immunization administration, verified patients identity using patient s name and date of birth. Please see Immunization Activity for additional information.     Is the patient's temperature normal (100.5 or less)? Yes     Patient MEETS CRITERIA. PROCEED with vaccine administration.      Patient instructed to remain in clinic for 15 minutes afterwards, and to report any adverse reactions.      Link to Ancillary Visit Immunization Standing Orders SmartSet     Screening performed by Mynor Rivera CMA on 6/4/2025 at 3:45 PM.

## 2025-06-10 ENCOUNTER — OFFICE VISIT (OUTPATIENT)
Dept: OPHTHALMOLOGY | Facility: CLINIC | Age: 65
End: 2025-06-10
Attending: OPHTHALMOLOGY
Payer: COMMERCIAL

## 2025-06-10 DIAGNOSIS — H33.322 ROUND HOLE OF LEFT RETINA WITHOUT DETACHMENT: ICD-10-CM

## 2025-06-10 PROCEDURE — 99204 OFFICE O/P NEW MOD 45 MIN: CPT | Performed by: OPHTHALMOLOGY

## 2025-06-10 PROCEDURE — 92250 FUNDUS PHOTOGRAPHY W/I&R: CPT | Performed by: OPHTHALMOLOGY

## 2025-06-10 PROCEDURE — 92134 CPTRZ OPH DX IMG PST SGM RTA: CPT | Performed by: OPHTHALMOLOGY

## 2025-06-10 PROCEDURE — 99207 FUNDUS PHOTOS OU (BOTH EYES): CPT | Mod: 26 | Performed by: OPHTHALMOLOGY

## 2025-06-10 PROCEDURE — 99213 OFFICE O/P EST LOW 20 MIN: CPT | Performed by: OPHTHALMOLOGY

## 2025-06-10 ASSESSMENT — VISUAL ACUITY
OS_CC+: -1
CORRECTION_TYPE: GLASSES
OD_CC+: +2
METHOD: SNELLEN - LINEAR
OS_CC: 20/20
OD_CC: 20/25

## 2025-06-10 ASSESSMENT — CONF VISUAL FIELD
OD_INFERIOR_NASAL_RESTRICTION: 0
OD_INFERIOR_TEMPORAL_RESTRICTION: 0
OS_NORMAL: 1
OS_INFERIOR_NASAL_RESTRICTION: 0
METHOD: COUNTING FINGERS
OD_SUPERIOR_NASAL_RESTRICTION: 0
OS_SUPERIOR_NASAL_RESTRICTION: 0
OD_SUPERIOR_TEMPORAL_RESTRICTION: 0
OS_SUPERIOR_TEMPORAL_RESTRICTION: 0
OD_NORMAL: 1
OS_INFERIOR_TEMPORAL_RESTRICTION: 0

## 2025-06-10 ASSESSMENT — EXTERNAL EXAM - LEFT EYE: OS_EXAM: NORMAL

## 2025-06-10 ASSESSMENT — REFRACTION_WEARINGRX
OS_SPHERE: -3.75
OD_SPHERE: -4.75
OD_CYLINDER: +1.00
OS_CYLINDER: +0.75
OS_ADD: +2.75
OS_AXIS: 047
OD_ADD: +2.75
OD_AXIS: 057

## 2025-06-10 ASSESSMENT — TONOMETRY
OD_IOP_MMHG: 15
OS_IOP_MMHG: 15
IOP_METHOD: TONOPEN

## 2025-06-10 ASSESSMENT — CUP TO DISC RATIO
OD_RATIO: 0.25
OS_RATIO: 0.35

## 2025-06-10 ASSESSMENT — EXTERNAL EXAM - RIGHT EYE: OD_EXAM: NORMAL

## 2025-06-10 ASSESSMENT — SLIT LAMP EXAM - LIDS
COMMENTS: NORMAL
COMMENTS: NORMAL

## 2025-06-10 NOTE — PROGRESS NOTES
CC -   Dry AMD    INTERVAL HISTORY - Initial visit with me, referred by Mely for retinal hole OS    PMH -   Ruthann Beck is a 65 year old patient with history of dry AMD OU.  Peripheral hole OS noted 4/2025 referred by Mely to Retina      PAST OCULAR SURGERY  None    RETINAL IMAGING:  OCT 06/10/25   OD - mild RPE elevations, ?PVD  OS - mild RPE elevations, ?PVD   peripehry - ?operculated hole/partly operc hole, no fluid      ASSESSMENT & PLAN    # Dry AMD OU   - early, could consider AREDS vs diet       # PVD OU   - likely based on OCT   - advised s/sx rd 6/2025      # retinal hole OS   - temporal operculated/partly operculated   - has pigmentation, appears chronic   - asymptomatic, incidental finding   - monitor      # Mild CDR asymmetry OS > OD   - still small, IOP good   - monitor with Mely    # NS OU   - early      Return 1 year with Mely.       ATTESTATION     Attending Attestation:     Complete documentation of historical and exam elements from today's encounter can be found in the full encounter summary report (not reduplicated in this progress note).  I personally obtained the chief complaint(s) and history of present illness.  I confirmed and edited as necessary the review of systems, past medical/surgical history, family history, social history, and examination findings as documented by others; and I examined the patient myself.  I personally reviewed the relevant tests, images, and reports as documented above.  I formulated and edited as necessary the assessment and plan and discussed the findings and management plan with the patient and family    Ambika Caballero MD, PhD  , Vitreoretinal Surgery  Department of Ophthalmology  [facility]

## 2025-06-10 NOTE — NURSING NOTE
Chief Complaints and History of Present Illnesses   Patient presents with    New Patient     New patient for retina evaluation and Peripheral retinal hole, Left eye     Chief Complaint(s) and History of Present Illness(es)       New Patient              Laterality: both eyes    Associated symptoms: Negative for flashes and floaters    Treatments tried: no treatments    Pain scale: 0/10    Comments: New patient for retina evaluation and Peripheral retinal hole, Left eye              Comments    The patient reports stable vision.    Jewell Garibay, COA, COA 7:53 AM 06/10/2025

## 2025-08-14 ENCOUNTER — PATIENT OUTREACH (OUTPATIENT)
Dept: CARE COORDINATION | Facility: CLINIC | Age: 65
End: 2025-08-14
Payer: COMMERCIAL

## 2025-08-18 ENCOUNTER — ANESTHESIA EVENT (OUTPATIENT)
Dept: SURGERY | Facility: AMBULATORY SURGERY CENTER | Age: 65
End: 2025-08-18
Payer: COMMERCIAL

## 2025-08-19 ENCOUNTER — ANESTHESIA (OUTPATIENT)
Dept: SURGERY | Facility: AMBULATORY SURGERY CENTER | Age: 65
End: 2025-08-19
Payer: COMMERCIAL

## 2025-08-19 ENCOUNTER — HOSPITAL ENCOUNTER (OUTPATIENT)
Facility: AMBULATORY SURGERY CENTER | Age: 65
Discharge: HOME | End: 2025-08-19
Attending: INTERNAL MEDICINE
Payer: COMMERCIAL

## 2025-08-19 VITALS
HEART RATE: 66 BPM | HEIGHT: 65 IN | WEIGHT: 160 LBS | TEMPERATURE: 97 F | DIASTOLIC BLOOD PRESSURE: 74 MMHG | BODY MASS INDEX: 26.66 KG/M2 | OXYGEN SATURATION: 98 % | SYSTOLIC BLOOD PRESSURE: 110 MMHG | RESPIRATION RATE: 14 BRPM

## 2025-08-19 VITALS — HEART RATE: 78 BPM

## 2025-08-19 LAB — COLONOSCOPY: NORMAL

## 2025-08-19 RX ORDER — NALOXONE HYDROCHLORIDE 0.4 MG/ML
0.2 INJECTION, SOLUTION INTRAMUSCULAR; INTRAVENOUS; SUBCUTANEOUS
Status: DISCONTINUED | OUTPATIENT
Start: 2025-08-19 | End: 2025-08-20 | Stop reason: HOSPADM

## 2025-08-19 RX ORDER — ONDANSETRON 2 MG/ML
4 INJECTION INTRAMUSCULAR; INTRAVENOUS
Status: DISCONTINUED | OUTPATIENT
Start: 2025-08-19 | End: 2025-08-19 | Stop reason: HOSPADM

## 2025-08-19 RX ORDER — NALOXONE HYDROCHLORIDE 0.4 MG/ML
0.4 INJECTION, SOLUTION INTRAMUSCULAR; INTRAVENOUS; SUBCUTANEOUS
Status: DISCONTINUED | OUTPATIENT
Start: 2025-08-19 | End: 2025-08-20 | Stop reason: HOSPADM

## 2025-08-19 RX ORDER — PROPOFOL 10 MG/ML
INJECTION, EMULSION INTRAVENOUS PRN
Status: DISCONTINUED | OUTPATIENT
Start: 2025-08-19 | End: 2025-08-19

## 2025-08-19 RX ORDER — LIDOCAINE 40 MG/G
CREAM TOPICAL
Status: DISCONTINUED | OUTPATIENT
Start: 2025-08-19 | End: 2025-08-19 | Stop reason: HOSPADM

## 2025-08-19 RX ORDER — PROPOFOL 10 MG/ML
INJECTION, EMULSION INTRAVENOUS CONTINUOUS PRN
Status: DISCONTINUED | OUTPATIENT
Start: 2025-08-19 | End: 2025-08-19

## 2025-08-19 RX ORDER — FLUMAZENIL 0.1 MG/ML
0.2 INJECTION, SOLUTION INTRAVENOUS
Status: DISCONTINUED | OUTPATIENT
Start: 2025-08-19 | End: 2025-08-20 | Stop reason: HOSPADM

## 2025-08-19 RX ORDER — ONDANSETRON 2 MG/ML
4 INJECTION INTRAMUSCULAR; INTRAVENOUS EVERY 6 HOURS PRN
Status: DISCONTINUED | OUTPATIENT
Start: 2025-08-19 | End: 2025-08-20 | Stop reason: HOSPADM

## 2025-08-19 RX ORDER — ONDANSETRON 4 MG/1
4 TABLET, ORALLY DISINTEGRATING ORAL EVERY 6 HOURS PRN
Status: DISCONTINUED | OUTPATIENT
Start: 2025-08-19 | End: 2025-08-20 | Stop reason: HOSPADM

## 2025-08-19 RX ORDER — LIDOCAINE HYDROCHLORIDE 20 MG/ML
INJECTION, SOLUTION INFILTRATION; PERINEURAL PRN
Status: DISCONTINUED | OUTPATIENT
Start: 2025-08-19 | End: 2025-08-19

## 2025-08-19 RX ORDER — PROCHLORPERAZINE MALEATE 10 MG
10 TABLET ORAL EVERY 6 HOURS PRN
Status: DISCONTINUED | OUTPATIENT
Start: 2025-08-19 | End: 2025-08-20 | Stop reason: HOSPADM

## 2025-08-19 RX ORDER — SODIUM CHLORIDE, SODIUM LACTATE, POTASSIUM CHLORIDE, CALCIUM CHLORIDE 600; 310; 30; 20 MG/100ML; MG/100ML; MG/100ML; MG/100ML
INJECTION, SOLUTION INTRAVENOUS CONTINUOUS PRN
Status: DISCONTINUED | OUTPATIENT
Start: 2025-08-19 | End: 2025-08-19

## 2025-08-19 RX ADMIN — SODIUM CHLORIDE, SODIUM LACTATE, POTASSIUM CHLORIDE, CALCIUM CHLORIDE: 600; 310; 30; 20 INJECTION, SOLUTION INTRAVENOUS at 13:08

## 2025-08-19 RX ADMIN — PROPOFOL 70 MG: 10 INJECTION, EMULSION INTRAVENOUS at 13:12

## 2025-08-19 RX ADMIN — LIDOCAINE HYDROCHLORIDE 60 MG: 20 INJECTION, SOLUTION INFILTRATION; PERINEURAL at 13:12

## 2025-08-19 RX ADMIN — PROPOFOL 200 MCG/KG/MIN: 10 INJECTION, EMULSION INTRAVENOUS at 13:12

## 2025-08-19 ASSESSMENT — COPD QUESTIONNAIRES: COPD: 0

## 2025-08-19 ASSESSMENT — LIFESTYLE VARIABLES: TOBACCO_USE: 0

## 2025-08-19 ASSESSMENT — ENCOUNTER SYMPTOMS: ORTHOPNEA: 0

## 2025-08-28 ENCOUNTER — PATIENT OUTREACH (OUTPATIENT)
Dept: CARE COORDINATION | Facility: CLINIC | Age: 65
End: 2025-08-28
Payer: COMMERCIAL

## (undated) DEVICE — TUBING SUCTION MEDI-VAC 1/4"X20' N620A

## (undated) DEVICE — PAD CHUX UNDERPAD 30X36" P3036C

## (undated) DEVICE — GOWN IMPERVIOUS 2XL BLUE

## (undated) DEVICE — GOWN ISOLATION YELLOW UNIV NOT STERILE 3110PG

## (undated) DEVICE — KIT ENDO TURNOVER/PROCEDURE CARRY-ON 101822

## (undated) DEVICE — CAPTIVATOR COLD THIN WIRE

## (undated) DEVICE — TUBING SUCTION 12"X1/4" N612

## (undated) DEVICE — SOL WATER IRRIG 1000ML BOTTLE 2F7114

## (undated) DEVICE — ENDO TRAP POLYP E-TRAP 00711099

## (undated) DEVICE — SOL WATER IRRIG 500ML BOTTLE 2F7113

## (undated) DEVICE — SUCTION MANIFOLD NEPTUNE 2 SYS 1 PORT 702-025-000

## (undated) DEVICE — SPECIMEN CONTAINER 3OZ W/FORMALIN 59901

## (undated) RX ORDER — FENTANYL CITRATE 50 UG/ML
INJECTION, SOLUTION INTRAMUSCULAR; INTRAVENOUS
Status: DISPENSED
Start: 2020-07-14